# Patient Record
Sex: MALE | Race: BLACK OR AFRICAN AMERICAN | Employment: FULL TIME | URBAN - METROPOLITAN AREA
[De-identification: names, ages, dates, MRNs, and addresses within clinical notes are randomized per-mention and may not be internally consistent; named-entity substitution may affect disease eponyms.]

---

## 2018-11-12 ENCOUNTER — HOSPITAL ENCOUNTER (EMERGENCY)
Age: 38
Discharge: HOME | End: 2018-11-12
Attending: EMERGENCY MEDICINE

## 2018-11-12 VITALS
HEIGHT: 67 IN | WEIGHT: 225 LBS | SYSTOLIC BLOOD PRESSURE: 130 MMHG | DIASTOLIC BLOOD PRESSURE: 88 MMHG | RESPIRATION RATE: 18 BRPM | BODY MASS INDEX: 35.31 KG/M2 | TEMPERATURE: 97.9 F | OXYGEN SATURATION: 100 % | HEART RATE: 75 BPM

## 2018-11-12 DIAGNOSIS — S39.012A STRAIN OF LUMBAR REGION, INITIAL ENCOUNTER: Primary | ICD-10-CM

## 2018-11-12 PROCEDURE — G0382 LEV 3 HOSP TYPE B ED VISIT: HCPCS

## 2018-11-12 RX ORDER — CYCLOBENZAPRINE HCL 10 MG
10 TABLET ORAL 3 TIMES DAILY PRN
Qty: 20 TABLET | Refills: 0 | Status: SHIPPED | OUTPATIENT
Start: 2018-11-12 | End: 2018-11-19

## 2018-11-12 RX ORDER — NAPROXEN 500 MG/1
500 TABLET ORAL 2 TIMES DAILY
Qty: 60 TABLET | Refills: 0 | Status: SHIPPED | OUTPATIENT
Start: 2018-11-12

## 2018-11-12 ASSESSMENT — PAIN DESCRIPTION - ORIENTATION: ORIENTATION: LEFT;RIGHT;MID;LOWER

## 2018-11-12 ASSESSMENT — PAIN DESCRIPTION - ONSET: ONSET: ON-GOING

## 2018-11-12 ASSESSMENT — PAIN DESCRIPTION - PAIN TYPE: TYPE: ACUTE PAIN;CHRONIC PAIN

## 2018-11-12 ASSESSMENT — PAIN DESCRIPTION - PROGRESSION: CLINICAL_PROGRESSION: GRADUALLY WORSENING

## 2018-11-12 ASSESSMENT — PAIN SCALES - GENERAL: PAINLEVEL_OUTOF10: 9

## 2018-11-12 ASSESSMENT — PAIN DESCRIPTION - FREQUENCY: FREQUENCY: CONTINUOUS

## 2018-11-12 ASSESSMENT — PAIN DESCRIPTION - LOCATION: LOCATION: BACK

## 2018-11-12 NOTE — ED PROVIDER NOTES
[facility]  ED  eMERGENCY dEPARTMENT eNCOUnter      Pt Name: Flakito Chi  MRN: [id number]  [place] 1980  Date of evaluation: 11/12/18      CHIEF COMPLAINT:   Chief Complaint   Patient presents with    Back Pain     HISTORY OF PRESENT ILLNESS    Flakito Chi is a 41 y.o. male who presents with Low lumbar pain ever sense work injury one year ago. Pain is sharp in nature and worse with movement made better by recumbency and immobilization. Denies Weakness or numbness/tingling. No loss of Bowel/Bladder function. Denies CP/SOB/n/v/f/c/abd pain. Denies IVDA. REVIEW OF SYSTEMS     Constitutional: Denies recent fever, chills. Eyes: No visual changes. Neck: No neck pain. Respiratory: Denies recent shortness of breath. Cardiac:  Denies recent chest pain. GI: denies any recent abdominal pain nausea or vomiting. Denies Blood in the stool or black tarry stools. : denies dysuria. Musculoskeletal: Denies focal weakness. Neurologic: denies headache or focal weakness. Skin:  Denies any rash. Negative in 10 essential Systems except as mentioned above and in the HPI. PAST MEDICAL HISTORY   PMH:  has no past medical history on file. Surgical History:  has no past surgical history on file. Social History:    Family History: Noncontributory at this time  Psychiatric History: Noncontributory at this time    Allergies:has No Known Allergies. PHYSICAL EXAM     INITIAL VITALS: BP: 130/88  Pulse: 75  Resp: 18  Temp: 97.9 °F (36.6 °C) SpO2: 100 %    CONSTITUTIONAL: Vital signs reviewed, Alert and oriented X 3. HEAD: Atraumatic, Normocephalic. EYES: Eyes are normal to inspection, Pupils equal, round and reactive to light. NECK: Normal ROM, No jugular venous distention, No meningeal signs, Cervical spine nontender. RESPIRATORY CHEST: No respiratory distress. ABDOMEN: Abdomen is nontender, No distension. No pulsatile masses palpated.     BACK:

## 2021-01-27 ENCOUNTER — APPOINTMENT (OUTPATIENT)
Dept: GENERAL RADIOLOGY | Age: 41
End: 2021-01-27
Payer: MEDICARE

## 2021-01-27 ENCOUNTER — HOSPITAL ENCOUNTER (EMERGENCY)
Age: 41
Discharge: HOME | End: 2021-01-27
Attending: EMERGENCY MEDICINE
Payer: MEDICARE

## 2021-01-27 VITALS
HEIGHT: 67 IN | OXYGEN SATURATION: 98 % | TEMPERATURE: 98.3 F | WEIGHT: 220 LBS | SYSTOLIC BLOOD PRESSURE: 151 MMHG | DIASTOLIC BLOOD PRESSURE: 76 MMHG | HEART RATE: 78 BPM | BODY MASS INDEX: 34.53 KG/M2 | RESPIRATION RATE: 18 BRPM

## 2021-01-27 DIAGNOSIS — S46.919A STRAIN OF SHOULDER, UNSPECIFIED LATERALITY, INITIAL ENCOUNTER: ICD-10-CM
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
## 2021-01-27 DIAGNOSIS — S39.012A STRAIN OF LUMBAR REGION, INITIAL ENCOUNTER: ICD-10-CM

## 2021-01-27 PROCEDURE — 72040 X-RAY EXAM NECK SPINE 2-3 VW: CPT

## 2021-01-27 PROCEDURE — 72100 X-RAY EXAM L-S SPINE 2/3 VWS: CPT

## 2021-01-27 PROCEDURE — 99283 EMERGENCY DEPT VISIT LOW MDM: CPT

## 2021-01-27 PROCEDURE — 72072 X-RAY EXAM THORAC SPINE 3VWS: CPT

## 2021-01-27 PROCEDURE — 73030 X-RAY EXAM OF SHOULDER: CPT

## 2021-01-27 RX ORDER — CYCLOBENZAPRINE HCL 10 MG
10 TABLET ORAL 3 TIMES DAILY PRN
Qty: 21 TABLET | Refills: 0 | Status: SHIPPED | OUTPATIENT
Start: 2021-01-27 | End: 2021-02-06

## 2021-01-27 RX ORDER — IBUPROFEN 800 MG/1
800 TABLET ORAL ONCE
Status: DISCONTINUED | OUTPATIENT
Start: 2021-01-27 | End: 2021-01-27

## 2021-01-27 RX ORDER — IBUPROFEN 800 MG/1
800 TABLET ORAL EVERY 8 HOURS PRN
Qty: 30 TABLET | Refills: 0 | Status: SHIPPED | OUTPATIENT
Start: 2021-01-27

## 2021-01-27 ASSESSMENT — ENCOUNTER SYMPTOMS
EYE REDNESS: 0
VOMITING: 0
BACK PAIN: 1
SHORTNESS OF BREATH: 0
COLOR CHANGE: 0
NAUSEA: 0
DIARRHEA: 0
SORE THROAT: 0
COUGH: 0
RHINORRHEA: 0
EYE DISCHARGE: 0

## 2021-01-27 ASSESSMENT — PAIN SCALES - GENERAL: PAINLEVEL_OUTOF10: 4
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
## 2024-02-07 ENCOUNTER — HOSPITAL ENCOUNTER (EMERGENCY)
Age: 44
Discharge: HOME | End: 2024-02-07
Attending: EMERGENCY MEDICINE

## 2024-02-07 VITALS
OXYGEN SATURATION: 100 % | DIASTOLIC BLOOD PRESSURE: 71 MMHG | WEIGHT: 289 LBS | HEIGHT: 67 IN | RESPIRATION RATE: 17 BRPM | BODY MASS INDEX: 45.36 KG/M2 | TEMPERATURE: 100.6 F | HEART RATE: 103 BPM | SYSTOLIC BLOOD PRESSURE: 113 MMHG

## 2024-02-07 DIAGNOSIS — B34.9 VIRAL SYNDROME: Primary | ICD-10-CM

## 2024-02-07 DIAGNOSIS — M25.552 LEFT HIP PAIN: ICD-10-CM

## 2024-02-07 PROCEDURE — 99283 EMERGENCY DEPT VISIT LOW MDM: CPT

## 2024-02-07 PROCEDURE — 6370000000 HC RX 637 (ALT 250 FOR IP): Performed by: EMERGENCY MEDICINE

## 2024-02-07 RX ORDER — IBUPROFEN 800 MG/1
800 TABLET ORAL ONCE
Status: COMPLETED | OUTPATIENT
Start: 2024-02-07 | End: 2024-02-07

## 2024-02-07 RX ADMIN — IBUPROFEN 800 MG: 800 TABLET ORAL at 17:02

## 2024-02-07 ASSESSMENT — PAIN SCALES - GENERAL: PAINLEVEL_OUTOF10: 6

## 2024-02-07 NOTE — DISCHARGE INSTRUCTIONS
Use Tylenol and ibuprofen as needed for fevers and pain.  If you have any concerning symptoms come back to the ER.  Follow-up with your primary care doctor.

## 2024-02-08 NOTE — ED PROVIDER NOTES
EMERGENCY DEPARTMENT ENCOUNTER    Pt Name: Yuri Phillips  MRN: [id number]  Birthdate 1980  Date of evaluation: 2/7/24  CHIEF COMPLAINT       Chief Complaint   Patient presents with    Hip Pain     Px complains of L hip pain that flared up this am. Px denies injury    Illness     Px complains of symptoms of illness including migraine, cough, sore throat, and fever that started 2/6.      HISTORY OF PRESENT ILLNESS   HPI  43-year-old male with no significant past medical history presents to the ED for 2 days of headache, cough, sore throat, intermittent fevers, left hip pain.  Patient states that for the past few days he has had URI symptoms, today he woke up and had some left hip soreness, states that this is not new, thinks he slept on his hip in a weird position.  Patient denies any trauma, chest pain, shortness of breath, nausea/vomiting or any other acute complaints.    REVIEW OF SYSTEMS     Review of Systems   All other systems reviewed and are negative.    PASTMEDICAL HISTORY   No past medical history on file.  SURGICAL HISTORY     No past surgical history on file.  CURRENT MEDICATIONS       Discharge Medication List as of 2/7/2024  5:00 PM        CONTINUE these medications which have NOT CHANGED    Details   ibuprofen (ADVIL;MOTRIN) 800 MG tablet Take 1 tablet by mouth every 8 hours as needed for Pain, Disp-30 tablet, R-0Print      ibuprofen (CHILDRENS ADVIL) 100 MG/5ML suspension Take 40 mLs by mouth every 8 hours as needed for Fever, Disp-1 Bottle, R-3Print      naproxen (NAPROSYN) 500 MG tablet Take 1 tablet by mouth 2 times daily, Disp-60 tablet, R-0Print           ALLERGIES     has No Known Allergies.  FAMILY HISTORY     has no family status information on file.      SOCIAL HISTORY       Social History     Tobacco Use    Smoking status: Never   Substance Use Topics    Alcohol use: Not Currently    Drug use: Never     PHYSICAL EXAM     INITIAL VITALS: /71   Pulse (!) 103   Temp (!) 100.6 °F (38.1

## 2024-08-07 ENCOUNTER — HOSPITAL ENCOUNTER (INPATIENT)
Age: 44
LOS: 2 days | Discharge: HOME | DRG: 392 | End: 2024-08-09
Attending: EMERGENCY MEDICINE | Admitting: FAMILY MEDICINE
Payer: COMMERCIAL

## 2024-08-07 ENCOUNTER — APPOINTMENT (OUTPATIENT)
Dept: CT IMAGING | Age: 44
DRG: 392 | End: 2024-08-07
Payer: COMMERCIAL

## 2024-08-07 DIAGNOSIS — K61.1 PERIRECTAL ABSCESS: Primary | ICD-10-CM

## 2024-08-07 DIAGNOSIS — K52.9 COLITIS: ICD-10-CM

## 2024-08-07 PROBLEM — N30.00 ACUTE CYSTITIS WITHOUT HEMATURIA: Status: ACTIVE | Noted: 2024-08-07

## 2024-08-07 PROBLEM — R11.2 NAUSEA VOMITING AND DIARRHEA: Status: ACTIVE | Noted: 2024-08-07

## 2024-08-07 PROBLEM — K63.0 COLITIS WITH ABSCESS: Status: ACTIVE | Noted: 2024-08-07

## 2024-08-07 PROBLEM — R10.9 INTRACTABLE ABDOMINAL PAIN: Status: ACTIVE | Noted: 2024-08-07

## 2024-08-07 PROBLEM — R19.7 NAUSEA VOMITING AND DIARRHEA: Status: ACTIVE | Noted: 2024-08-07

## 2024-08-07 PROBLEM — K62.89 PROCTITIS: Status: ACTIVE | Noted: 2024-08-07

## 2024-08-07 LAB
ALBUMIN SERPL-MCNC: 4 G/DL (ref 3.5–5.2)
ALP SERPL-CCNC: 84 U/L (ref 40–129)
ALT SERPL-CCNC: 7 U/L (ref 5–41)
ANION GAP SERPL CALCULATED.3IONS-SCNC: 13 MMOL/L (ref 9–17)
AST SERPL-CCNC: 11 U/L
BACTERIA URNS QL MICRO: ABNORMAL
BASOPHILS # BLD: <0.03 K/UL (ref 0–0.2)
BASOPHILS NFR BLD: 0 % (ref 0–2)
BILIRUB SERPL-MCNC: 0.5 MG/DL (ref 0.3–1.2)
BILIRUB UR QL STRIP: ABNORMAL
BUN SERPL-MCNC: 10 MG/DL (ref 6–20)
BUN/CREAT SERPL: 10 (ref 9–20)
CALCIUM SERPL-MCNC: 9 MG/DL (ref 8.6–10.4)
CHLORIDE SERPL-SCNC: 99 MMOL/L (ref 98–107)
CLARITY UR: CLEAR
CO2 SERPL-SCNC: 26 MMOL/L (ref 20–31)
COLOR UR: ABNORMAL
CREAT SERPL-MCNC: 1 MG/DL (ref 0.7–1.2)
EOSINOPHIL # BLD: 0.04 K/UL (ref 0–0.44)
EOSINOPHILS RELATIVE PERCENT: 0 % (ref 1–4)
EPI CELLS #/AREA URNS HPF: ABNORMAL /HPF (ref 0–5)
ERYTHROCYTE [DISTWIDTH] IN BLOOD BY AUTOMATED COUNT: 15.4 % (ref 11.8–14.4)
GFR, ESTIMATED: >90 ML/MIN/1.73M2
GLUCOSE BLD-MCNC: 90 MG/DL (ref 75–110)
GLUCOSE SERPL-MCNC: 100 MG/DL (ref 70–99)
GLUCOSE UR STRIP-MCNC: NEGATIVE MG/DL
HCT VFR BLD AUTO: 38.9 % (ref 40.7–50.3)
HGB BLD-MCNC: 12.7 G/DL (ref 13–17)
HGB UR QL STRIP.AUTO: NEGATIVE
IMM GRANULOCYTES # BLD AUTO: 0.02 K/UL (ref 0–0.3)
IMM GRANULOCYTES NFR BLD: 0 %
KETONES UR STRIP-MCNC: ABNORMAL MG/DL
LEUKOCYTE ESTERASE UR QL STRIP: NEGATIVE
LIPASE SERPL-CCNC: 28 U/L (ref 13–60)
LYMPHOCYTES NFR BLD: 2.14 K/UL (ref 1.1–3.7)
LYMPHOCYTES RELATIVE PERCENT: 21 % (ref 24–43)
MCH RBC QN AUTO: 28.1 PG (ref 25.2–33.5)
MCHC RBC AUTO-ENTMCNC: 32.6 G/DL (ref 28.4–34.8)
MCV RBC AUTO: 86.1 FL (ref 82.6–102.9)
MONOCYTES NFR BLD: 0.78 K/UL (ref 0.1–1.2)
MONOCYTES NFR BLD: 8 % (ref 3–12)
MUCOUS THREADS URNS QL MICRO: ABNORMAL
NEUTROPHILS NFR BLD: 71 % (ref 36–65)
NEUTS SEG NFR BLD: 7 K/UL (ref 1.5–8.1)
NITRITE UR QL STRIP: POSITIVE
NRBC BLD-RTO: 0 PER 100 WBC
PH UR STRIP: 6 [PH] (ref 5–8)
PLATELET # BLD AUTO: 397 K/UL (ref 138–453)
PMV BLD AUTO: 8.9 FL (ref 8.1–13.5)
POTASSIUM SERPL-SCNC: 3.5 MMOL/L (ref 3.7–5.3)
PROT SERPL-MCNC: 7.5 G/DL (ref 6.4–8.3)
PROT UR STRIP-MCNC: ABNORMAL MG/DL
RBC # BLD AUTO: 4.52 M/UL (ref 4.21–5.77)
RBC # BLD: ABNORMAL 10*6/UL
RBC #/AREA URNS HPF: ABNORMAL /HPF (ref 0–2)
SODIUM SERPL-SCNC: 138 MMOL/L (ref 135–144)
SP GR UR STRIP: 1.06 (ref 1–1.03)
UROBILINOGEN UR STRIP-ACNC: ABNORMAL EU/DL (ref 0–1)
WBC #/AREA URNS HPF: ABNORMAL /HPF (ref 0–5)
WBC OTHER # BLD: 10 K/UL (ref 3.5–11.3)

## 2024-08-07 PROCEDURE — 96374 THER/PROPH/DIAG INJ IV PUSH: CPT

## 2024-08-07 PROCEDURE — 99285 EMERGENCY DEPT VISIT HI MDM: CPT

## 2024-08-07 PROCEDURE — 74177 CT ABD & PELVIS W/CONTRAST: CPT

## 2024-08-07 PROCEDURE — 87086 URINE CULTURE/COLONY COUNT: CPT

## 2024-08-07 PROCEDURE — 81001 URINALYSIS AUTO W/SCOPE: CPT

## 2024-08-07 PROCEDURE — 85025 COMPLETE CBC W/AUTO DIFF WBC: CPT

## 2024-08-07 PROCEDURE — 2580000003 HC RX 258: Performed by: EMERGENCY MEDICINE

## 2024-08-07 PROCEDURE — 6360000004 HC RX CONTRAST MEDICATION: Performed by: EMERGENCY MEDICINE

## 2024-08-07 PROCEDURE — 80053 COMPREHEN METABOLIC PANEL: CPT

## 2024-08-07 PROCEDURE — 93005 ELECTROCARDIOGRAM TRACING: CPT

## 2024-08-07 PROCEDURE — 1200000000 HC SEMI PRIVATE

## 2024-08-07 PROCEDURE — 6360000002 HC RX W HCPCS: Performed by: EMERGENCY MEDICINE

## 2024-08-07 PROCEDURE — 99222 1ST HOSP IP/OBS MODERATE 55: CPT

## 2024-08-07 PROCEDURE — 83690 ASSAY OF LIPASE: CPT

## 2024-08-07 PROCEDURE — 82947 ASSAY GLUCOSE BLOOD QUANT: CPT

## 2024-08-07 RX ORDER — METRONIDAZOLE 500 MG/100ML
500 INJECTION, SOLUTION INTRAVENOUS ONCE
Status: COMPLETED | OUTPATIENT
Start: 2024-08-07 | End: 2024-08-07

## 2024-08-07 RX ORDER — SODIUM CHLORIDE, SODIUM LACTATE, POTASSIUM CHLORIDE, CALCIUM CHLORIDE 600; 310; 30; 20 MG/100ML; MG/100ML; MG/100ML; MG/100ML
INJECTION, SOLUTION INTRAVENOUS CONTINUOUS
Status: DISCONTINUED | OUTPATIENT
Start: 2024-08-07 | End: 2024-08-08

## 2024-08-07 RX ORDER — LEVOFLOXACIN 5 MG/ML
250 INJECTION, SOLUTION INTRAVENOUS ONCE
Status: COMPLETED | OUTPATIENT
Start: 2024-08-07 | End: 2024-08-08

## 2024-08-07 RX ORDER — LEVOFLOXACIN 5 MG/ML
500 INJECTION, SOLUTION INTRAVENOUS ONCE
Status: COMPLETED | OUTPATIENT
Start: 2024-08-07 | End: 2024-08-08

## 2024-08-07 RX ORDER — LEVOFLOXACIN 5 MG/ML
750 INJECTION, SOLUTION INTRAVENOUS ONCE
Status: DISCONTINUED | OUTPATIENT
Start: 2024-08-07 | End: 2024-08-07

## 2024-08-07 RX ORDER — SODIUM CHLORIDE 0.9 % (FLUSH) 0.9 %
10 SYRINGE (ML) INJECTION PRN
Status: DISCONTINUED | OUTPATIENT
Start: 2024-08-07 | End: 2024-08-09 | Stop reason: HOSPADM

## 2024-08-07 RX ORDER — 0.9 % SODIUM CHLORIDE 0.9 %
80 INTRAVENOUS SOLUTION INTRAVENOUS ONCE
Status: COMPLETED | OUTPATIENT
Start: 2024-08-07 | End: 2024-08-07

## 2024-08-07 RX ADMIN — SODIUM CHLORIDE, PRESERVATIVE FREE 10 ML: 5 INJECTION INTRAVENOUS at 20:38

## 2024-08-07 RX ADMIN — LEVOFLOXACIN 500 MG: 5 INJECTION, SOLUTION INTRAVENOUS at 23:22

## 2024-08-07 RX ADMIN — SODIUM CHLORIDE 80 ML: 9 INJECTION, SOLUTION INTRAVENOUS at 20:38

## 2024-08-07 RX ADMIN — METRONIDAZOLE 500 MG: 500 INJECTION, SOLUTION INTRAVENOUS at 22:21

## 2024-08-07 RX ADMIN — SODIUM CHLORIDE, POTASSIUM CHLORIDE, SODIUM LACTATE AND CALCIUM CHLORIDE: 600; 310; 30; 20 INJECTION, SOLUTION INTRAVENOUS at 22:17

## 2024-08-07 RX ADMIN — IOPAMIDOL 75 ML: 755 INJECTION, SOLUTION INTRAVENOUS at 20:37

## 2024-08-07 ASSESSMENT — PAIN - FUNCTIONAL ASSESSMENT: PAIN_FUNCTIONAL_ASSESSMENT: 0-10

## 2024-08-07 ASSESSMENT — ENCOUNTER SYMPTOMS
SORE THROAT: 0
ABDOMINAL DISTENTION: 1
CONSTIPATION: 0
WHEEZING: 0
NAUSEA: 1
ABDOMINAL PAIN: 1
VOMITING: 1
EYE REDNESS: 0
SHORTNESS OF BREATH: 0
DIARRHEA: 1

## 2024-08-07 ASSESSMENT — PAIN SCALES - GENERAL: PAINLEVEL_OUTOF10: 2

## 2024-08-08 LAB
ANION GAP SERPL CALCULATED.3IONS-SCNC: 10 MMOL/L (ref 9–17)
BASOPHILS # BLD: <0.03 K/UL (ref 0–0.2)
BASOPHILS NFR BLD: 0 % (ref 0–2)
BUN SERPL-MCNC: 8 MG/DL (ref 6–20)
BUN/CREAT SERPL: 8 (ref 9–20)
CALCIUM SERPL-MCNC: 8.5 MG/DL (ref 8.6–10.4)
CHLORIDE SERPL-SCNC: 105 MMOL/L (ref 98–107)
CO2 SERPL-SCNC: 25 MMOL/L (ref 20–31)
CREAT SERPL-MCNC: 1 MG/DL (ref 0.7–1.2)
EKG ATRIAL RATE: 51 BPM
EKG P AXIS: 47 DEGREES
EKG P-R INTERVAL: 138 MS
EKG Q-T INTERVAL: 426 MS
EKG QRS DURATION: 86 MS
EKG QTC CALCULATION (BAZETT): 392 MS
EKG R AXIS: 9 DEGREES
EKG T AXIS: 15 DEGREES
EKG VENTRICULAR RATE: 51 BPM
EOSINOPHIL # BLD: 0.12 K/UL (ref 0–0.44)
EOSINOPHILS RELATIVE PERCENT: 2 % (ref 1–4)
ERYTHROCYTE [DISTWIDTH] IN BLOOD BY AUTOMATED COUNT: 15.1 % (ref 11.8–14.4)
GFR, ESTIMATED: >90 ML/MIN/1.73M2
GLUCOSE SERPL-MCNC: 99 MG/DL (ref 70–99)
HCT VFR BLD AUTO: 36.2 % (ref 40.7–50.3)
HGB BLD-MCNC: 12.1 G/DL (ref 13–17)
IMM GRANULOCYTES # BLD AUTO: 0.01 K/UL (ref 0–0.3)
IMM GRANULOCYTES NFR BLD: 0 %
INR PPP: 1.1
LYMPHOCYTES NFR BLD: 2.25 K/UL (ref 1.1–3.7)
LYMPHOCYTES RELATIVE PERCENT: 30 % (ref 24–43)
MCH RBC QN AUTO: 28.4 PG (ref 25.2–33.5)
MCHC RBC AUTO-ENTMCNC: 33.4 G/DL (ref 28.4–34.8)
MCV RBC AUTO: 85 FL (ref 82.6–102.9)
MICROORGANISM SPEC CULT: NO GROWTH
MONOCYTES NFR BLD: 0.75 K/UL (ref 0.1–1.2)
MONOCYTES NFR BLD: 10 % (ref 3–12)
NEUTROPHILS NFR BLD: 58 % (ref 36–65)
NEUTS SEG NFR BLD: 4.36 K/UL (ref 1.5–8.1)
NRBC BLD-RTO: 0 PER 100 WBC
PLATELET # BLD AUTO: 345 K/UL (ref 138–453)
PMV BLD AUTO: 8.4 FL (ref 8.1–13.5)
POTASSIUM SERPL-SCNC: 3.7 MMOL/L (ref 3.7–5.3)
PROTHROMBIN TIME: 14.6 SEC (ref 11.5–14.2)
RBC # BLD AUTO: 4.26 M/UL (ref 4.21–5.77)
RBC # BLD: ABNORMAL 10*6/UL
SERVICE CMNT-IMP: NORMAL
SODIUM SERPL-SCNC: 140 MMOL/L (ref 135–144)
SPECIMEN DESCRIPTION: NORMAL
WBC OTHER # BLD: 7.5 K/UL (ref 3.5–11.3)

## 2024-08-08 PROCEDURE — 87506 IADNA-DNA/RNA PROBE TQ 6-11: CPT

## 2024-08-08 PROCEDURE — 2580000003 HC RX 258

## 2024-08-08 PROCEDURE — 1200000000 HC SEMI PRIVATE

## 2024-08-08 PROCEDURE — 87449 NOS EACH ORGANISM AG IA: CPT

## 2024-08-08 PROCEDURE — 99232 SBSQ HOSP IP/OBS MODERATE 35: CPT | Performed by: FAMILY MEDICINE

## 2024-08-08 PROCEDURE — 85025 COMPLETE CBC W/AUTO DIFF WBC: CPT

## 2024-08-08 PROCEDURE — 80048 BASIC METABOLIC PNL TOTAL CA: CPT

## 2024-08-08 PROCEDURE — 6370000000 HC RX 637 (ALT 250 FOR IP): Performed by: NURSE PRACTITIONER

## 2024-08-08 PROCEDURE — 87324 CLOSTRIDIUM AG IA: CPT

## 2024-08-08 PROCEDURE — 85610 PROTHROMBIN TIME: CPT

## 2024-08-08 PROCEDURE — 36415 COLL VENOUS BLD VENIPUNCTURE: CPT

## 2024-08-08 PROCEDURE — 6360000002 HC RX W HCPCS

## 2024-08-08 PROCEDURE — 6360000002 HC RX W HCPCS: Performed by: EMERGENCY MEDICINE

## 2024-08-08 RX ORDER — LEVOFLOXACIN 5 MG/ML
750 INJECTION, SOLUTION INTRAVENOUS EVERY 24 HOURS
Status: DISCONTINUED | OUTPATIENT
Start: 2024-08-08 | End: 2024-08-09 | Stop reason: HOSPADM

## 2024-08-08 RX ORDER — METRONIDAZOLE 500 MG/100ML
500 INJECTION, SOLUTION INTRAVENOUS EVERY 8 HOURS
Status: DISCONTINUED | OUTPATIENT
Start: 2024-08-08 | End: 2024-08-09 | Stop reason: HOSPADM

## 2024-08-08 RX ORDER — POTASSIUM CHLORIDE 20 MEQ/1
40 TABLET, EXTENDED RELEASE ORAL PRN
Status: DISCONTINUED | OUTPATIENT
Start: 2024-08-08 | End: 2024-08-09 | Stop reason: HOSPADM

## 2024-08-08 RX ORDER — MORPHINE SULFATE 2 MG/ML
2 INJECTION, SOLUTION INTRAMUSCULAR; INTRAVENOUS EVERY 4 HOURS PRN
Status: DISCONTINUED | OUTPATIENT
Start: 2024-08-08 | End: 2024-08-09 | Stop reason: HOSPADM

## 2024-08-08 RX ORDER — ACETAMINOPHEN 650 MG/1
650 SUPPOSITORY RECTAL EVERY 6 HOURS PRN
Status: DISCONTINUED | OUTPATIENT
Start: 2024-08-08 | End: 2024-08-09 | Stop reason: HOSPADM

## 2024-08-08 RX ORDER — ACETAMINOPHEN 325 MG/1
650 TABLET ORAL EVERY 6 HOURS PRN
Status: DISCONTINUED | OUTPATIENT
Start: 2024-08-08 | End: 2024-08-09 | Stop reason: HOSPADM

## 2024-08-08 RX ORDER — ONDANSETRON 4 MG/1
4 TABLET, ORALLY DISINTEGRATING ORAL EVERY 8 HOURS PRN
Status: DISCONTINUED | OUTPATIENT
Start: 2024-08-08 | End: 2024-08-09 | Stop reason: HOSPADM

## 2024-08-08 RX ORDER — SODIUM CHLORIDE 0.9 % (FLUSH) 0.9 %
5-40 SYRINGE (ML) INJECTION EVERY 12 HOURS SCHEDULED
Status: DISCONTINUED | OUTPATIENT
Start: 2024-08-08 | End: 2024-08-09 | Stop reason: HOSPADM

## 2024-08-08 RX ORDER — SODIUM CHLORIDE 9 MG/ML
INJECTION, SOLUTION INTRAVENOUS PRN
Status: DISCONTINUED | OUTPATIENT
Start: 2024-08-08 | End: 2024-08-09 | Stop reason: HOSPADM

## 2024-08-08 RX ORDER — ENOXAPARIN SODIUM 100 MG/ML
40 INJECTION SUBCUTANEOUS DAILY
Status: DISCONTINUED | OUTPATIENT
Start: 2024-08-09 | End: 2024-08-09

## 2024-08-08 RX ORDER — POLYETHYLENE GLYCOL 3350 17 G/17G
17 POWDER, FOR SOLUTION ORAL DAILY PRN
Status: DISCONTINUED | OUTPATIENT
Start: 2024-08-08 | End: 2024-08-09 | Stop reason: HOSPADM

## 2024-08-08 RX ORDER — POTASSIUM CHLORIDE 7.45 MG/ML
10 INJECTION INTRAVENOUS PRN
Status: DISCONTINUED | OUTPATIENT
Start: 2024-08-08 | End: 2024-08-09 | Stop reason: HOSPADM

## 2024-08-08 RX ORDER — ENOXAPARIN SODIUM 100 MG/ML
30 INJECTION SUBCUTANEOUS 2 TIMES DAILY
Status: DISCONTINUED | OUTPATIENT
Start: 2024-08-09 | End: 2024-08-08

## 2024-08-08 RX ORDER — MAGNESIUM SULFATE 1 G/100ML
1000 INJECTION INTRAVENOUS PRN
Status: DISCONTINUED | OUTPATIENT
Start: 2024-08-08 | End: 2024-08-09 | Stop reason: HOSPADM

## 2024-08-08 RX ORDER — SODIUM CHLORIDE AND POTASSIUM CHLORIDE 300; 900 MG/100ML; MG/100ML
INJECTION, SOLUTION INTRAVENOUS CONTINUOUS
Status: DISCONTINUED | OUTPATIENT
Start: 2024-08-08 | End: 2024-08-09 | Stop reason: HOSPADM

## 2024-08-08 RX ORDER — SODIUM CHLORIDE 0.9 % (FLUSH) 0.9 %
10 SYRINGE (ML) INJECTION PRN
Status: DISCONTINUED | OUTPATIENT
Start: 2024-08-08 | End: 2024-08-09 | Stop reason: HOSPADM

## 2024-08-08 RX ORDER — ONDANSETRON 2 MG/ML
4 INJECTION INTRAMUSCULAR; INTRAVENOUS EVERY 6 HOURS PRN
Status: DISCONTINUED | OUTPATIENT
Start: 2024-08-08 | End: 2024-08-09 | Stop reason: HOSPADM

## 2024-08-08 RX ADMIN — METRONIDAZOLE 500 MG: 500 INJECTION, SOLUTION INTRAVENOUS at 21:45

## 2024-08-08 RX ADMIN — POLYETHYLENE GLYCOL-3350 AND ELECTROLYTES 4000 ML: 236; 6.74; 5.86; 2.97; 22.74 POWDER, FOR SOLUTION ORAL at 17:14

## 2024-08-08 RX ADMIN — METRONIDAZOLE 500 MG: 500 INJECTION, SOLUTION INTRAVENOUS at 05:20

## 2024-08-08 RX ADMIN — PANTOPRAZOLE SODIUM 40 MG: 40 INJECTION, POWDER, FOR SOLUTION INTRAVENOUS at 21:44

## 2024-08-08 RX ADMIN — LEVOFLOXACIN 250 MG: 5 INJECTION, SOLUTION INTRAVENOUS at 01:04

## 2024-08-08 RX ADMIN — METRONIDAZOLE 500 MG: 500 INJECTION, SOLUTION INTRAVENOUS at 14:26

## 2024-08-08 RX ADMIN — LEVOFLOXACIN 750 MG: 5 INJECTION, SOLUTION INTRAVENOUS at 23:13

## 2024-08-08 RX ADMIN — POTASSIUM CHLORIDE AND SODIUM CHLORIDE: 900; 300 INJECTION, SOLUTION INTRAVENOUS at 23:26

## 2024-08-08 RX ADMIN — POTASSIUM CHLORIDE AND SODIUM CHLORIDE: 900; 300 INJECTION, SOLUTION INTRAVENOUS at 00:24

## 2024-08-08 RX ADMIN — PANTOPRAZOLE SODIUM 40 MG: 40 INJECTION, POWDER, FOR SOLUTION INTRAVENOUS at 00:24

## 2024-08-08 RX ADMIN — POTASSIUM CHLORIDE AND SODIUM CHLORIDE: 900; 300 INJECTION, SOLUTION INTRAVENOUS at 11:50

## 2024-08-08 RX ADMIN — SODIUM CHLORIDE 25 ML: 9 INJECTION, SOLUTION INTRAVENOUS at 14:19

## 2024-08-08 ASSESSMENT — ENCOUNTER SYMPTOMS
VOMITING: 0
DIARRHEA: 0
VOICE CHANGE: 0
NAUSEA: 0
CHEST TIGHTNESS: 0
SHORTNESS OF BREATH: 0
BACK PAIN: 0
ABDOMINAL PAIN: 1
CHOKING: 0
CONSTIPATION: 0
WHEEZING: 0
SINUS PRESSURE: 0
RHINORRHEA: 0
COUGH: 0

## 2024-08-08 ASSESSMENT — PAIN SCALES - GENERAL
PAINLEVEL_OUTOF10: 0

## 2024-08-08 NOTE — PLAN OF CARE
Patient alert and oriented. VSS. NSR/SB on tele. SpO2 mid 90s on RA. Afebrile. No c/o pain. Pt receiving IVF and IV abx. NPO for possible I&D of perirectal abscess today pending GI sx eval. Need stool sample. Bed in low position, call light within reach. Will continue to monitor.       Problem: Discharge Planning  Goal: Discharge to home or other facility with appropriate resources  Outcome: Progressing     Problem: Skin/Tissue Integrity - Adult  Goal: Skin integrity remains intact  Outcome: Progressing     Problem: Gastrointestinal - Adult  Goal: Minimal or absence of nausea and vomiting  Outcome: Progressing     Problem: Infection - Adult  Goal: Absence of infection at discharge  Outcome: Progressing     Problem: Metabolic/Fluid and Electrolytes - Adult  Goal: Electrolytes maintained within normal limits  Outcome: Progressing

## 2024-08-08 NOTE — H&P
[facility]  Office: [phone]  Nikolai Shook DO, Bryan Brooks DO, Carlos Abrams DO, Bill Lin DO, Jarod Arana MD, Julissa Abarca MD, Solomon Reid MD, Purnima Ward MD,  Stefan Dang MD, Celia Kang MD, Willem Quezada MD,  Segun Noonan DO, Rehan Wheat MD, Cedric Jack MD, Fabiano Shoko DO, Anabel Meza MD,  Jose L Smith DO, Nicole Hernandez MD, Rita Walton MD, Xochitl Mccullough MD, Abigail Funk MD,  Jeremy Reyes MD, Elyse Turner MD, Jovani Kerns MD, Jj Ward MD, Jerry Pappas MD, Radha Quinones MD, Alan Barraza DO, Pavan Al DO, Anamaria Rosario MD,  Dmitriy Mckeon MD, Shirley Waterhouse, CNP,  Raissa Raman, CNP, Campos Herrera, CNP,  Maryan Little, SHAHEEN, Loretta Singh, CNP, Dea Nova, CNP, Ml Michelle CNP, Veronica Meza, CNP, Kasia Monk, CNP, Estelle Lawson, PA-C, Sunshine Rodriguez, PA-C, Sagrario Cisse, CNP, Tierra Rushing, CNP, Isabell Jett, CNP, Paulina Griffith, CNS, Funmilayo Arias, CNP, Es Barton, CNP, Tracy Schwab, CNP         [facility]   IN-PATIENT SERVICE   [facility]    HISTORY AND PHYSICAL EXAMINATION            Date:   8/7/2024  Patient name:  Yuri Phillips  Date of admission:  8/7/2024  7:28 PM  MRN:   [id number]  Account:  [id number]  YOB: 1980  PCP:    No primary care provider on file.  Room:   [place] [address]  Code Status:    No Order    Chief Complaint:     Chief Complaint   Patient presents with    Abdominal Pain     Since 0100 this AM. N/V/D       History Obtained From:     patient    History of Present Illness:     Yuri Phillips is a 44 y.o. Non- / non  male who presents with Abdominal Pain (Since 0100 this AM. N/V/D)   and is admitted to the hospital for the management of Colitis with abscess.    Patient presents to the emergency department with complaints of nausea, diarrhea, vomiting and abdominal pain. He stated that he recently made multiple diet changes in

## 2024-08-08 NOTE — ED PROVIDER NOTES
EMERGENCY DEPARTMENT ENCOUNTER    Pt Name: Yuri Phillips  MRN: [id number]  Birthdate 1980  Date of evaluation: 8/8/24  CHIEF COMPLAINT       Chief Complaint   Patient presents with    Abdominal Pain     Since 0100 this AM. N/V/D     HISTORY OF PRESENT ILLNESS   HPI  44-year-old male with no significant past medical history presents to the ED for lower abdominal pain, nausea/vomiting.  Patient states that for the past few weeks he has had intermittent cramping in his lower abdomen, tends to be associated with eating certain foods.  Patient states that he has been recently made a lot of changes to his diet to lose weight.  Patient states that over the past few weeks he occasionally notices some blood and mucus in his stool.  Patient states that last night he started having nonbloody nonbilious vomiting, his lower abdominal cramping got worse so he came to the ED.  He denies trauma, chest pain, dysuria/hematuria, shortness of breath, fever/chills or any other acute complaints.  Patient denies rectal foreign bodies.    REVIEW OF SYSTEMS     Review of Systems   All other systems reviewed and are negative.    PASTMEDICAL HISTORY   No past medical history on file.  SURGICAL HISTORY     No past surgical history on file.  CURRENT MEDICATIONS       Current Discharge Medication List        CONTINUE these medications which have NOT CHANGED    Details   ibuprofen (ADVIL;MOTRIN) 800 MG tablet Take 1 tablet by mouth every 8 hours as needed for Pain  Qty: 30 tablet, Refills: 0      ibuprofen (CHILDRENS ADVIL) 100 MG/5ML suspension Take 40 mLs by mouth every 8 hours as needed for Fever  Qty: 1 Bottle, Refills: 3      naproxen (NAPROSYN) 500 MG tablet Take 1 tablet by mouth 2 times daily  Qty: 60 tablet, Refills: 0           ALLERGIES     has No Known Allergies.  FAMILY HISTORY     has no family status information on file.      SOCIAL HISTORY       Social History     Tobacco Use    Smoking status: Never   Substance Use Topics     levo/flagyl  - discussed case w/Dr. Villar from colorectal surgery    44-year-old male presents to the ED for several weeks of intermittent lower abdominal pain, occasional bloody mucousy stools, 1 day of nausea/vomiting.  On presentation patient is well-appearing stable vital signs, on exam he has some mild lower abdominal tenderness but no other concerning findings.  Differential diagnosis includes gastroenteritis, colitis, diverticulitis, UTI.  Labs today without acute findings, UA is nitrite positive but without significant pyuria, patient without significant urinary symptoms.  CT scan of the abdomen/pelvis shows significant circumferential wall thickening and surrounding fat stranding in the sigmoid colon and proximal rectum with an adjacent 5 x 2 x 3 cm abscess.  The case was discussed with Dr. Villar from colorectal surgery, he agrees with IV antibiotics and admission, recommends making patient n.p.o., consulting IR in the morning for potential IR drainage of the abscess.  All this was explained to the patient, he is in agreement with the plan.  Patient given levo/Flagyl, admitted to the hospital in stable condition for further management of his colitis and perirectal abscess.    DIAGNOSTIC RESULTS   EKG:All EKG's are interpreted by the Emergency Department Physician who either signs or Co-signs this chart in the absence of a cardiologist.    NSR, nonspecific changes, no acute ischemic changes on ST segments, no change compared to old, normal rate and normal intervals      RADIOLOGY:All plain film, CT, MRI, and formal ultrasound images (except ED bedside ultrasound) are read by the radiologist, see reports below, unless otherwisenoted in MDM or here.  CT ABDOMEN PELVIS W IV CONTRAST Additional Contrast? None   Final Result   1. Long segmental circumferential wall thickening and surrounding fat   stranding is seen in the sigmoid colon and in the proximal rectum. There is   adjacent rim enhancing fluid collection

## 2024-08-08 NOTE — PROGRESS NOTES
Occupational Therapy  [facility]  Occupational Therapy Not Seen Note    Patient not available for Occupational Therapy due to:    [] Testing:    [] Hemodialysis    [] Cancelled by RN:    []Refusal by Patient:    [] Surgery:     [] Intubation:     [] Pain Medication:    [] Sedation:     [] Spine Precautions :    [] Medical Instability:    [x] Other:  D/c OT, pt is independent    Leatha Hurd OTR/L

## 2024-08-08 NOTE — PROGRESS NOTES
Transitions of Care Pharmacy Service   Medication Review    The patient's list of current home medications has been reviewed.     Source(s) of information: spoke to patient     Based on information provided by the above source(s), I have updated the patient's home med list as described below.       I changed or updated the following medications on the patient's home medication list:  Discontinued ibuprofen (ADVIL;MOTRIN) 800 MG tablet  ibuprofen (CHILDRENS ADVIL) 100 MG/5ML suspension  naproxen (NAPROSYN) 500 MG tablet     Added None      Adjusted   None      Other Notes None            Please feel free to call me with any questions about this encounter. Thank you.    This note will be reviewed and co-signed by the [facility] Pharmacist. The pharmacist will review inpatient orders and contact the physician about any discrepancies.    Dorothy Sierra, pharmacy technician  [facility]  Phone:  [phone]  Fax: [phone]      Electronically signed by Dorothy Sierra on 8/8/2024 at 4:34 PM       Medications Prior to Admission: none

## 2024-08-08 NOTE — PROGRESS NOTES
Physical Therapy  DATE: 2024    NAME: Yuri Phillips  MRN: [id number]   : 1980    Patient not seen this date for Physical Therapy due to:    No acute care therapy needs identified.       KYLE RODRIGUEZ, PT

## 2024-08-08 NOTE — CONSULTS
Colorectal Surgery:  Consult Note        PATIENT NAME: Yuri Phillips   YOB: 1980    ADMISSION DATE: 8/7/2024  7:28 PM     Admitting Provider: Franklin    Consulted Physician: Jian     TODAY'S DATE: 8/8/2024    Chief Complaint: Bloating, diarrhea, and nausea  Consult Regarding: Colitis/proctitis    HISTORY OF PRESENT ILLNESS:  The patient is a 44 y.o. male with no past medical who was admitted on 8/7 with chief complaint of nausea, vomiting, diarrhea, bloating, and abdominal firmness that began on Wednesday morning.  Patient reports that the abdominal firmness resolved as he was on his way to the hospital.  These issues have been ongoing for the past 2 months, although his nausea usually does not lead to vomiting.  He has not noticed any correlation with specific foods, but he typically experiences vomiting after eating.  He has also been producing mucus per rectum.  With his diarrhea yesterday, he did note a small amount of blood in his stool.  He does not know if he has history of hemorrhoids but declined exam today.    He has no past medical history and takes no medications on a daily basis.  He also denies any history of past surgeries.  Family history includes T2DM and his mother, and he states that he has been trying to avoid diabetes and weight gain.  He had some intentional weight loss over the past year.    Past Medical History:    No past medical history on file.    Past Surgical History:    No past surgical history on file.    Medications Prior to Admission:   Medications Prior to Admission: ibuprofen (ADVIL;MOTRIN) 800 MG tablet, Take 1 tablet by mouth every 8 hours as needed for Pain (Patient not taking: Reported on 8/8/2024)  ibuprofen (CHILDRENS ADVIL) 100 MG/5ML suspension, Take 40 mLs by mouth every 8 hours as needed for Fever (Patient not taking: Reported on 8/8/2024)  naproxen (NAPROSYN) 500 MG tablet, Take 1 tablet by mouth 2 times daily (Patient not taking: Reported on 
IR note    EMR and imaging reviewed.  EMR in imaging reviewed.      In my opinion, the reports enhancing fluid collection is most c/w reactive edema.  In addition, this is too small to warrant any percutaneous intervention (either aspiration or drainage placement) and no percutaneous window to safely access the fluid.     Recommend, conservative management.  If after several days of antibiotic therapy and clinical symptoms worsen or do not resolve, recommend follow-up CT exam with IV contrast to see if percutaneous intervention is warranted at that time.    Please do hesitate to contact IR if clinical status changes/worsens.    MD Jessee  IR attending    If there are any questions or concerns I can be reached through [employer] or [facility] at [phone].  
  Annabella, prep tonight, will plan for flex sig/colonoscopy as appearance of colon allows tomorrow.   Following.       Francoise Villanueva CNP  Discussed with Dr. Norwood

## 2024-08-08 NOTE — PLAN OF CARE
Patient admitted for perirectal abscess. No plans for I&D per IR  GI consulted, plan is for colonoscopy tomorrow. Bowel prep started, patient is 3 glasses in  Patient has had several clear, mucas-y bowel movements with red specks. GI panel/c.diff sent. No brown bowel movements today  Receiving IV flagyl  No complaints of abd pain  VSS, call light within reach, safety maintained        Problem: Discharge Planning  Goal: Discharge to home or other facility with appropriate resources  Outcome: Progressing     Problem: Skin/Tissue Integrity - Adult  Goal: Skin integrity remains intact  Outcome: Progressing     Problem: Gastrointestinal - Adult  Goal: Minimal or absence of nausea and vomiting  Outcome: Progressing     Problem: Infection - Adult  Goal: Absence of infection at discharge  Outcome: Progressing     Problem: Metabolic/Fluid and Electrolytes - Adult  Goal: Electrolytes maintained within normal limits  Outcome: Progressing

## 2024-08-08 NOTE — PROGRESS NOTES
[facility]  Office: [phone]  Nikolai Shook DO, Bryan Brooks DO, Carlos Abrams DO, Bill Lin, DO, Jarod Arana MD, Julissa Abarca MD, Solomon Reid MD, Purnima Ward MD,  Stefan Dang MD, Celia Kang MD, Willem Quezada MD,  Segun Noonan DO, Rehan Wheat MD, Cedric Jack MD, Fabiano Shook DO, Anabel Meza MD,  Jose L Smith DO, Nicole Hernandez MD, Rita Walton MD, Xochitl Mccullough MD, Abigail Funk MD,  Jeremy Reyes MD, Elyse Turner MD, Jovani Kerns MD, Jj Ward MD, Jerry Pappas MD, Radha Quinones MD, Alan Barraza DO, Pavan Al DO, Anamaria Rosario MD,  Dmitriy Mckeon MD, Shirley Waterhouse, CNP,  Raissa Raman CNP, Campos Herrera, CNP,  Maryan Little, [facility], Loretta Singh, CNP, Dea Nova, CNP, Veronica Meza CNP, Kasia Monk, CNP, Estelle Lawson, PA-C, Sunshine Rodriguez PA-C, Sagrario Cisse, CNP, Kee Hamilton, CNP, Isabell Jett, CNP, Ml Michelle, CNP, Paulina Griffith, CNS, Funmilayo Arias, [facility], Es Barton, CNP, Tracy Schwab, CNP       [facility]      Daily Progress Note     Admit Date: 8/7/2024  Bed/Room No.  1020/1020-01  Admitting Physician : Solomon Reid MD  Code Status :Full Code  Hospital Day:  LOS: 1 day   Chief Complaint:     Chief Complaint   Patient presents with    Abdominal Pain     Since 0100 this AM. N/V/D     Principal Problem:    Colitis with abscess  Active Problems:    Nausea vomiting and diarrhea    Intractable abdominal pain    Acute cystitis without hematuria    Proctitis  Resolved Problems:    * No resolved hospital problems. *    Subjective :        Interval History/Significant events :  08/08/24    Patient reports improvement in abdominal pain.  Pain is moderate.  Patient reported loose stools.  He denies fever, chills, vomiting.  He reports weight loss for last 6 months.  Vitals - Temp:  afebrile ,  Heart Rate - Normal Cardiac Rhythm - regular rate and rhythm Blood Pressure mostly  tightness, shortness of breath and wheezing.    Cardiovascular:  Negative for chest pain, palpitations and leg swelling.   Gastrointestinal:  Positive for abdominal pain. Negative for constipation, diarrhea, nausea and vomiting.   Genitourinary:  Negative for difficulty urinating, dysuria, frequency, penile discharge and testicular pain.   Musculoskeletal:  Negative for back pain.   Skin:  Negative for rash.   Neurological:  Negative for dizziness, weakness, light-headedness and headaches.   Hematological:  Does not bruise/bleed easily.   Psychiatric/Behavioral:  Negative for agitation, behavioral problems, confusion, self-injury, sleep disturbance and suicidal ideas.      Objective :      Current Vitals : Temp: 98.1 °F (36.7 °C),  Pulse: 59, Respirations: 18, BP: 128/88, SpO2: 96 %  Last 24 Hrs Vitals   Patient Vitals for the past 24 hrs:   BP Temp Temp src Pulse Resp SpO2 Height Weight   08/08/24 1225 128/88 98.1 °F (36.7 °C) Oral 59 18 96 % -- --   08/08/24 0830 125/77 98.2 °F (36.8 °C) Oral 52 16 94 % -- --   08/08/24 0641 -- -- -- -- -- -- -- 100.9 kg (222 lb 6.4 oz)   08/08/24 0441 (!) 111/59 98.1 °F (36.7 °C) Axillary 57 16 97 % -- --   08/08/24 0024 125/73 98.6 °F (37 °C) Oral 52 16 99 % -- --   08/07/24 1846 120/79 97.6 °F (36.4 °C) -- 56 15 97 % 1.702 m (5' 7\") 101.6 kg (224 lb)     Intake / output   08/06 1901 - 08/08 0700  In: 564.7 [I.V.:465.8]  Out: 100 [Urine:100]  Physical Exam:  Physical Exam  Vitals and nursing note reviewed.   Constitutional:       General: He is not in acute distress.     Appearance: He is not diaphoretic.   HENT:      Head: Normocephalic and atraumatic.      Nose:      Right Sinus: No maxillary sinus tenderness or frontal sinus tenderness.      Left Sinus: No maxillary sinus tenderness or frontal sinus tenderness.      Mouth/Throat:      Pharynx: No oropharyngeal exudate.   Eyes:      General: No scleral icterus.     Conjunctiva/sclera: Conjunctivae normal.      Pupils: Pupils

## 2024-08-08 NOTE — PROGRESS NOTES
Patient admitted to 1020 from ER. VS taken and tele applied. Orders released and admission database completed. Oriented pt to room and answered all questions. Will monitor.

## 2024-08-08 NOTE — CARE COORDINATION
"Subjective:       Patient ID: Elin Cohen is a 26 y.o. female.    Vitals:  height is 5' 4" (1.626 m) and weight is 82.1 kg (181 lb). Her oral temperature is 97.2 °F (36.2 °C). Her blood pressure is 109/67 and her pulse is 79. Her respiration is 18 and oxygen saturation is 99%.     Chief Complaint: Sore Throat    Sore Throat    This is a new problem. The current episode started yesterday. The problem has been gradually worsening. There has been no fever. Associated symptoms include abdominal pain, congestion, ear pain, a hoarse voice and vomiting. Pertinent negatives include no coughing, headaches or shortness of breath. She has tried nothing for the symptoms. The treatment provided no relief.     Review of Systems   Constitution: Negative for chills, fever and malaise/fatigue.   HENT: Positive for congestion, ear pain, hoarse voice and sore throat.    Eyes: Negative for discharge and redness.   Cardiovascular: Negative for chest pain, dyspnea on exertion and leg swelling.   Respiratory: Negative for cough, shortness of breath, sputum production and wheezing.    Musculoskeletal: Negative for myalgias.   Gastrointestinal: Positive for abdominal pain and vomiting. Negative for nausea.   Neurological: Negative for headaches.       Objective:      Physical Exam   Constitutional: She is oriented to person, place, and time. She appears well-developed and well-nourished. She is cooperative.  Non-toxic appearance. She appears ill. No distress.   HENT:   Head: Normocephalic and atraumatic.   Right Ear: Hearing, tympanic membrane, external ear and ear canal normal.   Left Ear: Hearing, tympanic membrane, external ear and ear canal normal.   Nose: Nose normal. No mucosal edema, rhinorrhea or nasal deformity. No epistaxis. Right sinus exhibits no maxillary sinus tenderness and no frontal sinus tenderness. Left sinus exhibits no maxillary sinus tenderness and no frontal sinus tenderness.   Mouth/Throat: Uvula is midline, " Case Management Assessment  Initial Evaluation    Date/Time of Evaluation: 8/8/2024 12:41 PM  Assessment Completed by: Emily Abdullahi RN    If patient is discharged prior to next notation, then this note serves as note for discharge by case management.    Patient Name: Yuri Phillips                   YOB: 1980  Diagnosis: Perirectal abscess [K61.1]  Colitis [K52.9]  Colitis with abscess [K52.9, K63.0]                   Date / Time: 8/7/2024  7:28 PM    Patient Admission Status: Inpatient   Readmission Risk (Low < 19, Mod (19-27), High > 27): Readmission Risk Score: 5    Current PCP: No primary care provider on file.  PCP verified by CM? No (gave list)    Chart Reviewed: Yes      History Provided by: Patient  Patient Orientation: Alert and Oriented    Patient Cognition: Alert    Hospitalization in the last 30 days (Readmission):  No    If yes, Readmission Assessment in CM Navigator will be completed.    Advance Directives:      Code Status: Full Code   Patient's Primary Decision Maker is: Legal Next of Kin      Discharge Planning:    Patient lives with: Alone Type of Home: House  Primary Care Giver: Self  Patient Support Systems include: Family Members   Current Financial resources: Other (Comment) ([employer])  Current community resources: None  Current services prior to admission: None            Current DME:              Type of Home Care services:  None    ADLS  Prior functional level: Independent in ADLs/IADLs  Current functional level: Independent in ADLs/IADLs    PT AM-PAC:   /24  OT AM-PAC:   /24    Family can provide assistance at DC: Other (comment) (limited)  Would you like Case Management to discuss the discharge plan with any other family members/significant others, and if so, who? No  Plans to Return to Present Housing: Yes  Other Identified Issues/Barriers to RETURNING to current housing: na   Potential Assistance needed at discharge: N/A            Potential DME:   oropharynx is clear and moist and mucous membranes are normal. No trismus in the jaw. Normal dentition. No uvula swelling. No posterior oropharyngeal erythema.   Eyes: Conjunctivae and lids are normal. No scleral icterus.   Sclera clear bilat   Neck: Trachea normal, full passive range of motion without pain and phonation normal. Neck supple.   Cardiovascular: Normal rate, regular rhythm, normal heart sounds, intact distal pulses and normal pulses.    Pulmonary/Chest: Effort normal and breath sounds normal. No respiratory distress.   Abdominal: Soft. Normal appearance and bowel sounds are normal. She exhibits no distension. There is no tenderness.   Musculoskeletal: Normal range of motion. She exhibits no edema or deformity.   Neurological: She is alert and oriented to person, place, and time. She exhibits normal muscle tone. Coordination normal.   Skin: Skin is warm, dry and intact. She is not diaphoretic. No pallor.   Psychiatric: She has a normal mood and affect. Her speech is normal and behavior is normal. Judgment and thought content normal. Cognition and memory are normal.   Nursing note and vitals reviewed.      Assessment:       1. Viral illness    2. Sore throat    3. Nausea        Plan:         Viral illness  -     fluticasone (FLONASE) 50 mcg/actuation nasal spray; 1 spray (50 mcg total) by Each Nare route once daily.  Dispense: 1 Bottle; Refill: 1  -     sodium chloride (OCEAN NASAL) 0.65 % nasal spray; 1 spray by Nasal route as needed for Congestion.  Dispense: 45 mL; Refill: 3  -     benzonatate (TESSALON PERLES) 100 MG capsule; Take 1 capsule (100 mg total) by mouth every 6 (six) hours as needed for Cough.  Dispense: 30 capsule; Refill: 1  -     promethazine-dextromethorphan (PROMETHAZINE-DM) 6.25-15 mg/5 mL Syrp; Take 5 mLs by mouth every 4 to 6 hours as needed.  Dispense: 180 mL; Refill: 1  -     betamethasone acetate-betamethasone sodium phosphate injection 6 mg; Inject 1 mL (6 mg total) into the  muscle one time.    Sore throat  -     POCT rapid strep A  -     betamethasone acetate-betamethasone sodium phosphate injection 6 mg; Inject 1 mL (6 mg total) into the muscle one time.    Nausea  -     ondansetron disintegrating tablet 4 mg; Take 1 tablet (4 mg total) by mouth one time.  -     ondansetron (ZOFRAN) 4 MG tablet; Take 1 tablet (4 mg total) by mouth daily as needed for Nausea.  Dispense: 30 tablet; Refill: 1      Results for orders placed or performed in visit on 06/15/18   POCT rapid strep A   Result Value Ref Range    Rapid Strep A Screen Negative Negative     Acceptable Yes          Symptomatic treatment:    Alternate Tylenol and Ibuprofen every 3 hrs  salt water gargles to soothe throat  Honey/lemon water to soothe throat  Cold-eeze helps to reduce the duration of URI symptoms  Elderberry to reduce duration of URI symptoms  Cepachol helps to numb the discomfort in throat  Nasal saline spray reduces inflammation and dryness  Warm face compresses/hot showers as often as you can to open sinuses   Vicks vapor rub at night  Flonase OTC or Nasacort OTC  Simple foods like chicken noodle soup help hydrate  Delsym helps with coughing at night  Zyrtec/Claritin during the day and Benadryl at night may help if allergy component   Zantac will help if there is reflux from the post nasal drip  Rest as much as you can  Your symptoms will likely last 5-7 days, maybe longer depending on how it affects your body.  You are contagious 5-7, so minimize contact with others to reduce the spread to others. Dehydration is preventable but is one of the main reasons why you will feel so badly. Drink pedialyte, gatorade or propel. Stay hydrated.  Antibiotics are not needed unless a complication(such as Otitis Media, Bacterial sinus infection or pneumonia). Taking antibiotics for Flu/Cold is not supported by evidence-based medicine and can expose you to unnecessary side effects of the medication, such as  anaphylaxis.   If you experience any:  Chest pain, shortness of breath, wheezing or difficulty breathing  Severe headache, face, neck or ear pain  New rash  Fever over 101.5º F (38.6 C) for more than three days  Confusion, behavior change or seizure  Severe weakness or dizziness  Go to ER        If you were prescribed a narcotic medication, do not drive or operate heavy equipment or machinery while taking these medications.  You must understand that you've received an Urgent Care treatment only and that you may be released before all your medical problems are known or treated. You, the patient, will arrange for follow up care as instructed.  Follow up with your PCP or specialty clinic as directed in the next 1-2 weeks if not improved or as needed.  You can call [phone] to schedule an appointment with the appropriate provider.  If your condition worsens we recommend that you receive another evaluation at the emergency room immediately or contact your primary medical clinics after hours call service to discuss your concerns.  Please return here or go to the Emergency Department for any concerns or worsening of condition.

## 2024-08-08 NOTE — PROGRESS NOTES
[x] Medication Reconciliation was completed and the patient's home medication list was verified. The Med List Status is \"Complete\". The following sources were used to assist with Medication Reconciliation:    [] Med Rec Pharmacist already completed  [] Patient had a list of medications which was transcribed into the EHR.  [] Patient provided bottles of their medications  [x] Home medications reviewed and confirmed with patient. Patient does not take any home medications.  [] Contacted patient's pharmacy to confirm home medications  [] Contacted patient's physician office to confirm home medications  [] Medical Records from another facility and/or Care Everywhere were reviewed  [] MAR from facility requested to be faxed over  [] Unable to complete due to patient condition  [] Unable to validate med reconciliation      [] There are one or more home medications that need clarification before Medication Reconciliation can be completed. The Med List Status has been marked as In Progress.     To assist with Home Medication Reconciliation the following actions have been taken:    [] Pharmacy medication reconciliation service requested. (Note: This can be done by sending a Perfect Serve message to The Med Rec Pharmacist or by phoning [phone].)  [] Family requested to bring medications into the hospital  [] Family requested to call hospital with medication list  [] Message left with physician office  [] Request for medical records made to   [] Other

## 2024-08-09 ENCOUNTER — ANESTHESIA EVENT (OUTPATIENT)
Dept: OPERATING ROOM | Age: 44
End: 2024-08-09
Payer: COMMERCIAL

## 2024-08-09 ENCOUNTER — ANESTHESIA (OUTPATIENT)
Dept: OPERATING ROOM | Age: 44
End: 2024-08-09
Payer: COMMERCIAL

## 2024-08-09 VITALS
HEART RATE: 50 BPM | SYSTOLIC BLOOD PRESSURE: 142 MMHG | RESPIRATION RATE: 18 BRPM | OXYGEN SATURATION: 94 % | DIASTOLIC BLOOD PRESSURE: 97 MMHG | WEIGHT: 227.1 LBS | TEMPERATURE: 97.3 F | BODY MASS INDEX: 35.64 KG/M2 | HEIGHT: 67 IN

## 2024-08-09 LAB
ANION GAP SERPL CALCULATED.3IONS-SCNC: 11 MMOL/L (ref 9–17)
BASOPHILS # BLD: <0.03 K/UL (ref 0–0.2)
BASOPHILS NFR BLD: 0 % (ref 0–2)
BUN SERPL-MCNC: 7 MG/DL (ref 6–20)
BUN/CREAT SERPL: 7 (ref 9–20)
C DIFF GDH + TOXINS A+B STL QL IA.RAPID: NEGATIVE
CALCIUM SERPL-MCNC: 8.5 MG/DL (ref 8.6–10.4)
CAMPYLOBACTER DNA SPEC NAA+PROBE: NORMAL
CHLORIDE SERPL-SCNC: 105 MMOL/L (ref 98–107)
CO2 SERPL-SCNC: 23 MMOL/L (ref 20–31)
CREAT SERPL-MCNC: 1 MG/DL (ref 0.7–1.2)
EOSINOPHIL # BLD: 0.14 K/UL (ref 0–0.44)
EOSINOPHILS RELATIVE PERCENT: 2 % (ref 1–4)
ERYTHROCYTE [DISTWIDTH] IN BLOOD BY AUTOMATED COUNT: 15.2 % (ref 11.8–14.4)
ETEC ELTA+ESTB GENES STL QL NAA+PROBE: NORMAL
GFR, ESTIMATED: >90 ML/MIN/1.73M2
GLUCOSE SERPL-MCNC: 90 MG/DL (ref 70–99)
HCT VFR BLD AUTO: 35.6 % (ref 40.7–50.3)
HGB BLD-MCNC: 12 G/DL (ref 13–17)
IMM GRANULOCYTES # BLD AUTO: 0.02 K/UL (ref 0–0.3)
IMM GRANULOCYTES NFR BLD: 0 %
LYMPHOCYTES NFR BLD: 1.92 K/UL (ref 1.1–3.7)
LYMPHOCYTES RELATIVE PERCENT: 27 % (ref 24–43)
MCH RBC QN AUTO: 28.7 PG (ref 25.2–33.5)
MCHC RBC AUTO-ENTMCNC: 33.7 G/DL (ref 28.4–34.8)
MCV RBC AUTO: 85.2 FL (ref 82.6–102.9)
MONOCYTES NFR BLD: 0.65 K/UL (ref 0.1–1.2)
MONOCYTES NFR BLD: 9 % (ref 3–12)
NEUTROPHILS NFR BLD: 62 % (ref 36–65)
NEUTS SEG NFR BLD: 4.36 K/UL (ref 1.5–8.1)
NRBC BLD-RTO: 0 PER 100 WBC
P SHIGELLOIDES DNA STL QL NAA+PROBE: NORMAL
PLATELET # BLD AUTO: 348 K/UL (ref 138–453)
PMV BLD AUTO: 8.8 FL (ref 8.1–13.5)
POTASSIUM SERPL-SCNC: 4.1 MMOL/L (ref 3.7–5.3)
RBC # BLD AUTO: 4.18 M/UL (ref 4.21–5.77)
RBC # BLD: ABNORMAL 10*6/UL
SALMONELLA DNA SPEC QL NAA+PROBE: NORMAL
SHIGA TOXIN STX GENE SPEC NAA+PROBE: NORMAL
SHIGELLA DNA SPEC QL NAA+PROBE: NORMAL
SODIUM SERPL-SCNC: 139 MMOL/L (ref 135–144)
SPECIMEN DESCRIPTION: NORMAL
SPECIMEN DESCRIPTION: NORMAL
V CHOL+PARA RFBL+TRKH+TNAA STL QL NAA+PR: NORMAL
WBC OTHER # BLD: 7.1 K/UL (ref 3.5–11.3)
Y ENTERO RECN STL QL NAA+PROBE: NORMAL

## 2024-08-09 PROCEDURE — 3700000000 HC ANESTHESIA ATTENDED CARE: Performed by: INTERNAL MEDICINE

## 2024-08-09 PROCEDURE — 6360000002 HC RX W HCPCS: Performed by: NURSE ANESTHETIST, CERTIFIED REGISTERED

## 2024-08-09 PROCEDURE — 7100000001 HC PACU RECOVERY - ADDTL 15 MIN: Performed by: INTERNAL MEDICINE

## 2024-08-09 PROCEDURE — 88341 IMHCHEM/IMCYTCHM EA ADD ANTB: CPT

## 2024-08-09 PROCEDURE — 7100000000 HC PACU RECOVERY - FIRST 15 MIN: Performed by: INTERNAL MEDICINE

## 2024-08-09 PROCEDURE — 0DBP8ZX EXCISION OF RECTUM, VIA NATURAL OR ARTIFICIAL OPENING ENDOSCOPIC, DIAGNOSTIC: ICD-10-PCS | Performed by: INTERNAL MEDICINE

## 2024-08-09 PROCEDURE — 2500000003 HC RX 250 WO HCPCS: Performed by: NURSE ANESTHETIST, CERTIFIED REGISTERED

## 2024-08-09 PROCEDURE — 88305 TISSUE EXAM BY PATHOLOGIST: CPT

## 2024-08-09 PROCEDURE — 99239 HOSP IP/OBS DSCHRG MGMT >30: CPT | Performed by: FAMILY MEDICINE

## 2024-08-09 PROCEDURE — 2580000003 HC RX 258: Performed by: NURSE ANESTHETIST, CERTIFIED REGISTERED

## 2024-08-09 PROCEDURE — 80048 BASIC METABOLIC PNL TOTAL CA: CPT

## 2024-08-09 PROCEDURE — 6360000002 HC RX W HCPCS

## 2024-08-09 PROCEDURE — 3700000001 HC ADD 15 MINUTES (ANESTHESIA): Performed by: INTERNAL MEDICINE

## 2024-08-09 PROCEDURE — 2709999900 HC NON-CHARGEABLE SUPPLY: Performed by: INTERNAL MEDICINE

## 2024-08-09 PROCEDURE — 88342 IMHCHEM/IMCYTCHM 1ST ANTB: CPT

## 2024-08-09 PROCEDURE — 36415 COLL VENOUS BLD VENIPUNCTURE: CPT

## 2024-08-09 PROCEDURE — 85025 COMPLETE CBC W/AUTO DIFF WBC: CPT

## 2024-08-09 PROCEDURE — 3609010300 HC COLONOSCOPY W/BIOPSY SINGLE/MULTIPLE: Performed by: INTERNAL MEDICINE

## 2024-08-09 RX ORDER — SODIUM CHLORIDE 0.9 % (FLUSH) 0.9 %
5-40 SYRINGE (ML) INJECTION PRN
Status: DISCONTINUED | OUTPATIENT
Start: 2024-08-09 | End: 2024-08-09 | Stop reason: HOSPADM

## 2024-08-09 RX ORDER — PROPOFOL 10 MG/ML
INJECTION, EMULSION INTRAVENOUS PRN
Status: DISCONTINUED | OUTPATIENT
Start: 2024-08-09 | End: 2024-08-09 | Stop reason: SDUPTHER

## 2024-08-09 RX ORDER — FENTANYL CITRATE 50 UG/ML
25 INJECTION, SOLUTION INTRAMUSCULAR; INTRAVENOUS EVERY 5 MIN PRN
Status: DISCONTINUED | OUTPATIENT
Start: 2024-08-09 | End: 2024-08-09 | Stop reason: HOSPADM

## 2024-08-09 RX ORDER — SODIUM CHLORIDE, SODIUM LACTATE, POTASSIUM CHLORIDE, CALCIUM CHLORIDE 600; 310; 30; 20 MG/100ML; MG/100ML; MG/100ML; MG/100ML
INJECTION, SOLUTION INTRAVENOUS CONTINUOUS PRN
Status: DISCONTINUED | OUTPATIENT
Start: 2024-08-09 | End: 2024-08-09 | Stop reason: SDUPTHER

## 2024-08-09 RX ORDER — LIDOCAINE HYDROCHLORIDE 20 MG/ML
INJECTION, SOLUTION EPIDURAL; INFILTRATION; INTRACAUDAL; PERINEURAL PRN
Status: DISCONTINUED | OUTPATIENT
Start: 2024-08-09 | End: 2024-08-09 | Stop reason: SDUPTHER

## 2024-08-09 RX ORDER — HALOPERIDOL 5 MG/ML
1 INJECTION INTRAMUSCULAR
Status: DISCONTINUED | OUTPATIENT
Start: 2024-08-09 | End: 2024-08-09 | Stop reason: HOSPADM

## 2024-08-09 RX ORDER — METOCLOPRAMIDE HYDROCHLORIDE 5 MG/ML
10 INJECTION INTRAMUSCULAR; INTRAVENOUS
Status: DISCONTINUED | OUTPATIENT
Start: 2024-08-09 | End: 2024-08-09 | Stop reason: HOSPADM

## 2024-08-09 RX ORDER — OXYCODONE HYDROCHLORIDE 5 MG/1
5 TABLET ORAL
Status: DISCONTINUED | OUTPATIENT
Start: 2024-08-09 | End: 2024-08-09 | Stop reason: HOSPADM

## 2024-08-09 RX ORDER — SODIUM CHLORIDE 0.9 % (FLUSH) 0.9 %
5-40 SYRINGE (ML) INJECTION EVERY 12 HOURS SCHEDULED
Status: DISCONTINUED | OUTPATIENT
Start: 2024-08-09 | End: 2024-08-09 | Stop reason: HOSPADM

## 2024-08-09 RX ORDER — MIDAZOLAM HYDROCHLORIDE 1 MG/ML
INJECTION INTRAMUSCULAR; INTRAVENOUS PRN
Status: DISCONTINUED | OUTPATIENT
Start: 2024-08-09 | End: 2024-08-09 | Stop reason: SDUPTHER

## 2024-08-09 RX ORDER — NALOXONE HYDROCHLORIDE 0.4 MG/ML
INJECTION, SOLUTION INTRAMUSCULAR; INTRAVENOUS; SUBCUTANEOUS PRN
Status: DISCONTINUED | OUTPATIENT
Start: 2024-08-09 | End: 2024-08-09 | Stop reason: HOSPADM

## 2024-08-09 RX ORDER — ENOXAPARIN SODIUM 100 MG/ML
30 INJECTION SUBCUTANEOUS 2 TIMES DAILY
Status: DISCONTINUED | OUTPATIENT
Start: 2024-08-09 | End: 2024-08-09 | Stop reason: HOSPADM

## 2024-08-09 RX ORDER — HYDROMORPHONE HYDROCHLORIDE 1 MG/ML
0.5 INJECTION, SOLUTION INTRAMUSCULAR; INTRAVENOUS; SUBCUTANEOUS EVERY 5 MIN PRN
Status: DISCONTINUED | OUTPATIENT
Start: 2024-08-09 | End: 2024-08-09 | Stop reason: HOSPADM

## 2024-08-09 RX ORDER — METRONIDAZOLE 500 MG/1
500 TABLET ORAL 3 TIMES DAILY
Qty: 30 TABLET | Refills: 0 | Status: SHIPPED | OUTPATIENT
Start: 2024-08-09 | End: 2024-08-19

## 2024-08-09 RX ORDER — PANTOPRAZOLE SODIUM 40 MG/1
40 TABLET, DELAYED RELEASE ORAL
Qty: 90 TABLET | Refills: 1 | Status: SHIPPED | OUTPATIENT
Start: 2024-08-09

## 2024-08-09 RX ORDER — LEVOFLOXACIN 750 MG/1
750 TABLET, FILM COATED ORAL DAILY
Qty: 10 TABLET | Refills: 0 | Status: SHIPPED | OUTPATIENT
Start: 2024-08-09 | End: 2024-08-19

## 2024-08-09 RX ORDER — SODIUM CHLORIDE 9 MG/ML
INJECTION, SOLUTION INTRAVENOUS PRN
Status: DISCONTINUED | OUTPATIENT
Start: 2024-08-09 | End: 2024-08-09 | Stop reason: HOSPADM

## 2024-08-09 RX ADMIN — MIDAZOLAM 2 MG: 1 INJECTION INTRAMUSCULAR; INTRAVENOUS at 14:16

## 2024-08-09 RX ADMIN — METRONIDAZOLE 500 MG: 500 INJECTION, SOLUTION INTRAVENOUS at 05:04

## 2024-08-09 RX ADMIN — PROPOFOL 30 MG: 10 INJECTION, EMULSION INTRAVENOUS at 14:18

## 2024-08-09 RX ADMIN — PROPOFOL 20 MG: 10 INJECTION, EMULSION INTRAVENOUS at 14:20

## 2024-08-09 RX ADMIN — PROPOFOL 50 MG: 10 INJECTION, EMULSION INTRAVENOUS at 14:16

## 2024-08-09 RX ADMIN — LIDOCAINE HYDROCHLORIDE 80 MG: 20 INJECTION, SOLUTION EPIDURAL; INFILTRATION; INTRACAUDAL; PERINEURAL at 14:16

## 2024-08-09 RX ADMIN — SODIUM CHLORIDE, POTASSIUM CHLORIDE, SODIUM LACTATE AND CALCIUM CHLORIDE: 600; 310; 30; 20 INJECTION, SOLUTION INTRAVENOUS at 14:12

## 2024-08-09 ASSESSMENT — ENCOUNTER SYMPTOMS
NAUSEA: 0
CONSTIPATION: 0
CHOKING: 0
RHINORRHEA: 0
COUGH: 0
CHEST TIGHTNESS: 0
SINUS PRESSURE: 0
BACK PAIN: 0
VOMITING: 0
SHORTNESS OF BREATH: 0
VOICE CHANGE: 0
DIARRHEA: 0
WHEEZING: 0

## 2024-08-09 ASSESSMENT — PAIN - FUNCTIONAL ASSESSMENT: PAIN_FUNCTIONAL_ASSESSMENT: FACE, LEGS, ACTIVITY, CRY, AND CONSOLABILITY (FLACC)

## 2024-08-09 ASSESSMENT — LIFESTYLE VARIABLES: SMOKING_STATUS: 0

## 2024-08-09 ASSESSMENT — PAIN SCALES - GENERAL: PAINLEVEL_OUTOF10: 0

## 2024-08-09 NOTE — DISCHARGE SUMMARY
[facility]  Office: [phone]  Nikolai Shook DO, Bryan Brooks DO, Carlos Abrams DO, Bill Lin DO, Jarod Arana MD, Julissa Abarca MD, Solomon Reid MD, Purnima Ward MD,  Stefan Dang MD, Celia Kang MD, Pavan Al DO, Willem Quezada MD,  Segun Noonan DO, Rehan Wheat MD, Cedric Jack MD, Fabiano Shook DO, Anabel Meza MD,  Jose L Smith DO, Nicole Hernandez MD, Rita Walton MD, Xochitl Mccullough MD, Abigail Funk MD,  Jeremy Reyes MD, Elyse Turner MD, Jovani Kerns MD, Jj Ward MD, Alan Barraza DO, Anamaria Rosario MD,  Dmitriy Mckeon MD, Jerry Pappas MD, Shirley Waterhouse, SANTIAGO,  Raissa Raman, CNP,, Campos Herrera, CNP,  Maryan Little, DNP, Loretta Singh, CNP, Dea Nova, CNP, Ml Michelle CNP, Veronica Meza, CNP, Kasia Monk, CNP, Isabell Jett, CNP, Paulina Griffith, CNS, Funmilayo Arias, CNP, Es Barton, CNP                  [facility]      Discharge Summary     Patient ID: Yuri Phillips  :  1980   MRN: [id number]     ACCOUNT:  [id number]   Patient Location : [facility]/1020-  Patient's PCP: No primary care provider on file.  Admit Date: 2024   Discharge Date: 2024    Length of Stay: 2  Code Status:  Prior  Admitting Physician: Solomon Reid MD  Discharge Physician: Solomon Reid MD     Active Discharge Diagnosis :     Primary Problem  Colitis with abscess      Hospital Problems  Active Hospital Problems    Diagnosis Date Noted    Colitis with abscess [K52.9, K63.0] 2024    Nausea vomiting and diarrhea [R11.2, R19.7] 2024    Intractable abdominal pain [R10.9] 2024    Acute cystitis without hematuria [N30.00] 2024    Proctitis [K62.89] 2024       Admission Condition:  fair     Discharged Condition: good    Hospital Stay:     Hospital Course:  Yuri Phillips is a 44 y.o. male who was admitted for the management of   Colitis with abscess , presented to ER with Abdominal  Abdominal Pain (Since 0100 this AM. N/V/D)  Patient presented to emergency room with abdominal pain diarrhea, vomiting.  Patient has been having symptoms for more than 2 months.  He reported he has been trying to lose weight for last 6 months and has lost about 70 pounds.  Patient also has been having intermittent discharge in  stool with occasional blood.  Patient denies any fever, arthralgia, breathing difficulty.  Patient has remote history of seizures and has not been on medication for long time.  He denies smoking, alcohol use, vaping, marijuana use.  Evaluation emergency room showed normal white count, hypokalemia 3.5, normal LFT.  CT abdomen pelvis with IV contrast showed circumferential wall thickening and sigmoid colon, proximal rectum with adjacent rim-enhancing fluid with reactive lymphadenopathy.  Patient underwent colonoscopy on 8/9/2024 and was noted to have circumferential mucosal abnormality at 20 cm from anus, likely intrinsic stenosis and scope was not able to be advanced beyond that point.  Patient had poor prep.  Findings are unlikely secondary to any Crohn's or mass.  Patient was advised to continue antibiotics and follow-up outpatient for repeat colonoscopy.  Patient was tolerating diet and room abdomen.  Improved at the time of discharge.        Significant therapeutic interventions:   Intractable Nausea and vomiting -resolved.  Treated with IV fluids and antiemetics.   Sigmoid Colitis and proctitis -s/p colonoscopy.  Complete antibiotic course.  Discharged on Levaquin, Flagyl..  UTI - Levaquin     Significant Diagnostic Studies:   Labs / Micro:/Radiology  Recent Labs     08/09/24  0504 08/08/24  0549 08/07/24 2000   WBC 7.1 7.5 10.0   HGB 12.0* 12.1* 12.7*   HCT 35.6* 36.2* 38.9*   MCV 85.2 85.0 86.1    345 397         Latest Ref Rng & Units 8/9/2024     5:04 AM 8/8/2024     5:49 AM 8/7/2024     8:00 PM   Labs Renal   BUN 6 - 20 mg/dL 7  8  10    Cr 0.7 - 1.2 mg/dL 1.0  1.0  1.0    K

## 2024-08-09 NOTE — PROGRESS NOTES
[facility]  Office: [phone]  Nikolai Shook DO, Bryan Brooks DO, Carlos Abrams DO, Bill Lin, DO, Jarod Arana MD, Julissa Abarca MD, Solomon Reid MD, Purnima Ward MD,  Stefan Dang MD, Celia Kang MD, Willem Quezada MD,  Segun Noonan DO, Rehan Wheat MD, Cedric Jack MD, Fabiano Shook DO, Anabel Meza MD,  Jose L Smith DO, Nicole Hernandez MD, Rita Walton MD, Xochitl Mccullough MD, Abigail Funk MD,  Jeremy Reyes MD, Elyse Turner MD, Jovani Kerns MD, Jj Ward MD, Jerry Pappas MD, Radha Quinones MD, Alan Barraza DO, Pavan Al DO, Anamaria Rosario MD,  Dmitriy Mckeon MD, Shirley Waterhouse, CNP,  Raissa Raman CNP, Campos Herrera, CNP,  Maryan Little, [facility], Loretta Singh, CNP, Dea Nova, CNP, Veronica Meza CNP, Kasia Monk, CNP, Estelle Lawson, PA-C, Sunshine Rodriguez PA-C, Sagrario Cisse, CNP, Kee Hamilton, CNP, Isabell Jett, CNP, Ml Michelle, CNP, Paulina Griffith, CNS, Funmilayo Arias, [facility], Es Barton, CNP, Tracy Schwab, CNP       [facility]      Daily Progress Note     Admit Date: 8/7/2024  Bed/Room No.  1020/1020-01  Admitting Physician : Solomon Reid MD  Code Status :Full Code  Hospital Day:  LOS: 2 days   Chief Complaint:     Chief Complaint   Patient presents with    Abdominal Pain     Since 0100 this AM. N/V/D     Principal Problem:    Colitis with abscess  Active Problems:    Nausea vomiting and diarrhea    Intractable abdominal pain    Acute cystitis without hematuria    Proctitis  Resolved Problems:    * No resolved hospital problems. *    Subjective :        Interval History/Significant events :  08/09/24    Patient reports improvement in left lower abdominal pain.  Patient is taking bowel prep for colonoscopy later today.  He is afebrile, denies nausea, vomiting.  Patient wants to go home later today after colonoscopy.  Vitals - Temp:  afebrile ,  Heart Rate - Normal Cardiac Rhythm - regular rate  and rhythm Blood Pressure mostly controlled  Labs / test results -  normal white count , UA positive for nitrite      Nursing notes , Consults notes reviewed. Overnight events and updates discussed with Nursing staff .   Background History:         Yuri Phillips is 44 y.o. male  Who was admitted to the hospital on 8/7/2024 for treatment of Colitis with abscess.   Patient presented to emergency room with abdominal pain diarrhea, vomiting.  Patient has been having symptoms for more than 2 months.  He reported he has been trying to lose weight for last 6 months and has lost about 70 pounds.  Patient also has been having intermittent discharge in  stool with occasional blood.  Patient denies any fever, arthralgia, breathing difficulty.  Patient has remote history of seizures and has not been on medication for long time.  He denies smoking, alcohol use, vaping, marijuana use.  Evaluation emergency room showed normal white count, hypokalemia 3.5, normal LFT.  CT abdomen pelvis with IV contrast showed circumferential wall thickening and sigmoid colon, proximal rectum with adjacent rim-enhancing fluid with reactive lymphadenopathy.  PMH:  Past Medical History:   Diagnosis Date    Seizure (HCC)     was on dilation . no seizures since 2016 - not taking any medications      Allergies: No Known Allergies   Medications :  sodium chloride flush, 5-40 mL, IntraVENous, 2 times per day  levofloxacin, 750 mg, IntraVENous, Q24H  metroNIDAZOLE, 500 mg, IntraVENous, Q8H  pantoprazole (PROTONIX) 40 mg in sodium chloride (PF) 0.9 % 10 mL injection, 40 mg, IntraVENous, Nightly  enoxaparin, 40 mg, SubCUTAneous, Daily        Review of Systems   Review of Systems   Constitutional:  Positive for unexpected weight change (weight loss). Negative for activity change, appetite change, fatigue and fever.   HENT:  Negative for congestion, nosebleeds, rhinorrhea, sinus pressure, sneezing and voice change.    Respiratory:  Negative for cough,

## 2024-08-09 NOTE — PLAN OF CARE
Patient alert and oriented. VSS. NSR/SB on tele. Spo2 mid 90s on RA. Afebrile. No c/o pain. Pt receiving IVF and IV abx. Bowel prep for colonoscopy today. IR recs conservative mgmt for perirectal abscess; no intervention. Will d/c home once medically stable. Bed in low position, call light within reach. Will continue to monitor.       Problem: Discharge Planning  Goal: Discharge to home or other facility with appropriate resources  8/9/2024 0601 by Jovanna Rubio RN  Outcome: Progressing  8/8/2024 1935 by Reyes, Brittnie, RN  Outcome: Progressing     Problem: Skin/Tissue Integrity - Adult  Goal: Skin integrity remains intact  8/9/2024 0601 by Jovanna Rubio RN  Outcome: Progressing  8/8/2024 1935 by Reyes, Brittnie, RN  Outcome: Progressing     Problem: Gastrointestinal - Adult  Goal: Minimal or absence of nausea and vomiting  8/9/2024 0601 by Jovanna Rubio RN  Outcome: Progressing  8/8/2024 1935 by Reyes, Brittnie, RN  Outcome: Progressing     Problem: Infection - Adult  Goal: Absence of infection at discharge  8/9/2024 0601 by Jovanna Rubio RN  Outcome: Progressing  8/8/2024 1935 by Reyes, Brittnie, RN  Outcome: Progressing     Problem: Metabolic/Fluid and Electrolytes - Adult  Goal: Electrolytes maintained within normal limits  8/9/2024 0601 by Jovanna Rubio RN  Outcome: Progressing  8/8/2024 1935 by Reyes, Brittnie, RN  Outcome: Progressing     Problem: Pain  Goal: Verbalizes/displays adequate comfort level or baseline comfort level  8/9/2024 0601 by Jovanna Rubio RN  Outcome: Progressing  8/8/2024 1935 by Reyes, Brittnie, RN  Outcome: Progressing

## 2024-08-09 NOTE — OP NOTE
[facility] Endoscopy  INCOMPLETE COLONOSCOPY    Patient: Yuri Phillips   Date:  2024   : 1980       Med Rec#: [id number]     Procedure: INCOMPLETE colonoscopy with biopsy  Indication: Weight loss, diarrhea    Findings   Normal rectal exam  Hemorrhoids noted on retroflexion  20 cm from the anus, circumferential mucosal abnormality was noted.  Due to intrinsic stenosis, I was unable to advance the scope beyond that point.  The appearance of the lesion/mucosal abnormality was atypical and not particularly consistent with malignancy or Crohn's disease.  Biopsies obtained.  Poor prep; the patient admits noncompliance    Recommendations  Await pathology.  Will eventually require completion colonoscopy.  Further treatment plan will depend upon biopsy results    Appropriate Photos Taken: Yes  Specimen(s) Removed: As stated above  Previous Colonoscopy: None  Withdrawal Time: Not applicable  Estimated Blood Loss: Minimal  Anesthesia:  MAC    Complications: Incomplete colonoscopy secondary to luminal stenosis and poor prep  Incomplete procedure:  Informed consent was obtained after explanation of the procedure including indications, description of the procedure,  benefits and possible risks and complications of the procedure, and alternatives.  All questions were answered.  The patient's history was reviewed and a directed physical examination was performed prior to the procedure.  The patient was monitored throughout the procedure with pulse oximetry and periodic assessment of vital signs.  With the patient initially in the left lateral decubitus position, a digital rectal examination was performed.  The video endoscope was placed in the patient's rectum and advanced without difficulty  to the sigmoid colon.  The prep was poor.   Examination of the mucosa and the anatomy was performed during both introduction and withdrawal of the endoscope.     Shin Alvarenga M.D.  2024 at 2:00 PM

## 2024-08-09 NOTE — ANESTHESIA PRE PROCEDURE
- CNP       • 0.9% NaCl with KCl 40 mEq infusion   IntraVENous Continuous Kee Hamilton APRN -  mL/hr at 08/09/24 0610 Rate Verify at 08/09/24 0610   • morphine (PF) injection 2 mg  2 mg IntraVENous Q4H PRN Kee Hamilton APRN - CNP       • levoFLOXacin (LEVAQUIN) 750 MG/150ML infusion 750 mg  750 mg IntraVENous Q24H Kee Hamilton APRN - CNP   Stopped at 08/09/24 0044   • metroNIDAZOLE (FLAGYL) 500 mg in 0.9% NaCl 100 mL IVPB premix  500 mg IntraVENous Q8H Kee Hamilton APRN - CNP   Stopped at 08/09/24 0604   • pantoprazole (PROTONIX) 40 mg in sodium chloride (PF) 0.9 % 10 mL injection  40 mg IntraVENous Nightly Kee Hamilton APRN - CNP   40 mg at 08/08/24 2144   • sodium chloride flush 0.9 % injection 10 mL  10 mL IntraVENous PRN Jamila Kennedy MD   10 mL at 08/07/24 2038       Allergies:  No Known Allergies    Problem List:    Patient Active Problem List   Diagnosis Code   • Colitis with abscess K52.9, K63.0   • Nausea vomiting and diarrhea R11.2, R19.7   • Intractable abdominal pain R10.9   • Acute cystitis without hematuria N30.00   • 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 no seizures since 2016 - not taking any medications       Past Surgical History:  No past surgical history on file.    Social History:    Social History     Tobacco Use   • Smoking status: Never   • Smokeless tobacco: Not on file   Substance Use Topics   • Alcohol use: Not Currently                                Counseling given: Not Answered      Vital Signs (Current):   Vitals:    08/09/24 0406 08/09/24 0652 08/09/24 0759 08/09/24 1123   BP: 129/86  131/87 132/87   Pulse: 54  56 51   Resp: 16  16 16   Temp: 97.9 °F (36.6 °C)  98.1 °F (36.7 °C) 98.2 °F (36.8 °C)   TempSrc: Oral  Oral Oral   SpO2: 94%  97% 98%   Weight:  103 kg (227 lb 1.6 oz)     Height:                                                  BP Readings from Last 3 Encounters:

## 2024-08-09 NOTE — ANESTHESIA POSTPROCEDURE EVALUATION
Department of Anesthesiology  Postprocedure Note    Patient: Yuri Phillips  MRN: [id number]  YOB: 1980  Date of evaluation: 8/9/2024    Procedure Summary       Date: 08/09/24 Room / Location: [address]    Anesthesia Start: 1412 Anesthesia Stop: 1437    Procedure: COLONOSCOPY BIOPSY (Rectum) Diagnosis:       Colitis      (Colitis [K52.9])    Surgeons: Fabiano Alvarenga MD Responsible Provider: Thierry Schaeffer MD    Anesthesia Type: MAC ASA Status: 2            Anesthesia Type: No value filed.    Jennifer Phase I: Jennifer Score: 9    Jennifer Phase II:      Anesthesia Post Evaluation    Patient location during evaluation: PACU  Patient participation: complete - patient participated  Level of consciousness: awake  Airway patency: patent  Nausea & Vomiting: no nausea  Cardiovascular status: blood pressure returned to baseline  Respiratory status: acceptable  Hydration status: euvolemic  Comments: Multimodal analgesia pain management as indicated by procedure  Multimodal analgesia pain management approach  Pain management: adequate    No notable events documented.

## 2024-08-09 NOTE — PROGRESS NOTES
05:04 AM    BILITOT 0.5 08/07/2024 08:00 PM    ALKPHOS 84 08/07/2024 08:00 PM    AST 11 08/07/2024 08:00 PM    ALT 7 08/07/2024 08:00 PM       Radiology Review:  CT ABDOMEN PELVIS W IV CONTRAST Additional Contrast? None    Result Date: 8/7/2024  EXAMINATION: CT OF THE ABDOMEN AND PELVIS WITH CONTRAST 8/7/2024 7:33 pm TECHNIQUE: CT of the abdomen and pelvis was performed with the administration of intravenous contrast. Multiplanar reformatted images are provided for review. Automated exposure control, iterative reconstruction, and/or weight based adjustment of the mA/kV was utilized to reduce the radiation dose to as low as reasonably achievable. COMPARISON: None. HISTORY: ORDERING SYSTEM PROVIDED HISTORY: lower abdominal pain, N/V TECHNOLOGIST PROVIDED HISTORY: lower abdominal pain, N/V Decision Support Exception - unselect if not a suspected or confirmed emergency medical condition->Emergency Medical Condition (MA) Reason for Exam: Abdominal pain, diarrhea. Isovue 370 - 75ml FINDINGS: Lower Chest: No significant abnormalities. Organs: Liver: Liver is normal. No focal lesions are seen. Spleen: Normal. Pancreas: Normal Gallbladder: Normal. Adrenal glands: Normal. No focal lesions are seen. Kidneys and ureters: Normal There is no hydronephrosis or calculi. Ureters are non-dilated. Abdominal aorta and branches: Normal in caliber. IVC and portal vein: Normal in caliber. GI/Bowel: Stomach and small bowel loops are normal in caliber.  Long segmental circumferential wall thickening and surrounding fat stranding is seen in the sigmoid colon and in the proximal rectum.  There is adjacent rim enhancing fluid collection measuring 5.8 x 2.2 x 3.2 cm.  Appendix is normal. Peritoneum/Retroperitoneum: Prominent mesenteric lymph nodes are seen in the left lower quadrant of abdomen.  Enlarged retroperitoneal lymph nodes are seen measuring up to 2.2 x 1.9 cm. PELVIS Normal reproductive organs Bones/Soft Tissues: Degenerative changes  are seen in the lumbar spine. No lytic or blastic lesions are seen. There is a fat containing umbilical hernia.     1. Long segmental circumferential wall thickening and surrounding fat stranding is seen in the sigmoid colon and in the proximal rectum. There is adjacent rim enhancing fluid collection measuring 5.8 x 2.2 x 3.2 cm. Findings are concerning for colitis/proctitis. 2. Enlarged retroperitoneal lymph nodes are seen measuring up to 2.2 x 1.9 cm. Prominent mesenteric lymph nodes are seen in the left lower quadrant of abdomen. These are likely reactive.        ASSESSMENT:  Active Hospital Problems    Diagnosis Date Noted    Colitis with abscess [K52.9, K63.0] 08/07/2024    Nausea vomiting and diarrhea [R11.2, R19.7] 08/07/2024    Intractable abdominal pain [R10.9] 08/07/2024    Acute cystitis without hematuria [N30.00] 08/07/2024    Proctitis [K62.89] 08/07/2024       44 y.o. male with cramping, bloating, and diarrhea after oral intake with CT imaging findings of colitis and proctitis with pericolonic fluid collection.      Plan:  No acute surgical intervention indicated at this time.  Stool studies and IBS workup pending  Appreciate GI recommendations.  Patient is n.p.o. in anticipation of colonoscopy today.    We will follow-up colonoscopy findings.  If no evidence of malignancy requiring surgical intervention, colorectal surgery will sign off.  Plan discussed with Dr. Villar    Electronically signed by Veronica Lock DO  on 8/9/2024 at 8:34 AM   I Dr. Villar saw and examined the patient. I have edited the above and agree with the above.     Keshia Villar  Colorectal Surgery

## 2024-08-09 NOTE — CARE COORDINATION
Discharge planning    Patient chart reviewed. Lives alone. Works. Independent. No DME> car in parking lot.     Admitted with N/V and colitis. GI plans to scope today. Colorectal surgery following. IR did not do percutaneous drainage as no safe access to the fluid.

## 2024-08-15 LAB — SURGICAL PATHOLOGY REPORT: NORMAL

## 2024-08-28 RX ORDER — SODIUM CHLORIDE, SODIUM LACTATE, POTASSIUM CHLORIDE, CALCIUM CHLORIDE 600; 310; 30; 20 MG/100ML; MG/100ML; MG/100ML; MG/100ML
INJECTION, SOLUTION INTRAVENOUS CONTINUOUS
OUTPATIENT
Start: 2024-08-28

## 2024-08-28 NOTE — DISCHARGE INSTRUCTIONS
Preoperative Instructions:    Stop drinking clear liquids at midnight the night prior to surgery.  (Follow bowel prep instructions if instructed by your surgeon.)    Arrive at the surgery center (Entrance B) by 6:00 on 9/20/2024  (or as directed by your surgeon's office).    Please stop any blood thinning medications as directed by your surgeon or prescribing physician. Failure to stop certain medications may interfere with your scheduled surgery.    These may include:  Aspirin, Warfarin (Coumadin), Clopidogrel (Plavix), Ibuprofen (Motrin, Advil), Naproxen (Aleve), Meloxicam (Mobic), Celecoxib (Celebrex), Eliquis, Pradaxa, Xarelto, Effient, Fish Oil, Herbal supplements.    You may continue the rest of your medications through the night before surgery unless instructed otherwise.    Please take only the following medication(s) the day of surgery with a small sip of water:  pantoprazole    Please use and bring inhalers the day of surgery, if applies.  Please bring CPAP the day of surgery, if applies.    PLEASE NOTE:  THE ABOVE (IF ANY) DISCONTINUED MEDS MAY ONLY BE FROM   \"CLEANING UP\" THE MED LIST AND WERE NOT ACTUALLY CANCELLED; SEE CHART FOR DETAILS AND ALWAYS CHECK WITH PRESCRIBING PROVIDER BEFORE DISCONTINUING ANY MEDICATIONS        REMINDERS:  ** If you are going home the day of your procedure, you will need a friend or family member to drive you home after your procedure.  Your  must be 18 years of age or older and able to sign off on your discharge instructions.  Taxi cabs or any form of public transportation is not acceptable.    ** It is preferable that the friend or family member stay at the hospital throughout your procedure.  ** If you are going home the same day as your procedure, someone must remain with you for the first 24 hours after your surgery if you receive anesthesia or sedation.  If you do not have someone to stay with you, your procedure may be cancelled.  **  Please do not wear any  children are not permitted in recovery room and we want you to be able to spend time with the patient.  If other arrangements are not available then we suggest that you have a second adult to stay in the waiting room.      If you have any other questions regarding your procedure or the day of surgery, please call [phone], or [phone]

## 2024-09-05 ENCOUNTER — HOSPITAL ENCOUNTER (OUTPATIENT)
Dept: PREADMISSION TESTING | Age: 44
Discharge: HOME | End: 2024-09-09
Payer: COMMERCIAL

## 2024-09-05 VITALS
TEMPERATURE: 97.9 F | HEIGHT: 66 IN | HEART RATE: 75 BPM | WEIGHT: 207.6 LBS | RESPIRATION RATE: 18 BRPM | DIASTOLIC BLOOD PRESSURE: 87 MMHG | SYSTOLIC BLOOD PRESSURE: 131 MMHG | BODY MASS INDEX: 33.37 KG/M2 | OXYGEN SATURATION: 99 %

## 2024-09-05 LAB
ANION GAP SERPL CALCULATED.3IONS-SCNC: 10 MMOL/L (ref 9–16)
BUN SERPL-MCNC: 9 MG/DL (ref 6–20)
CHLORIDE SERPL-SCNC: 102 MMOL/L (ref 98–107)
CO2 SERPL-SCNC: 27 MMOL/L (ref 20–31)
CREAT SERPL-MCNC: 1 MG/DL (ref 0.7–1.2)
ERYTHROCYTE [DISTWIDTH] IN BLOOD BY AUTOMATED COUNT: 14.9 % (ref 11.8–14.4)
GFR, ESTIMATED: >90 ML/MIN/1.73M2
GLUCOSE SERPL-MCNC: 86 MG/DL (ref 74–99)
HCT VFR BLD AUTO: 38.9 % (ref 40.7–50.3)
HGB BLD-MCNC: 12.9 G/DL (ref 13–17)
MCH RBC QN AUTO: 28.4 PG (ref 25.2–33.5)
MCHC RBC AUTO-ENTMCNC: 33.2 G/DL (ref 28.4–34.8)
MCV RBC AUTO: 85.5 FL (ref 82.6–102.9)
NRBC BLD-RTO: 0 PER 100 WBC
PLATELET # BLD AUTO: 380 K/UL (ref 138–453)
PMV BLD AUTO: 8.8 FL (ref 8.1–13.5)
POTASSIUM SERPL-SCNC: 3.7 MMOL/L (ref 3.7–5.3)
RBC # BLD AUTO: 4.55 M/UL (ref 4.21–5.77)
SODIUM SERPL-SCNC: 139 MMOL/L (ref 136–145)
WBC OTHER # BLD: 6.4 K/UL (ref 3.5–11.3)

## 2024-09-05 PROCEDURE — 93005 ELECTROCARDIOGRAM TRACING: CPT | Performed by: ANESTHESIOLOGY

## 2024-09-05 PROCEDURE — 85027 COMPLETE CBC AUTOMATED: CPT

## 2024-09-05 PROCEDURE — 84520 ASSAY OF UREA NITROGEN: CPT

## 2024-09-05 PROCEDURE — 36415 COLL VENOUS BLD VENIPUNCTURE: CPT

## 2024-09-05 PROCEDURE — 80051 ELECTROLYTE PANEL: CPT

## 2024-09-05 PROCEDURE — 82947 ASSAY GLUCOSE BLOOD QUANT: CPT

## 2024-09-05 PROCEDURE — 82565 ASSAY OF CREATININE: CPT

## 2024-09-05 NOTE — PROGRESS NOTES
Anesthesia Focused Assessment      STOP-BANG Sleep Apnea Questionnaire    SNORE loudly (heard through closed doors)?   No  TIRED, fatigued, sleepy during daytime?    No  OBSERVED stopping breathing during sleep?   No  High blood PRESSURE being treated?    No    BMI over 35?        No  AGE over 50?        No  NECK circumference over 16\"?     No  GENDER (male)?       Yes             Total 1  High risk 5-8  Intermediate risk 3-4  Low risk 0-2    Obstructive Sleep Apnea: denies  If YES, machine used: no     Type 1 DM:   no  T2DM:  no    Coronary Artery Disease:  no  Hypertension:  no    Active smoker:  no  Drinks alcohol:  no  Recreational drugs: no    Dentition: benign    Defib / AICD / Pacemaker: no      Renal Failure/dialysis:  no    Patient was evaluated in PAT & anesthesia guidelines were applied.   NPO guidelines, medication instructions and scheduled arrival time were reviewed with patient.  I advised patient to please contact the surgeon's office, ahead of time if possible, if any new signs or symptoms of illness, infection, rash, etc    Hx of anesthesia complications:  unknown for general anesthesia, no difficulty with sedation for colonoscopy.  Family hx of anesthesia complications:  no                                                                                                                     Patient is active, without cardiac or pulmonary complaints.    IVAN BARRON PA-C  9/5/24  11:55 AM

## 2024-09-06 LAB
EKG ATRIAL RATE: 59 BPM
EKG P AXIS: 60 DEGREES
EKG P-R INTERVAL: 120 MS
EKG Q-T INTERVAL: 428 MS
EKG QRS DURATION: 82 MS
EKG QTC CALCULATION (BAZETT): 423 MS
EKG R AXIS: 29 DEGREES
EKG T AXIS: 26 DEGREES
EKG VENTRICULAR RATE: 59 BPM

## 2024-09-19 ENCOUNTER — ANESTHESIA EVENT (OUTPATIENT)
Dept: OPERATING ROOM | Age: 44
End: 2024-09-19
Payer: COMMERCIAL

## 2024-09-20 ENCOUNTER — ANESTHESIA (OUTPATIENT)
Dept: OPERATING ROOM | Age: 44
End: 2024-09-20
Payer: COMMERCIAL

## 2024-09-20 ENCOUNTER — HOSPITAL ENCOUNTER (INPATIENT)
Age: 44
LOS: 5 days | Discharge: HOME | DRG: 330 | End: 2024-09-25
Attending: COLON & RECTAL SURGERY | Admitting: COLON & RECTAL SURGERY
Payer: COMMERCIAL

## 2024-09-20 DIAGNOSIS — C18.7 CANCER OF SIGMOID (HCC): ICD-10-CM

## 2024-09-20 PROBLEM — Z90.49 S/P LEFT COLECTOMY: Status: ACTIVE | Noted: 2024-09-20

## 2024-09-20 LAB — CEA SERPL-MCNC: 58.4 NG/ML (ref 0–3.8)

## 2024-09-20 PROCEDURE — 82378 CARCINOEMBRYONIC ANTIGEN: CPT

## 2024-09-20 PROCEDURE — 3600000004 HC SURGERY LEVEL 4 BASE: Performed by: COLON & RECTAL SURGERY

## 2024-09-20 PROCEDURE — 2580000003 HC RX 258: Performed by: ANESTHESIOLOGY

## 2024-09-20 PROCEDURE — 88342 IMHCHEM/IMCYTCHM 1ST ANTB: CPT

## 2024-09-20 PROCEDURE — 2500000003 HC RX 250 WO HCPCS

## 2024-09-20 PROCEDURE — 2709999900 HC NON-CHARGEABLE SUPPLY: Performed by: COLON & RECTAL SURGERY

## 2024-09-20 PROCEDURE — 0DJD8ZZ INSPECTION OF LOWER INTESTINAL TRACT, VIA NATURAL OR ARTIFICIAL OPENING ENDOSCOPIC: ICD-10-PCS | Performed by: COLON & RECTAL SURGERY

## 2024-09-20 PROCEDURE — 1200000000 HC SEMI PRIVATE

## 2024-09-20 PROCEDURE — 3700000000 HC ANESTHESIA ATTENDED CARE: Performed by: COLON & RECTAL SURGERY

## 2024-09-20 PROCEDURE — 2580000003 HC RX 258

## 2024-09-20 PROCEDURE — 99223 1ST HOSP IP/OBS HIGH 75: CPT | Performed by: INTERNAL MEDICINE

## 2024-09-20 PROCEDURE — 6370000000 HC RX 637 (ALT 250 FOR IP): Performed by: STUDENT IN AN ORGANIZED HEALTH CARE EDUCATION/TRAINING PROGRAM

## 2024-09-20 PROCEDURE — 7100000001 HC PACU RECOVERY - ADDTL 15 MIN: Performed by: COLON & RECTAL SURGERY

## 2024-09-20 PROCEDURE — 6360000002 HC RX W HCPCS

## 2024-09-20 PROCEDURE — 88305 TISSUE EXAM BY PATHOLOGIST: CPT

## 2024-09-20 PROCEDURE — 87086 URINE CULTURE/COLONY COUNT: CPT

## 2024-09-20 PROCEDURE — 6360000002 HC RX W HCPCS: Performed by: STUDENT IN AN ORGANIZED HEALTH CARE EDUCATION/TRAINING PROGRAM

## 2024-09-20 PROCEDURE — 2580000003 HC RX 258: Performed by: STUDENT IN AN ORGANIZED HEALTH CARE EDUCATION/TRAINING PROGRAM

## 2024-09-20 PROCEDURE — 2580000003 HC RX 258: Performed by: COLON & RECTAL SURGERY

## 2024-09-20 PROCEDURE — 3700000001 HC ADD 15 MINUTES (ANESTHESIA): Performed by: COLON & RECTAL SURGERY

## 2024-09-20 PROCEDURE — 2720000010 HC SURG SUPPLY STERILE: Performed by: COLON & RECTAL SURGERY

## 2024-09-20 PROCEDURE — 88341 IMHCHEM/IMCYTCHM EA ADD ANTB: CPT

## 2024-09-20 PROCEDURE — 0D1N0Z4 BYPASS SIGMOID COLON TO CUTANEOUS, OPEN APPROACH: ICD-10-PCS | Performed by: COLON & RECTAL SURGERY

## 2024-09-20 PROCEDURE — 0WBF0ZX EXCISION OF ABDOMINAL WALL, OPEN APPROACH, DIAGNOSTIC: ICD-10-PCS | Performed by: COLON & RECTAL SURGERY

## 2024-09-20 PROCEDURE — 3600000014 HC SURGERY LEVEL 4 ADDTL 15MIN: Performed by: COLON & RECTAL SURGERY

## 2024-09-20 PROCEDURE — 0DBW0ZX EXCISION OF PERITONEUM, OPEN APPROACH, DIAGNOSTIC: ICD-10-PCS | Performed by: COLON & RECTAL SURGERY

## 2024-09-20 PROCEDURE — 0WJJ0ZZ INSPECTION OF PELVIC CAVITY, OPEN APPROACH: ICD-10-PCS | Performed by: COLON & RECTAL SURGERY

## 2024-09-20 PROCEDURE — 7100000000 HC PACU RECOVERY - FIRST 15 MIN: Performed by: COLON & RECTAL SURGERY

## 2024-09-20 PROCEDURE — 36415 COLL VENOUS BLD VENIPUNCTURE: CPT

## 2024-09-20 PROCEDURE — 0DBN0ZX EXCISION OF SIGMOID COLON, OPEN APPROACH, DIAGNOSTIC: ICD-10-PCS | Performed by: COLON & RECTAL SURGERY

## 2024-09-20 RX ORDER — ENOXAPARIN SODIUM 100 MG/ML
40 INJECTION SUBCUTANEOUS 2 TIMES DAILY
Status: DISCONTINUED | OUTPATIENT
Start: 2024-09-21 | End: 2024-09-25 | Stop reason: HOSPADM

## 2024-09-20 RX ORDER — SODIUM CHLORIDE 0.9 % (FLUSH) 0.9 %
5-40 SYRINGE (ML) INJECTION EVERY 12 HOURS SCHEDULED
Status: DISCONTINUED | OUTPATIENT
Start: 2024-09-20 | End: 2024-09-25 | Stop reason: HOSPADM

## 2024-09-20 RX ORDER — SODIUM CHLORIDE, SODIUM LACTATE, POTASSIUM CHLORIDE, CALCIUM CHLORIDE 600; 310; 30; 20 MG/100ML; MG/100ML; MG/100ML; MG/100ML
INJECTION, SOLUTION INTRAVENOUS CONTINUOUS
Status: DISCONTINUED | OUTPATIENT
Start: 2024-09-20 | End: 2024-09-20 | Stop reason: HOSPADM

## 2024-09-20 RX ORDER — CIPROFLOXACIN 2 MG/ML
400 INJECTION, SOLUTION INTRAVENOUS EVERY 12 HOURS
Status: CANCELLED | OUTPATIENT
Start: 2024-09-20 | End: 2024-09-21

## 2024-09-20 RX ORDER — PHENYLEPHRINE HCL IN 0.9% NACL 1 MG/10 ML
SYRINGE (ML) INTRAVENOUS
Status: DISCONTINUED | OUTPATIENT
Start: 2024-09-20 | End: 2024-09-20 | Stop reason: SDUPTHER

## 2024-09-20 RX ORDER — FENTANYL CITRATE 50 UG/ML
25 INJECTION, SOLUTION INTRAMUSCULAR; INTRAVENOUS
Status: DISCONTINUED | OUTPATIENT
Start: 2024-09-20 | End: 2024-09-25 | Stop reason: HOSPADM

## 2024-09-20 RX ORDER — SODIUM CHLORIDE 0.9 % (FLUSH) 0.9 %
5-40 SYRINGE (ML) INJECTION PRN
Status: DISCONTINUED | OUTPATIENT
Start: 2024-09-20 | End: 2024-09-20 | Stop reason: HOSPADM

## 2024-09-20 RX ORDER — ACETAMINOPHEN 160 MG/5ML
1000 LIQUID ORAL EVERY 8 HOURS SCHEDULED
Status: DISCONTINUED | OUTPATIENT
Start: 2024-09-20 | End: 2024-09-25 | Stop reason: HOSPADM

## 2024-09-20 RX ORDER — ONDANSETRON 2 MG/ML
INJECTION INTRAMUSCULAR; INTRAVENOUS
Status: DISCONTINUED | OUTPATIENT
Start: 2024-09-20 | End: 2024-09-20 | Stop reason: SDUPTHER

## 2024-09-20 RX ORDER — DIPHENHYDRAMINE HYDROCHLORIDE 50 MG/ML
12.5 INJECTION INTRAMUSCULAR; INTRAVENOUS
Status: DISCONTINUED | OUTPATIENT
Start: 2024-09-20 | End: 2024-09-20 | Stop reason: HOSPADM

## 2024-09-20 RX ORDER — PANTOPRAZOLE SODIUM 40 MG/1
40 TABLET, DELAYED RELEASE ORAL
Status: DISCONTINUED | OUTPATIENT
Start: 2024-09-21 | End: 2024-09-25 | Stop reason: HOSPADM

## 2024-09-20 RX ORDER — MIDAZOLAM HYDROCHLORIDE 1 MG/ML
INJECTION INTRAMUSCULAR; INTRAVENOUS
Status: DISCONTINUED | OUTPATIENT
Start: 2024-09-20 | End: 2024-09-20 | Stop reason: SDUPTHER

## 2024-09-20 RX ORDER — HYDRALAZINE HYDROCHLORIDE 20 MG/ML
10 INJECTION INTRAMUSCULAR; INTRAVENOUS
Status: DISCONTINUED | OUTPATIENT
Start: 2024-09-20 | End: 2024-09-20 | Stop reason: HOSPADM

## 2024-09-20 RX ORDER — ONDANSETRON 2 MG/ML
4 INJECTION INTRAMUSCULAR; INTRAVENOUS EVERY 6 HOURS PRN
Status: DISCONTINUED | OUTPATIENT
Start: 2024-09-20 | End: 2024-09-25 | Stop reason: HOSPADM

## 2024-09-20 RX ORDER — SODIUM CHLORIDE 9 MG/ML
INJECTION, SOLUTION INTRAVENOUS PRN
Status: DISCONTINUED | OUTPATIENT
Start: 2024-09-20 | End: 2024-09-25 | Stop reason: HOSPADM

## 2024-09-20 RX ORDER — LORAZEPAM 2 MG/ML
0.5 INJECTION INTRAMUSCULAR
Status: DISCONTINUED | OUTPATIENT
Start: 2024-09-20 | End: 2024-09-20 | Stop reason: HOSPADM

## 2024-09-20 RX ORDER — OXYCODONE HYDROCHLORIDE 5 MG/1
10 TABLET ORAL PRN
Status: DISCONTINUED | OUTPATIENT
Start: 2024-09-20 | End: 2024-09-20 | Stop reason: HOSPADM

## 2024-09-20 RX ORDER — PROCHLORPERAZINE EDISYLATE 5 MG/ML
5 INJECTION INTRAMUSCULAR; INTRAVENOUS
Status: DISCONTINUED | OUTPATIENT
Start: 2024-09-20 | End: 2024-09-20 | Stop reason: HOSPADM

## 2024-09-20 RX ORDER — SODIUM CHLORIDE 9 MG/ML
INJECTION, SOLUTION INTRAVENOUS PRN
Status: DISCONTINUED | OUTPATIENT
Start: 2024-09-20 | End: 2024-09-20 | Stop reason: HOSPADM

## 2024-09-20 RX ORDER — ONDANSETRON 4 MG/1
4 TABLET, ORALLY DISINTEGRATING ORAL EVERY 8 HOURS PRN
Status: DISCONTINUED | OUTPATIENT
Start: 2024-09-20 | End: 2024-09-25 | Stop reason: HOSPADM

## 2024-09-20 RX ORDER — OXYCODONE HYDROCHLORIDE 5 MG/1
5 TABLET ORAL PRN
Status: DISCONTINUED | OUTPATIENT
Start: 2024-09-20 | End: 2024-09-20 | Stop reason: HOSPADM

## 2024-09-20 RX ORDER — SODIUM CHLORIDE 0.9 % (FLUSH) 0.9 %
5-40 SYRINGE (ML) INJECTION EVERY 12 HOURS SCHEDULED
Status: DISCONTINUED | OUTPATIENT
Start: 2024-09-20 | End: 2024-09-20 | Stop reason: HOSPADM

## 2024-09-20 RX ORDER — FENTANYL CITRATE 50 UG/ML
INJECTION, SOLUTION INTRAMUSCULAR; INTRAVENOUS
Status: DISCONTINUED | OUTPATIENT
Start: 2024-09-20 | End: 2024-09-20 | Stop reason: SDUPTHER

## 2024-09-20 RX ORDER — METRONIDAZOLE 500 MG/100ML
500 INJECTION, SOLUTION INTRAVENOUS EVERY 8 HOURS
Status: COMPLETED | OUTPATIENT
Start: 2024-09-20 | End: 2024-09-21

## 2024-09-20 RX ORDER — OXYCODONE HCL 5 MG/5 ML
10 SOLUTION, ORAL ORAL EVERY 4 HOURS PRN
Status: DISCONTINUED | OUTPATIENT
Start: 2024-09-20 | End: 2024-09-24

## 2024-09-20 RX ORDER — METRONIDAZOLE 500 MG/100ML
INJECTION, SOLUTION INTRAVENOUS
Status: DISCONTINUED | OUTPATIENT
Start: 2024-09-20 | End: 2024-09-20 | Stop reason: SDUPTHER

## 2024-09-20 RX ORDER — FENTANYL CITRATE 50 UG/ML
25 INJECTION, SOLUTION INTRAMUSCULAR; INTRAVENOUS EVERY 5 MIN PRN
Status: DISCONTINUED | OUTPATIENT
Start: 2024-09-20 | End: 2024-09-20 | Stop reason: HOSPADM

## 2024-09-20 RX ORDER — SODIUM CHLORIDE, SODIUM LACTATE, POTASSIUM CHLORIDE, CALCIUM CHLORIDE 600; 310; 30; 20 MG/100ML; MG/100ML; MG/100ML; MG/100ML
INJECTION, SOLUTION INTRAVENOUS
Status: DISCONTINUED | OUTPATIENT
Start: 2024-09-20 | End: 2024-09-20 | Stop reason: SDUPTHER

## 2024-09-20 RX ORDER — SODIUM CHLORIDE 0.9 % (FLUSH) 0.9 %
5-40 SYRINGE (ML) INJECTION PRN
Status: DISCONTINUED | OUTPATIENT
Start: 2024-09-20 | End: 2024-09-25 | Stop reason: HOSPADM

## 2024-09-20 RX ORDER — KETOROLAC TROMETHAMINE 15 MG/ML
15 INJECTION, SOLUTION INTRAMUSCULAR; INTRAVENOUS EVERY 6 HOURS SCHEDULED
Status: DISPENSED | OUTPATIENT
Start: 2024-09-20 | End: 2024-09-25

## 2024-09-20 RX ORDER — LIDOCAINE HYDROCHLORIDE 10 MG/ML
INJECTION, SOLUTION EPIDURAL; INFILTRATION; INTRACAUDAL; PERINEURAL
Status: DISCONTINUED | OUTPATIENT
Start: 2024-09-20 | End: 2024-09-20 | Stop reason: SDUPTHER

## 2024-09-20 RX ORDER — MAGNESIUM HYDROXIDE 1200 MG/15ML
LIQUID ORAL CONTINUOUS PRN
Status: DISCONTINUED | OUTPATIENT
Start: 2024-09-20 | End: 2024-09-20 | Stop reason: HOSPADM

## 2024-09-20 RX ORDER — DEXAMETHASONE SODIUM PHOSPHATE 10 MG/ML
INJECTION, SOLUTION INTRAMUSCULAR; INTRAVENOUS
Status: DISCONTINUED | OUTPATIENT
Start: 2024-09-20 | End: 2024-09-20 | Stop reason: SDUPTHER

## 2024-09-20 RX ORDER — PROPOFOL 10 MG/ML
INJECTION, EMULSION INTRAVENOUS
Status: DISCONTINUED | OUTPATIENT
Start: 2024-09-20 | End: 2024-09-20 | Stop reason: SDUPTHER

## 2024-09-20 RX ORDER — CEFAZOLIN SODIUM 1 G/3ML
INJECTION, POWDER, FOR SOLUTION INTRAMUSCULAR; INTRAVENOUS
Status: DISCONTINUED | OUTPATIENT
Start: 2024-09-20 | End: 2024-09-20 | Stop reason: SDUPTHER

## 2024-09-20 RX ORDER — NALOXONE HYDROCHLORIDE 0.4 MG/ML
INJECTION, SOLUTION INTRAMUSCULAR; INTRAVENOUS; SUBCUTANEOUS PRN
Status: DISCONTINUED | OUTPATIENT
Start: 2024-09-20 | End: 2024-09-20 | Stop reason: HOSPADM

## 2024-09-20 RX ORDER — LABETALOL HYDROCHLORIDE 5 MG/ML
10 INJECTION, SOLUTION INTRAVENOUS
Status: DISCONTINUED | OUTPATIENT
Start: 2024-09-20 | End: 2024-09-20 | Stop reason: HOSPADM

## 2024-09-20 RX ORDER — SODIUM CHLORIDE, SODIUM LACTATE, POTASSIUM CHLORIDE, CALCIUM CHLORIDE 600; 310; 30; 20 MG/100ML; MG/100ML; MG/100ML; MG/100ML
INJECTION, SOLUTION INTRAVENOUS CONTINUOUS
Status: DISCONTINUED | OUTPATIENT
Start: 2024-09-20 | End: 2024-09-25 | Stop reason: HOSPADM

## 2024-09-20 RX ORDER — OXYCODONE HCL 5 MG/5 ML
5 SOLUTION, ORAL ORAL EVERY 4 HOURS PRN
Status: DISCONTINUED | OUTPATIENT
Start: 2024-09-20 | End: 2024-09-24

## 2024-09-20 RX ORDER — ROCURONIUM BROMIDE 10 MG/ML
INJECTION, SOLUTION INTRAVENOUS
Status: DISCONTINUED | OUTPATIENT
Start: 2024-09-20 | End: 2024-09-20 | Stop reason: SDUPTHER

## 2024-09-20 RX ORDER — DROPERIDOL 2.5 MG/ML
0.62 INJECTION, SOLUTION INTRAMUSCULAR; INTRAVENOUS
Status: DISCONTINUED | OUTPATIENT
Start: 2024-09-20 | End: 2024-09-20 | Stop reason: HOSPADM

## 2024-09-20 RX ADMIN — METRONIDAZOLE 500 MG: 500 INJECTION, SOLUTION INTRAVENOUS at 16:17

## 2024-09-20 RX ADMIN — ACETAMINOPHEN 1000 MG: 650 SOLUTION ORAL at 12:55

## 2024-09-20 RX ADMIN — KETOROLAC TROMETHAMINE 15 MG: 15 INJECTION, SOLUTION INTRAMUSCULAR; INTRAVENOUS at 17:35

## 2024-09-20 RX ADMIN — Medication 30 MG: at 08:32

## 2024-09-20 RX ADMIN — DEXAMETHASONE SODIUM PHOSPHATE 8 MG: 10 INJECTION INTRAMUSCULAR; INTRAVENOUS at 07:48

## 2024-09-20 RX ADMIN — ONDANSETRON 4 MG: 2 INJECTION INTRAMUSCULAR; INTRAVENOUS at 10:22

## 2024-09-20 RX ADMIN — SUGAMMADEX 200 MG: 100 INJECTION, SOLUTION INTRAVENOUS at 10:30

## 2024-09-20 RX ADMIN — LIDOCAINE HYDROCHLORIDE 50 MG: 10 INJECTION, SOLUTION EPIDURAL; INFILTRATION; INTRACAUDAL; PERINEURAL at 07:41

## 2024-09-20 RX ADMIN — FENTANYL CITRATE 100 MCG: 50 INJECTION, SOLUTION INTRAMUSCULAR; INTRAVENOUS at 07:41

## 2024-09-20 RX ADMIN — ROCURONIUM BROMIDE 50 MG: 10 INJECTION, SOLUTION INTRAVENOUS at 07:41

## 2024-09-20 RX ADMIN — FENTANYL CITRATE 100 MCG: 50 INJECTION, SOLUTION INTRAMUSCULAR; INTRAVENOUS at 08:36

## 2024-09-20 RX ADMIN — SODIUM CHLORIDE, PRESERVATIVE FREE 10 ML: 5 INJECTION INTRAVENOUS at 20:04

## 2024-09-20 RX ADMIN — Medication 100 MCG: at 08:51

## 2024-09-20 RX ADMIN — SODIUM CHLORIDE, POTASSIUM CHLORIDE, SODIUM LACTATE AND CALCIUM CHLORIDE: 600; 310; 30; 20 INJECTION, SOLUTION INTRAVENOUS at 07:20

## 2024-09-20 RX ADMIN — METRONIDAZOLE 500 MG: 500 INJECTION, SOLUTION INTRAVENOUS at 08:15

## 2024-09-20 RX ADMIN — SODIUM CHLORIDE, POTASSIUM CHLORIDE, SODIUM LACTATE AND CALCIUM CHLORIDE: 600; 310; 30; 20 INJECTION, SOLUTION INTRAVENOUS at 07:37

## 2024-09-20 RX ADMIN — PROPOFOL 160 MG: 10 INJECTION, EMULSION INTRAVENOUS at 07:41

## 2024-09-20 RX ADMIN — SODIUM CHLORIDE, POTASSIUM CHLORIDE, SODIUM LACTATE AND CALCIUM CHLORIDE: 600; 310; 30; 20 INJECTION, SOLUTION INTRAVENOUS at 13:01

## 2024-09-20 RX ADMIN — Medication 10 MG: at 09:40

## 2024-09-20 RX ADMIN — CEFAZOLIN 2 G: 1 INJECTION, POWDER, FOR SOLUTION INTRAMUSCULAR; INTRAVENOUS at 08:17

## 2024-09-20 RX ADMIN — ROCURONIUM BROMIDE 20 MG: 10 INJECTION, SOLUTION INTRAVENOUS at 08:25

## 2024-09-20 RX ADMIN — MIDAZOLAM 2 MG: 1 INJECTION INTRAMUSCULAR; INTRAVENOUS at 07:37

## 2024-09-20 RX ADMIN — HYDROMORPHONE HYDROCHLORIDE 0.5 MG: 1 INJECTION, SOLUTION INTRAMUSCULAR; INTRAVENOUS; SUBCUTANEOUS at 10:42

## 2024-09-20 RX ADMIN — Medication 2000 MG: at 16:06

## 2024-09-20 RX ADMIN — SODIUM CHLORIDE, POTASSIUM CHLORIDE, SODIUM LACTATE AND CALCIUM CHLORIDE: 600; 310; 30; 20 INJECTION, SOLUTION INTRAVENOUS at 20:18

## 2024-09-20 RX ADMIN — Medication 10 MG: at 09:13

## 2024-09-20 RX ADMIN — HYDROMORPHONE HYDROCHLORIDE 0.5 MG: 1 INJECTION, SOLUTION INTRAMUSCULAR; INTRAVENOUS; SUBCUTANEOUS at 09:16

## 2024-09-20 RX ADMIN — KETOROLAC TROMETHAMINE 15 MG: 15 INJECTION, SOLUTION INTRAMUSCULAR; INTRAVENOUS at 12:57

## 2024-09-20 RX ADMIN — Medication 100 MCG: at 08:53

## 2024-09-20 RX ADMIN — ACETAMINOPHEN 1000 MG: 650 SOLUTION ORAL at 20:12

## 2024-09-20 ASSESSMENT — PAIN - FUNCTIONAL ASSESSMENT
PAIN_FUNCTIONAL_ASSESSMENT: ADULT NONVERBAL PAIN SCALE (NPVS)
PAIN_FUNCTIONAL_ASSESSMENT: 0-10

## 2024-09-20 ASSESSMENT — PAIN DESCRIPTION - LOCATION
LOCATION: ABDOMEN
LOCATION: ABDOMEN

## 2024-09-20 ASSESSMENT — PAIN SCALES - GENERAL
PAINLEVEL_OUTOF10: 8
PAINLEVEL_OUTOF10: 7
PAINLEVEL_OUTOF10: 8
PAINLEVEL_OUTOF10: 8

## 2024-09-20 ASSESSMENT — PAIN DESCRIPTION - DESCRIPTORS: DESCRIPTORS: ACHING

## 2024-09-20 ASSESSMENT — PAIN DESCRIPTION - ORIENTATION
ORIENTATION: RIGHT
ORIENTATION: INNER

## 2024-09-20 NOTE — CONSULTS
Today's Date: 9/20/2024  Patient Name: Yuri Phillips  Date of admission: 9/20/2024  6:53 AM  Patient's age: 44 y.o., 1980  Admission Dx: Cancer of sigmoid (HCC) [C18.7]  S/P left colectomy [Z90.49]    Reason for Consult: management recommendations  Requesting Physician: Keshia Villar MD    Chief Complaint: Elective ex lap with low anterior resection and flexible sigmoidoscopy.    History Obtained From:  patient, electronic medical record    History of Present Illness:      The patient is a 44 y.o. male with no significant past medical history presented initially to [facility] on 8/7 with chief complaints of of abdominal pain.  CT abdomen done at that time showed segmental circumferential wall thickening with the rib enhancing fluid collection measuring 5.8 cm x 2.2 x 3.2 cm concerning for colitis/proctitis.  Also had enlarged retroperitoneal lymph nodes measuring 2.2 x 1.9 cm.  GI evaluated the patient, patient underwent colonoscopy, he was found to have circumferential mucosal abnormality and the scope could not be passed above 20 cm from the anus.  Biopsies were obtained.  And he was discharged home on Levaquin and Flagyl.  His biopsy results came back positive for colonic adenocarcinoma with signet ring features.  Patient came in today for elective abdominal exploration with general surgery.  Underwent flexible sigmoidoscopy, exploratory laparotomy, creation of end colostomy and resection of multiple peritoneal masses.    Hematology oncology consulted for further management.    Past Medical History:   has a past medical history of Colitis, Colon cancer (HCC), GERD (gastroesophageal reflux disease), Migraines, Seizure (HCC), Under care of service provider, and Wears glasses.    Past Surgical History:   has a past surgical history that includes Colonoscopy (N/A, 08/09/2024); Abdominal exploration surgery (N/A, 09/20/2024); and Flexible sigmoidoscopy (N/A, 09/20/2024).     Medications:    Prior to  myalgias, arthralgias, pain, joint swelling, and bone pain.  Neurological: negative for headaches, dizziness, seizures, weakness, numbness.     PHYSICAL EXAM:      BP (!) 152/90   Pulse 52   Temp 97.6 °F (36.4 °C) (Oral)   Resp 18   Ht 1.702 m (5' 7\")   Wt 95.3 kg (210 lb)   SpO2 98%   BMI 32.89 kg/m²    Temp (24hrs), Av.4 °F (36.3 °C), Min:96.8 °F (36 °C), Max:98 °F (36.7 °C)      General:  Pleasant and cooperative. No apparent distress.  HEENT:  Normocephalic, atraumatic, mucus membranes moist.   Neck:  Trachea midline. No adenopathy.  Chest: Symmetric chest rise. Lungs clear to auscultation bilaterally, no wheezes, rales, crackles or rhonchi. No accessory muscle use.  CV: Regular rhythm. No murmur, no gallops, no rubs.  Abdomen: + Tenderness,+ colostomy abdomen is soft, non-tender, non-distended, no rebound or guarding. Bowel sounds active.  Extremities:  No lower extremity edema or cyanosis, peripheral pulses intact.  Neurological:  A&O x3, no focal deficits.  Skin:  Warm and dry.    Labs:   Complete Blood Count: No results for input(s): \"WBC\", \"HGB\", \"MCV\", \"PLT\", \"RBC\", \"HCT\", \"MCH\", \"MCHC\", \"RDW\", \"MPV\" in the last 72 hours.   PT/INR:    Lab Results   Component Value Date/Time    PROTIME 14.6 2024 05:49 AM    INR 1.1 2024 05:49 AM     PTT:  No results found for: \"APTT\"    Basal Metabolic Profile:   No results for input(s): \"NA\", \"K\", \"BUN\", \"CREATININE\", \"CL\", \"CO2\" in the last 72 hours.    Invalid input(s): \"ANION GAP\"   LFTs  No results for input(s): \"ALKPHOS\", \"ALT\", \"AST\", \"BILITOT\", \"BILIDIR\", \"LABALBU\" in the last 72 hours.    Imaging:  No results found.     Impression:   Primary Problem  S/P left colectomy  Active Hospital Problems    Diagnosis Date Noted    S/P left colectomy [Z90.49] 2024         Assessment:  Colonic adenocarcinoma.    Unresectable colon cancer from rectum to the proximal  sigmoid colon, large 5 cm mass at the root of small bowel mesentery, multiple areas

## 2024-09-20 NOTE — BRIEF OP NOTE
Brief Postoperative Note      Patient: Yuri Phillips  YOB: 1980  MRN: [id number]    Date of Procedure: 9/20/2024    Pre-Op Diagnosis Codes:      * Cancer of sigmoid (HCC) [C18.7]    Post-Op Diagnosis: Same       Procedure(s):  Flexible sigmoidoscopy, exploratory laparotomy, creation of end colostomy, resection of multiple peritoneal masses. Execise  of  umbilical mass    Surgeon(s):  Keshia Villar MD    Assistant:  Resident: Mony Garcia MD; Donny Gillespie DO    Anesthesia: General    Estimated Blood Loss (mL): less than 50     Complications: None    Specimens:   ID Type Source Tests Collected by Time Destination   1 : URINE Urine Urine, indwelling catheter CULTURE, URINE Keshia Villar MD 9/20/2024 0951    A : PERITONEUM MASS Tissue Peritoneum SURGICAL PATHOLOGY Keshia Villar MD 9/20/2024 0900    B : UMBILICAL MASS Tissue Peritoneum SURGICAL PATHOLOGY Keshia Villar MD 9/20/2024 0934        Implants:  * No implants in log *      Drains:   Colostomy LUQ (Active)   Stomal Appliance Clean, dry & intact 09/20/24 1113   Stoma  Assessment [place] 09/20/24 1113   Peristomal Assessment Clean, dry & intact 09/20/24 1113       Urinary Catheter 09/20/24 (Active)   Urine Color Yellow 09/20/24 1113   Urine Appearance Clear 09/20/24 1113   Collection Container Standard 09/20/24 1113   Securement Method Leg strap 09/20/24 1113   Catheter Best Practices  Drainage tube clipped to bed;Catheter secured to thigh;Tamper seal intact;Bag below bladder;Lack of dependent loop in tubing;Bag not on floor;Drainage bag less than half full 09/20/24 1113   Status Draining 09/20/24 1113       Findings:  Infection Present At Time Of Surgery (PATOS) (choose all levels that have infection present):  No infection present  Other Findings: Unresectable colon cancer from the rectum to the proximal sigmoid, large 5cm mass at root of small bowel mesentery, multiple areas of peritoneal studding, multiple biopsies performed. Creation of end

## 2024-09-20 NOTE — OP NOTE
Operative Note      Patient: Yuri Phillips  YOB: 1980  MRN: [id number]    Date of Procedure: 9/20/2024    Pre-Op Diagnosis Codes:      * Cancer of sigmoid (HCC) [C18.7]    Post-Op Diagnosis: Same       Procedure(s):  Flexible sigmoidoscopy, exploratory laparotomy, creation of end colostomy, resection of multiple peritoneal masses.     Surgeon(s):  Keshia Villar MD    Assistant:   Resident: Mony Garcia MD; Donny Gillespie DO    Anesthesia: General    Estimated Blood Loss (mL): less than 50     Complications: None    Specimens:   ID Type Source Tests Collected by Time Destination   1 : URINE Urine Urine, indwelling catheter CULTURE, URINE Keshia Villar MD 9/20/2024 0951    A : PERITONEUM MASS Tissue Peritoneum SURGICAL PATHOLOGY Keshia Villar MD 9/20/2024 0900    B : UMBILICAL MASS Tissue Peritoneum SURGICAL PATHOLOGY Keshia Villar MD 9/20/2024 0934        Implants:  * No implants in log *      Drains:   Colostomy LUQ (Active)   Stomal Appliance Clean, dry & intact 09/20/24 1113   Stoma  Assessment [place] 09/20/24 1113   Peristomal Assessment Clean, dry & intact 09/20/24 1113       Urinary Catheter 09/20/24 (Active)   Urine Color Yellow 09/20/24 1113   Urine Appearance Clear 09/20/24 1113   Collection Container Standard 09/20/24 1113   Securement Method Leg strap 09/20/24 1113   Catheter Best Practices  Drainage tube clipped to bed;Catheter secured to thigh;Tamper seal intact;Bag below bladder;Lack of dependent loop in tubing;Bag not on floor;Drainage bag less than half full 09/20/24 1113   Status Draining 09/20/24 1113       Findings:  Infection Present At Time Of Surgery (PATOS) (choose all levels that have infection present):  No infection present  Other Findings: Unresectable colon cancer from the rectum to the proximal sigmoid, large 5cm mass at root of small bowel mesentery, multiple areas of peritoneal studding, multiple biopsies performed. Creation of end colostomy   Colon Resection  Operation

## 2024-09-20 NOTE — H&P
History and Physical    Pt Name: Yuri Phillips  MRN: [id number]  YOB: 1980  Date of evaluation: 2024  Primary Care Physician: No primary care provider on file.    SUBJECTIVE:   History of Chief Complaint:    Yuri Phillips is a 44 y.o. male who is scheduled today for ABDOMINAL EXPLORATION, LOW ANTERIOR RESECTION and FLEXIBLE SIGMOIDOSCOPY. He reports being in the hospital in 2024, had rectal bleeding and colonoscopy that demonstrated mass. He denies any pain. He reports intermittent rectal bleeding.  Allergies  has No Known Allergies.  Medications  Prior to Admission medications    Medication Sig Start Date End Date Taking? Authorizing Provider   LEVOFLOXACIN PO Take 1 tablet by mouth daily   Yes Sheridan Rodrigues MD   pantoprazole (PROTONIX) 40 MG tablet Take 1 tablet by mouth every morning (before breakfast) 24  Yes Solomon Reid MD   metroNIDAZOLE (FLAGYL PO) Take 1 tablet by mouth in the morning, at noon, and at bedtime  Patient not taking: Reported on 2024    ProviderSheridan MD     Past Medical History    has a past medical history of Colitis, Colon cancer (HCC), GERD (gastroesophageal reflux disease), Migraines, Seizure (HCC), Under care of service provider, and Wears glasses.  Past Surgical History   has a past surgical history that includes Colonoscopy (N/A, 2024).  Social History   reports that he has never smoked. He has never been exposed to tobacco smoke. He has never used smokeless tobacco.   reports that he does not currently use alcohol.   reports no history of drug use.    Family History  Family Status   Relation Name Status    Mother      Father  Alive    Sister  (Not Specified)    Neg Hx  (Not Specified)   No partnership data on file     family history includes Diabetes in his mother and sister; High Blood Pressure in his father.  Review of Systems:  CONSTITUTIONAL:   negative for fevers, chills, fatigue and malaise    EYES:   negative for  double vision, blurred vision and photophobia    HEENT:   negative for tinnitus, epistaxis and sore throat     RESPIRATORY:   negative for cough, shortness of breath, wheezing     CARDIOVASCULAR:   negative for chest pain, palpitations, syncope, edema     GASTROINTESTINAL:   +per HPI +weight loss   GENITOURINARY:   negative for incontinence     MUSCULOSKELETAL:   negative for neck or back pain     NEUROLOGICAL:   Negative for weakness and tingling  negative for headaches and dizziness     PSYCHIATRIC:   negative for anxiety       OBJECTIVE:   VITALS:  temporal temperature is 96.8 °F (36 °C). His blood pressure is 125/88 and his pulse is 67. His respiration is 16 and oxygen saturation is 97%.   CONSTITUTIONAL:alert & oriented x 3, no acute distress. Calm and pleasant.   SKIN:  Warm and dry, no rashes on exposed areas of skin   HEAD:  Normocephalic, atraumatic   EYES: PERRL.  EOMs intact. Wearing glasses  EARS:  Equal bilaterally, no edema or thickening, skin is intact without lumps or lesions. No discharge. Hearing grossly WNL.    NOSE:  Nares patent.  No rhinorrhea   MOUTH/THROAT:  Mucous membranes moist, tongue is pink, uvula midline, teeth appear to be intact.  NECK:Full ROM  LUNGS: Respirations even and non-labored. Clear to auscultation bilaterally, no wheezes, rales, or rhonchi.   CARDIOVASCULAR: Regular rate and rhythm, no murmurs/rubs/gallops   ABDOMEN: soft, non-tender, non-distended, bowel sounds active x 4   EXTREMITIES: No edema bilateral lower extremities.  No varicosities bilateral lower extremities.   NEUROLOGIC: CN II-XII are grossly intact.  Gait not assessed.  IMPRESSIONS:   Cancer of sigmoid     PLANS:   ABDOMINAL EXPLORATION, LOW ANTERIOR RESECTION and FLEXIBLE SIGMOIDOSCOPY     CARLOS CHRISTIANSON CNP   Electronically signed 9/20/2024 at 7:20 AM

## 2024-09-21 LAB
ANION GAP SERPL CALCULATED.3IONS-SCNC: 14 MMOL/L (ref 9–16)
BASOPHILS # BLD: <0.03 K/UL (ref 0–0.2)
BASOPHILS NFR BLD: 0 % (ref 0–2)
BUN SERPL-MCNC: 9 MG/DL (ref 6–20)
CALCIUM SERPL-MCNC: 9.2 MG/DL (ref 8.6–10.4)
CHLORIDE SERPL-SCNC: 101 MMOL/L (ref 98–107)
CO2 SERPL-SCNC: 22 MMOL/L (ref 20–31)
CREAT SERPL-MCNC: 1 MG/DL (ref 0.7–1.2)
EOSINOPHIL # BLD: <0.03 K/UL (ref 0–0.44)
EOSINOPHILS RELATIVE PERCENT: 0 % (ref 1–4)
ERYTHROCYTE [DISTWIDTH] IN BLOOD BY AUTOMATED COUNT: 15 % (ref 11.8–14.4)
GFR, ESTIMATED: >90 ML/MIN/1.73M2
GLUCOSE SERPL-MCNC: 118 MG/DL (ref 74–99)
HCT VFR BLD AUTO: 40.8 % (ref 40.7–50.3)
HGB BLD-MCNC: 13.3 G/DL (ref 13–17)
IMM GRANULOCYTES # BLD AUTO: 0.05 K/UL (ref 0–0.3)
IMM GRANULOCYTES NFR BLD: 0 %
LYMPHOCYTES NFR BLD: 0.96 K/UL (ref 1.1–3.7)
LYMPHOCYTES RELATIVE PERCENT: 7 % (ref 24–43)
MAGNESIUM SERPL-MCNC: 1.8 MG/DL (ref 1.6–2.6)
MCH RBC QN AUTO: 28.2 PG (ref 25.2–33.5)
MCHC RBC AUTO-ENTMCNC: 32.6 G/DL (ref 28.4–34.8)
MCV RBC AUTO: 86.6 FL (ref 82.6–102.9)
MICROORGANISM SPEC CULT: NO GROWTH
MONOCYTES NFR BLD: 1.08 K/UL (ref 0.1–1.2)
MONOCYTES NFR BLD: 8 % (ref 3–12)
NEUTROPHILS NFR BLD: 85 % (ref 36–65)
NEUTS SEG NFR BLD: 11.54 K/UL (ref 1.5–8.1)
NRBC BLD-RTO: 0 PER 100 WBC
PLATELET # BLD AUTO: 435 K/UL (ref 138–453)
PMV BLD AUTO: 8.8 FL (ref 8.1–13.5)
POTASSIUM SERPL-SCNC: 4 MMOL/L (ref 3.7–5.3)
RBC # BLD AUTO: 4.71 M/UL (ref 4.21–5.77)
RBC # BLD: ABNORMAL 10*6/UL
SERVICE CMNT-IMP: NORMAL
SODIUM SERPL-SCNC: 137 MMOL/L (ref 136–145)
SPECIMEN DESCRIPTION: NORMAL
WBC OTHER # BLD: 13.7 K/UL (ref 3.5–11.3)

## 2024-09-21 PROCEDURE — 83735 ASSAY OF MAGNESIUM: CPT

## 2024-09-21 PROCEDURE — 2580000003 HC RX 258: Performed by: STUDENT IN AN ORGANIZED HEALTH CARE EDUCATION/TRAINING PROGRAM

## 2024-09-21 PROCEDURE — 80048 BASIC METABOLIC PNL TOTAL CA: CPT

## 2024-09-21 PROCEDURE — 6360000002 HC RX W HCPCS: Performed by: STUDENT IN AN ORGANIZED HEALTH CARE EDUCATION/TRAINING PROGRAM

## 2024-09-21 PROCEDURE — 99232 SBSQ HOSP IP/OBS MODERATE 35: CPT | Performed by: INTERNAL MEDICINE

## 2024-09-21 PROCEDURE — 1200000000 HC SEMI PRIVATE

## 2024-09-21 PROCEDURE — 6360000002 HC RX W HCPCS

## 2024-09-21 PROCEDURE — 6370000000 HC RX 637 (ALT 250 FOR IP): Performed by: STUDENT IN AN ORGANIZED HEALTH CARE EDUCATION/TRAINING PROGRAM

## 2024-09-21 PROCEDURE — 36415 COLL VENOUS BLD VENIPUNCTURE: CPT

## 2024-09-21 PROCEDURE — 85025 COMPLETE CBC W/AUTO DIFF WBC: CPT

## 2024-09-21 RX ORDER — MAGNESIUM SULFATE IN WATER 40 MG/ML
2000 INJECTION, SOLUTION INTRAVENOUS ONCE
Status: COMPLETED | OUTPATIENT
Start: 2024-09-21 | End: 2024-09-21

## 2024-09-21 RX ADMIN — SODIUM CHLORIDE, PRESERVATIVE FREE 10 ML: 5 INJECTION INTRAVENOUS at 00:15

## 2024-09-21 RX ADMIN — SODIUM CHLORIDE, POTASSIUM CHLORIDE, SODIUM LACTATE AND CALCIUM CHLORIDE: 600; 310; 30; 20 INJECTION, SOLUTION INTRAVENOUS at 04:31

## 2024-09-21 RX ADMIN — Medication 2000 MG: at 00:15

## 2024-09-21 RX ADMIN — KETOROLAC TROMETHAMINE 15 MG: 15 INJECTION, SOLUTION INTRAMUSCULAR; INTRAVENOUS at 18:13

## 2024-09-21 RX ADMIN — SODIUM CHLORIDE, PRESERVATIVE FREE 10 ML: 5 INJECTION INTRAVENOUS at 05:40

## 2024-09-21 RX ADMIN — KETOROLAC TROMETHAMINE 15 MG: 15 INJECTION, SOLUTION INTRAMUSCULAR; INTRAVENOUS at 11:00

## 2024-09-21 RX ADMIN — Medication 2000 MG: at 08:09

## 2024-09-21 RX ADMIN — MAGNESIUM SULFATE HEPTAHYDRATE 2000 MG: 40 INJECTION, SOLUTION INTRAVENOUS at 11:00

## 2024-09-21 RX ADMIN — ENOXAPARIN SODIUM 40 MG: 100 INJECTION SUBCUTANEOUS at 08:09

## 2024-09-21 RX ADMIN — FENTANYL CITRATE 25 MCG: 50 INJECTION, SOLUTION INTRAMUSCULAR; INTRAVENOUS at 15:54

## 2024-09-21 RX ADMIN — SODIUM CHLORIDE, POTASSIUM CHLORIDE, SODIUM LACTATE AND CALCIUM CHLORIDE: 600; 310; 30; 20 INJECTION, SOLUTION INTRAVENOUS at 21:19

## 2024-09-21 RX ADMIN — METRONIDAZOLE 500 MG: 500 INJECTION, SOLUTION INTRAVENOUS at 08:19

## 2024-09-21 RX ADMIN — SODIUM CHLORIDE: 9 INJECTION, SOLUTION INTRAVENOUS at 00:27

## 2024-09-21 RX ADMIN — ENOXAPARIN SODIUM 40 MG: 100 INJECTION SUBCUTANEOUS at 21:19

## 2024-09-21 RX ADMIN — ONDANSETRON 4 MG: 4 TABLET, ORALLY DISINTEGRATING ORAL at 10:57

## 2024-09-21 RX ADMIN — SODIUM CHLORIDE, PRESERVATIVE FREE 10 ML: 5 INJECTION INTRAVENOUS at 08:10

## 2024-09-21 RX ADMIN — KETOROLAC TROMETHAMINE 15 MG: 15 INJECTION, SOLUTION INTRAMUSCULAR; INTRAVENOUS at 00:07

## 2024-09-21 RX ADMIN — ACETAMINOPHEN 1000 MG: 650 SOLUTION ORAL at 05:37

## 2024-09-21 RX ADMIN — PANTOPRAZOLE SODIUM 40 MG: 40 TABLET, DELAYED RELEASE ORAL at 08:09

## 2024-09-21 RX ADMIN — METRONIDAZOLE 500 MG: 500 INJECTION, SOLUTION INTRAVENOUS at 00:28

## 2024-09-21 RX ADMIN — KETOROLAC TROMETHAMINE 15 MG: 15 INJECTION, SOLUTION INTRAMUSCULAR; INTRAVENOUS at 05:40

## 2024-09-21 ASSESSMENT — PAIN SCALES - GENERAL
PAINLEVEL_OUTOF10: 9
PAINLEVEL_OUTOF10: 4
PAINLEVEL_OUTOF10: 4
PAINLEVEL_OUTOF10: 1

## 2024-09-21 ASSESSMENT — PAIN DESCRIPTION - ORIENTATION
ORIENTATION: MID
ORIENTATION: RIGHT

## 2024-09-21 ASSESSMENT — PAIN DESCRIPTION - LOCATION
LOCATION: ABDOMEN

## 2024-09-21 ASSESSMENT — PAIN DESCRIPTION - DESCRIPTORS
DESCRIPTORS: ACHING
DESCRIPTORS: ACHING

## 2024-09-21 NOTE — PROGRESS NOTES
Comprehensive Nutrition Assessment    Type and Reason for Visit:  Initial, Positive Nutrition Screen (Weight Loss; Poor Intakes/Appetite)    Nutrition Recommendations/Plan:   Continue NPO.  Monitor for start of oral diet.  Will monitor labs, weights, and plan of care.     Malnutrition Assessment:  Malnutrition Status:  Moderate malnutrition (09/21/24 1522)    Context:  Chronic Illness     Findings of the 6 clinical characteristics of malnutrition:  Energy Intake:  75% or less estimated energy requirements for 1 month or longer  Weight Loss:  Mild weight loss - 27% x past 7 months per chart review - ? accuracy of weights.     Body Fat Loss:  Mild body fat loss Orbital   Muscle Mass Loss:  No significant muscle mass loss  Fluid Accumulation:  No significant fluid accumulation   Strength:  Not Performed    Nutrition Assessment:    Admitted for colonic mass and s/p exploratory laparotomy and planned low anterior resection; the mass was unable to be resected due to size, decision was made to proceed with end colostomy and biopsies of peritoneal lesions.  PMH: colitis/crohn's, colon cancer, GERD.  Pt remains NPO currently awaiting return of bowel function.  Pt reports beginning of August going to hospital with c/o abdominal pain, diarrhea, pain, and vomiting which had been going on for 2 months.  Pt reports previously trying to lose weight and that he has lost 70 lb in the past 7 months.  Per chart review, weight loss of 27% x 7 months noted.  Labs/Meds reviewed.    Nutrition Related Findings:    Hypoactive bowel sounds.  +Colostomy. Wound Type: Surgical Incision (to abdomen)       Current Nutrition Intake & Therapies:    Average Meal Intake: NPO  Average Supplements Intake: NPO  Diet NPO Exceptions are: Ice Chips, Sips of Water with Meds  Additional Calorie Sources:  None    Anthropometric Measures:  Height: 170.2 cm (5' 7.01\")  Ideal Body Weight (IBW): 148 lbs (67 kg)    Admission Body Weight: 95.3 kg (210 lb 1.6

## 2024-09-21 NOTE — PLAN OF CARE
Problem: Discharge Planning  Goal: Discharge to home or other facility with appropriate resources  9/21/2024 1218 by Sami Dunn RN  Outcome: Progressing  9/21/2024 0513 by Joi Bradley RN  Outcome: Progressing     Problem: Pain  Goal: Verbalizes/displays adequate comfort level or baseline comfort level  9/21/2024 1218 by Sami Dunn RN  Outcome: Progressing  9/21/2024 0513 by Joi Bradley RN  Outcome: Progressing     Problem: Safety - Adult  Goal: Free from fall injury  9/21/2024 1218 by Sami Dunn RN  Outcome: Progressing  9/21/2024 0513 by Joi Bradley RN  Outcome: Progressing

## 2024-09-21 NOTE — PROGRESS NOTES
Value Date/Time     09/21/2024 07:52 AM    K 4.0 09/21/2024 07:52 AM     09/21/2024 07:52 AM    CO2 22 09/21/2024 07:52 AM    BUN 9 09/21/2024 07:52 AM    CREATININE 1.0 09/21/2024 07:52 AM    CALCIUM 9.2 09/21/2024 07:52 AM    LABGLOM >90 09/21/2024 07:52 AM    GLUCOSE 118 09/21/2024 07:52 AM       Radiology Review:    No results found.      ASSESSMENT:  Active Hospital Problems    Diagnosis Date Noted    S/P left colectomy [Z90.49] 09/20/2024       44 y.o. male with colonic mass, presents for exploratory laparotomy and planned low anterior resection. Intraoperative, the mass was unable to be resected due to size. Decision was made to proceed with end colostomy and biopsies of peritoneal lesions.    Plan:  Diet: NPO until having bowel function  Chance removal today, void check  MMPT  Lovenox for DVT ppx  Heme/onc consulted, plan for outpatient follow up  Ok to change midline laparotomy dressing  AROBF  PT/OT  Ambulate TID    Electronically signed by Madai Orr DO  on 9/21/2024 at 10:32 AM

## 2024-09-21 NOTE — PROGRESS NOTES
Today's Date: 9/21/2024  Patient Name: Yuri Phillips  Date of admission: 9/20/2024  6:53 AM  Patient's age: 44 y.o., 1980  Admission Dx: Cancer of sigmoid (HCC) [C18.7]  S/P left colectomy [Z90.49]    Reason for Consult: management recommendations  Requesting Physician: Keshia Villar MD    Chief Complaint: Elective ex lap with low anterior resection and flexible sigmoidoscopy.    History Obtained From:  patient, electronic medical record        Interval history:    Patient seen and examined  Laboratory reviewed  Patient postop day #1  Awaiting pathology report  No new complaint interval event  Abdominal pain is controlled  Remains n.p.o.  Hemoglobin 13.3  CEA 58    History of Present Illness:      The patient is a 44 y.o. male with no significant past medical history presented initially to [facility] on 8/7 with chief complaints of of abdominal pain.  CT abdomen done at that time showed segmental circumferential wall thickening with the rib enhancing fluid collection measuring 5.8 cm x 2.2 x 3.2 cm concerning for colitis/proctitis.  Also had enlarged retroperitoneal lymph nodes measuring 2.2 x 1.9 cm.  GI evaluated the patient, patient underwent colonoscopy, he was found to have circumferential mucosal abnormality and the scope could not be passed above 20 cm from the anus.  Biopsies were obtained.  And he was discharged home on Levaquin and Flagyl.  His biopsy results came back positive for colonic adenocarcinoma with signet ring features.  Patient came in today for elective abdominal exploration with general surgery.  Underwent flexible sigmoidoscopy, exploratory laparotomy, creation of end colostomy and resection of multiple peritoneal masses.    Hematology oncology consulted for further management.    Past Medical History:   has a past medical history of Colitis, Colon cancer (HCC), GERD (gastroesophageal reflux disease), Migraines, Seizure (HCC), Under care of service provider, and Wears  disease.    Plan:  I reviewed the labs/imaging available to me,outside records and discussed with the patient.I explained to the patient the nature of this problem. I explained the significance of these abnormalities and possible etiology and management options  CEA is elevated  Overall patient highly concerning for malignancy.  Follow-up on final path report  Will obtain CT chest tomorrow  Further recommendations from medical oncology once pathology report is back  Symptomatic with supportive care    Electronically signed by JEISON YANG MD                  This note is created with the assistance of a speech recognition program.  While intending to generate a document that actually reflects the content of the visit, the document can still have some errors including those of syntax and sound a like substitutions which may escape proof reading.  It such instances, actual meaning can be extrapolated by contextual diversion.

## 2024-09-22 ENCOUNTER — APPOINTMENT (OUTPATIENT)
Dept: CT IMAGING | Age: 44
DRG: 330 | End: 2024-09-22
Attending: COLON & RECTAL SURGERY
Payer: COMMERCIAL

## 2024-09-22 ENCOUNTER — APPOINTMENT (OUTPATIENT)
Dept: GENERAL RADIOLOGY | Age: 44
DRG: 330 | End: 2024-09-22
Attending: COLON & RECTAL SURGERY
Payer: COMMERCIAL

## 2024-09-22 LAB
ANION GAP SERPL CALCULATED.3IONS-SCNC: 9 MMOL/L (ref 9–16)
BASOPHILS # BLD: <0.03 K/UL (ref 0–0.2)
BASOPHILS NFR BLD: 0 % (ref 0–2)
BUN SERPL-MCNC: 10 MG/DL (ref 6–20)
CALCIUM SERPL-MCNC: 9.2 MG/DL (ref 8.6–10.4)
CHLORIDE SERPL-SCNC: 103 MMOL/L (ref 98–107)
CO2 SERPL-SCNC: 25 MMOL/L (ref 20–31)
CREAT SERPL-MCNC: 0.9 MG/DL (ref 0.7–1.2)
EOSINOPHIL # BLD: <0.03 K/UL (ref 0–0.44)
EOSINOPHILS RELATIVE PERCENT: 0 % (ref 1–4)
ERYTHROCYTE [DISTWIDTH] IN BLOOD BY AUTOMATED COUNT: 15.3 % (ref 11.8–14.4)
GFR, ESTIMATED: >90 ML/MIN/1.73M2
GLUCOSE SERPL-MCNC: 113 MG/DL (ref 74–99)
HCT VFR BLD AUTO: 36.2 % (ref 40.7–50.3)
HGB BLD-MCNC: 11.5 G/DL (ref 13–17)
IMM GRANULOCYTES # BLD AUTO: 0.04 K/UL (ref 0–0.3)
IMM GRANULOCYTES NFR BLD: 0 %
LYMPHOCYTES NFR BLD: 1.16 K/UL (ref 1.1–3.7)
LYMPHOCYTES RELATIVE PERCENT: 10 % (ref 24–43)
MAGNESIUM SERPL-MCNC: 2.2 MG/DL (ref 1.6–2.6)
MCH RBC QN AUTO: 27.5 PG (ref 25.2–33.5)
MCHC RBC AUTO-ENTMCNC: 31.8 G/DL (ref 28.4–34.8)
MCV RBC AUTO: 86.6 FL (ref 82.6–102.9)
MONOCYTES NFR BLD: 1.05 K/UL (ref 0.1–1.2)
MONOCYTES NFR BLD: 9 % (ref 3–12)
NEUTROPHILS NFR BLD: 81 % (ref 36–65)
NEUTS SEG NFR BLD: 9.54 K/UL (ref 1.5–8.1)
NRBC BLD-RTO: 0 PER 100 WBC
PLATELET # BLD AUTO: 388 K/UL (ref 138–453)
PMV BLD AUTO: 8.8 FL (ref 8.1–13.5)
POTASSIUM SERPL-SCNC: 4.3 MMOL/L (ref 3.7–5.3)
RBC # BLD AUTO: 4.18 M/UL (ref 4.21–5.77)
RBC # BLD: ABNORMAL 10*6/UL
SODIUM SERPL-SCNC: 137 MMOL/L (ref 136–145)
WBC OTHER # BLD: 11.8 K/UL (ref 3.5–11.3)

## 2024-09-22 PROCEDURE — 6360000004 HC RX CONTRAST MEDICATION: Performed by: INTERNAL MEDICINE

## 2024-09-22 PROCEDURE — 71260 CT THORAX DX C+: CPT

## 2024-09-22 PROCEDURE — 74018 RADEX ABDOMEN 1 VIEW: CPT

## 2024-09-22 PROCEDURE — 6360000002 HC RX W HCPCS: Performed by: STUDENT IN AN ORGANIZED HEALTH CARE EDUCATION/TRAINING PROGRAM

## 2024-09-22 PROCEDURE — 85025 COMPLETE CBC W/AUTO DIFF WBC: CPT

## 2024-09-22 PROCEDURE — 99232 SBSQ HOSP IP/OBS MODERATE 35: CPT | Performed by: INTERNAL MEDICINE

## 2024-09-22 PROCEDURE — 80048 BASIC METABOLIC PNL TOTAL CA: CPT

## 2024-09-22 PROCEDURE — 2580000003 HC RX 258: Performed by: STUDENT IN AN ORGANIZED HEALTH CARE EDUCATION/TRAINING PROGRAM

## 2024-09-22 PROCEDURE — 2580000003 HC RX 258

## 2024-09-22 PROCEDURE — 97166 OT EVAL MOD COMPLEX 45 MIN: CPT

## 2024-09-22 PROCEDURE — 36415 COLL VENOUS BLD VENIPUNCTURE: CPT

## 2024-09-22 PROCEDURE — 6370000000 HC RX 637 (ALT 250 FOR IP): Performed by: STUDENT IN AN ORGANIZED HEALTH CARE EDUCATION/TRAINING PROGRAM

## 2024-09-22 PROCEDURE — 97535 SELF CARE MNGMENT TRAINING: CPT

## 2024-09-22 PROCEDURE — 1200000000 HC SEMI PRIVATE

## 2024-09-22 PROCEDURE — 6360000002 HC RX W HCPCS

## 2024-09-22 PROCEDURE — 83735 ASSAY OF MAGNESIUM: CPT

## 2024-09-22 PROCEDURE — 97530 THERAPEUTIC ACTIVITIES: CPT

## 2024-09-22 RX ORDER — IOPAMIDOL 755 MG/ML
75 INJECTION, SOLUTION INTRAVASCULAR
Status: COMPLETED | OUTPATIENT
Start: 2024-09-22 | End: 2024-09-22

## 2024-09-22 RX ADMIN — KETOROLAC TROMETHAMINE 15 MG: 15 INJECTION, SOLUTION INTRAMUSCULAR; INTRAVENOUS at 18:34

## 2024-09-22 RX ADMIN — ONDANSETRON 4 MG: 4 TABLET, ORALLY DISINTEGRATING ORAL at 01:01

## 2024-09-22 RX ADMIN — KETOROLAC TROMETHAMINE 15 MG: 15 INJECTION, SOLUTION INTRAMUSCULAR; INTRAVENOUS at 00:16

## 2024-09-22 RX ADMIN — SODIUM CHLORIDE, POTASSIUM CHLORIDE, SODIUM LACTATE AND CALCIUM CHLORIDE: 600; 310; 30; 20 INJECTION, SOLUTION INTRAVENOUS at 05:46

## 2024-09-22 RX ADMIN — ENOXAPARIN SODIUM 40 MG: 100 INJECTION SUBCUTANEOUS at 08:25

## 2024-09-22 RX ADMIN — IOPAMIDOL 75 ML: 755 INJECTION, SOLUTION INTRAVENOUS at 17:55

## 2024-09-22 RX ADMIN — ONDANSETRON 4 MG: 2 INJECTION INTRAMUSCULAR; INTRAVENOUS at 12:07

## 2024-09-22 RX ADMIN — KETOROLAC TROMETHAMINE 15 MG: 15 INJECTION, SOLUTION INTRAMUSCULAR; INTRAVENOUS at 05:53

## 2024-09-22 RX ADMIN — ENOXAPARIN SODIUM 40 MG: 100 INJECTION SUBCUTANEOUS at 20:37

## 2024-09-22 RX ADMIN — ONDANSETRON 4 MG: 2 INJECTION INTRAMUSCULAR; INTRAVENOUS at 18:34

## 2024-09-22 RX ADMIN — SODIUM CHLORIDE, PRESERVATIVE FREE 40 MG: 5 INJECTION INTRAVENOUS at 08:25

## 2024-09-22 RX ADMIN — SODIUM CHLORIDE, POTASSIUM CHLORIDE, SODIUM LACTATE AND CALCIUM CHLORIDE: 600; 310; 30; 20 INJECTION, SOLUTION INTRAVENOUS at 18:33

## 2024-09-22 RX ADMIN — SODIUM CHLORIDE, PRESERVATIVE FREE 10 ML: 5 INJECTION INTRAVENOUS at 08:26

## 2024-09-22 RX ADMIN — KETOROLAC TROMETHAMINE 15 MG: 15 INJECTION, SOLUTION INTRAMUSCULAR; INTRAVENOUS at 12:04

## 2024-09-22 RX ADMIN — SODIUM CHLORIDE, PRESERVATIVE FREE 10 ML: 5 INJECTION INTRAVENOUS at 20:38

## 2024-09-22 ASSESSMENT — PAIN SCALES - GENERAL: PAINLEVEL_OUTOF10: 0

## 2024-09-22 NOTE — PROGRESS NOTES
Colorectal Surgery:  Daily Progress Note          POD # 2 s/p exploratory laparotomy, creation of end colostomy, resection of multiple peritoneal mases          PATIENT NAME: Yuri Phillips     TODAY'S DATE: 9/22/2024, 7:08 AM  CC:  nausea, bilious emesis    SUBJECTIVE:     Pt seen and examined at bedside. Pt with bilious emesis this morning that he attributes to heartburn. Remains NPO on mIVF. Voiding independently. Has been unable to ambulate much due to discomfort. No ostomy output.    OBJECTIVE:   VITALS:  /89   Pulse 71   Temp 99.5 °F (37.5 °C) (Oral)   Resp 16   Ht 1.702 m (5' 7.01\")   Wt 95.3 kg (210 lb)   SpO2 96%   BMI 32.88 kg/m²      INTAKE/OUTPUT:      Intake/Output Summary (Last 24 hours) at 9/22/2024 0708  Last data filed at 9/22/2024 0347  Gross per 24 hour   Intake 2663.73 ml   Output 100 ml   Net 2563.73 ml       PHYSICAL EXAM:  General Appearance: awake, alert, oriented, in no acute distress  HEENT:  Normocephalic, atraumatic, mucus membranes moist   Lungs: symmetric chest wall expansion, no accessory muscle use  Abdomen:soft, nondistended, tender to palpation, surgical dressing with strike through, left sided colostomy is pink with thin blood output in appliance   Extremities: No cyanosis, pitting edema, rashes noted.    Skin: Skin color, texture, turgor normal. No rashes or lesions.      Data:  CBC with Differential:    Lab Results   Component Value Date/Time    WBC 11.8 09/22/2024 04:47 AM    RBC 4.18 09/22/2024 04:47 AM    HGB 11.5 09/22/2024 04:47 AM    HCT 36.2 09/22/2024 04:47 AM     09/22/2024 04:47 AM    MCV 86.6 09/22/2024 04:47 AM    MCH 27.5 09/22/2024 04:47 AM    MCHC 31.8 09/22/2024 04:47 AM    RDW 15.3 09/22/2024 04:47 AM    LYMPHOPCT 10 09/22/2024 04:47 AM    MONOPCT 9 09/22/2024 04:47 AM    EOSPCT 0 09/22/2024 04:47 AM    BASOPCT 0 09/22/2024 04:47 AM    MONOSABS 1.05 09/22/2024 04:47 AM    LYMPHSABS 1.16 09/22/2024 04:47 AM    EOSABS <0.03 09/22/2024 04:47 AM     BASOSABS <0.03 09/22/2024 04:47 AM     BMP:    Lab Results   Component Value Date/Time     09/22/2024 04:47 AM    K 4.3 09/22/2024 04:47 AM     09/22/2024 04:47 AM    CO2 25 09/22/2024 04:47 AM    BUN 10 09/22/2024 04:47 AM    CREATININE 0.9 09/22/2024 04:47 AM    CALCIUM 9.2 09/22/2024 04:47 AM    LABGLOM >90 09/22/2024 04:47 AM    GLUCOSE 113 09/22/2024 04:47 AM       Radiology Review:    No results found.      ASSESSMENT:  Active Hospital Problems    Diagnosis Date Noted    S/P left colectomy [Z90.49] 09/20/2024       44 y.o. male with colonic mass, presents for exploratory laparotomy and planned low anterior resection. Intraoperative, the mass was unable to be resected due to size. Decision was made to proceed with end colostomy and biopsies of peritoneal lesions.    Plan:  Diet: NPO until having bowel function  AM KUB pending for bilious emesis  MMPT  Lovenox for DVT ppx  Heme/onc consulted, plan for outpatient follow up  Ok to change midline laparotomy dressing  AROBF  PT/OT  Ambulate TID    Electronically signed by Madai Orr DO  on 9/22/2024 at 7:08 AM   I Dr. Villar saw and examined the patient. I have edited the above and agree with the above.     Keshia Villar  Colorectal Surgery

## 2024-09-22 NOTE — CARE COORDINATION
Case Management Assessment  Initial Evaluation    Date/Time of Evaluation: 9/21/2024 8:18 PM  Assessment Completed by: ZACHARY ACOSTA RN    If patient is discharged prior to next notation, then this note serves as note for discharge by case management.    Patient Name: Yuri Phillips                   YOB: 1980  Diagnosis: Cancer of sigmoid (HCC) [C18.7]  S/P left colectomy [Z90.49]                   Date / Time: 9/20/2024  6:53 AM    Patient Admission Status: Inpatient   Readmission Risk (Low < 19, Mod (19-27), High > 27): Readmission Risk Score: 8.6    Current PCP: No primary care provider on file.  PCP verified by CM?  (none, gave list)    Chart Reviewed: Yes      History Provided by: Patient  Patient Orientation: Alert and Oriented    Patient Cognition: Alert    Hospitalization in the last 30 days (Readmission):  No    If yes, Readmission Assessment in CM Navigator will be completed.    Advance Directives:      Code Status: Full Code   Patient's Primary Decision Maker is: Legal Next of Kin      Discharge Planning:    Patient lives with: Alone Type of Home: House  Primary Care Giver: Self  Patient Support Systems include: Family Members   Current Financial resources: Medicaid  Current community resources: None  Current services prior to admission: None            Current DME:  none            Type of Home Care services:  None    ADLS  Prior functional level: Independent in ADLs/IADLs  Current functional level: Independent in ADLs/IADLs    PT AM-PAC:   /24  OT AM-PAC:   /24    Family can provide assistance at DC: Yes  Would you like Case Management to discuss the discharge plan with any other family members/significant others, and if so, who? No  Plans to Return to Present Housing: Yes  Other Identified Issues/Barriers to RETURNING to current housing: none  Potential Assistance needed at discharge:  (ostomy education to independence for discharge)              Patient expects to discharge to:

## 2024-09-22 NOTE — PLAN OF CARE
Problem: Discharge Planning  Goal: Discharge to home or other facility with appropriate resources  Outcome: Progressing     Problem: Pain  Goal: Verbalizes/displays adequate comfort level or baseline comfort level  Outcome: Progressing     Problem: Safety - Adult  Goal: Free from fall injury  Outcome: Progressing  Flowsheets (Taken 9/21/2024 2049)  Free From Fall Injury: Instruct family/caregiver on patient safety     Problem: Nutrition Deficit:  Goal: Optimize nutritional status  Outcome: Progressing     Problem: ABCDS Injury Assessment  Goal: Absence of physical injury  Outcome: Progressing

## 2024-09-22 NOTE — PROGRESS NOTES
Today's Date: 9/22/2024  Patient Name: Yuri Phillips  Date of admission: 9/20/2024  6:53 AM  Patient's age: 44 y.o., 1980  Admission Dx: Cancer of sigmoid (HCC) [C18.7]  S/P left colectomy [Z90.49]    Reason for Consult: management recommendations  Requesting Physician: Keshia Villar MD    Chief Complaint: Elective ex lap with low anterior resection and flexible sigmoidoscopy.    History Obtained From:  patient, electronic medical record        Interval history:    Patient seen and examined at bedside.  No acute events overnight.  Postop day 2.  Denies any chest pain, cough, shortness of breath, nausea or vomiting.  X-ray KUB done today shows concerns for ileus.  Has been NPO.  Colon biopsy positive for colon adenocarcinoma with signet ring features.  Repeat biopsies from ex lap have been pending      History of Present Illness:      The patient is a 44 y.o. male with no significant past medical history presented initially to [facility] on 8/7 with chief complaints of of abdominal pain.  CT abdomen done at that time showed segmental circumferential wall thickening with the rib enhancing fluid collection measuring 5.8 cm x 2.2 x 3.2 cm concerning for colitis/proctitis.  Also had enlarged retroperitoneal lymph nodes measuring 2.2 x 1.9 cm.  GI evaluated the patient, patient underwent colonoscopy, he was found to have circumferential mucosal abnormality and the scope could not be passed above 20 cm from the anus.  Biopsies were obtained.  And he was discharged home on Levaquin and Flagyl.  His biopsy results came back positive for colonic adenocarcinoma with signet ring features.  Patient came in today for elective abdominal exploration with general surgery.  Underwent flexible sigmoidoscopy, exploratory laparotomy, creation of end colostomy and resection of multiple peritoneal masses.    Hematology oncology consulted for further management.    Past Medical History:   has a past medical history of

## 2024-09-22 NOTE — PLAN OF CARE
Problem: Discharge Planning  Goal: Discharge to home or other facility with appropriate resources  9/22/2024 1542 by Maria M Javier RN  Outcome: Progressing  9/22/2024 0320 by Azalea Lindsay RN  Outcome: Progressing     Problem: Pain  Goal: Verbalizes/displays adequate comfort level or baseline comfort level  9/22/2024 1542 by Maria M Javier RN  Outcome: Progressing  9/22/2024 0320 by Azalea Lindsay RN  Outcome: Progressing     Problem: Safety - Adult  Goal: Free from fall injury  9/22/2024 1542 by Maria M Javier RN  Outcome: Progressing  9/22/2024 0320 by Azalea Lindsay RN  Outcome: Progressing  Flowsheets (Taken 9/21/2024 2049)  Free From Fall Injury: Instruct family/caregiver on patient safety     Problem: Nutrition Deficit:  Goal: Optimize nutritional status  9/22/2024 1542 by Maria M Javier RN  Outcome: Progressing  9/22/2024 0320 by Azalea Lindsay RN  Outcome: Progressing     Problem: ABCDS Injury Assessment  Goal: Absence of physical injury  9/22/2024 1542 by Maria M Javier RN  Outcome: Progressing  9/22/2024 0320 by Azalea Lindsay RN  Outcome: Progressing

## 2024-09-22 NOTE — PROGRESS NOTES
Occupational Therapy  Facility/Department: [address] MED SURG   Occupational Therapy Initial Evaluation    Patient Name: Yuri Phillips        MRN: [id number]    : 1980    Date of Service: 2024    Copied from Hem/Onc The patient is a 44 y.o. male with no significant past medical history presented initially to [facility] on  with chief complaints of of abdominal pain.  CT abdomen done at that time showed segmental circumferential wall thickening with the rib enhancing fluid collection measuring 5.8 cm x 2.2 x 3.2 cm concerning for colitis/proctitis.  Also had enlarged retroperitoneal lymph nodes measuring 2.2 x 1.9 cm.  GI evaluated the patient, patient underwent colonoscopy, he was found to have circumferential mucosal abnormality and the scope could not be passed above 20 cm from the anus.  Biopsies were obtained.  And he was discharged home on Levaquin and Flagyl.  His biopsy results came back positive for colonic adenocarcinoma with signet ring features.  Patient came in today for elective abdominal exploration with general surgery.  Underwent flexible sigmoidoscopy, exploratory laparotomy, creation of end colostomy and resection of multiple peritoneal masses.     Hematology oncology consulted for further management.    Discharge Recommendations   No therapy recommended at discharge.       OT Equipment Recommendations  Equipment Needed:  (CTA)    Assessment  Pt seated in chair upon entrance to room. Pt sat at edge of chair 20 minutes with good unsupported sitting balance with use of unilateral and bilateral UB support. Sit to stand with contact guard assistance. Pt completed dynamic mob in room with contact guard assistance. Please refer to below assist levels for adl completion. Pt ed on OT POC, safety awareness tech, proper hand placement for transfers, and energy conservation tech with proper breathing tech with good return. Pt ed on proper use of incentive spirometer. Pt completed 10 reps

## 2024-09-23 ENCOUNTER — TELEPHONE (OUTPATIENT)
Dept: ONCOLOGY | Age: 44
End: 2024-09-23

## 2024-09-23 DIAGNOSIS — C18.8 OVERLAPPING MALIGNANT NEOPLASM OF COLON (HCC): Primary | ICD-10-CM

## 2024-09-23 LAB
ANION GAP SERPL CALCULATED.3IONS-SCNC: 14 MMOL/L (ref 9–16)
BASOPHILS # BLD: <0.03 K/UL (ref 0–0.2)
BASOPHILS NFR BLD: 0 % (ref 0–2)
BUN SERPL-MCNC: 16 MG/DL (ref 6–20)
CALCIUM SERPL-MCNC: 8.5 MG/DL (ref 8.6–10.4)
CHLORIDE SERPL-SCNC: 102 MMOL/L (ref 98–107)
CO2 SERPL-SCNC: 22 MMOL/L (ref 20–31)
CREAT SERPL-MCNC: 0.9 MG/DL (ref 0.7–1.2)
EOSINOPHIL # BLD: 0.03 K/UL (ref 0–0.44)
EOSINOPHILS RELATIVE PERCENT: 0 % (ref 1–4)
ERYTHROCYTE [DISTWIDTH] IN BLOOD BY AUTOMATED COUNT: 15.3 % (ref 11.8–14.4)
GFR, ESTIMATED: >90 ML/MIN/1.73M2
GLUCOSE SERPL-MCNC: 100 MG/DL (ref 74–99)
HCT VFR BLD AUTO: 34.9 % (ref 40.7–50.3)
HGB BLD-MCNC: 11.2 G/DL (ref 13–17)
IMM GRANULOCYTES # BLD AUTO: 0.03 K/UL (ref 0–0.3)
IMM GRANULOCYTES NFR BLD: 0 %
LYMPHOCYTES NFR BLD: 1.38 K/UL (ref 1.1–3.7)
LYMPHOCYTES RELATIVE PERCENT: 13 % (ref 24–43)
MAGNESIUM SERPL-MCNC: 2 MG/DL (ref 1.6–2.6)
MCH RBC QN AUTO: 28 PG (ref 25.2–33.5)
MCHC RBC AUTO-ENTMCNC: 32.1 G/DL (ref 28.4–34.8)
MCV RBC AUTO: 87.3 FL (ref 82.6–102.9)
MONOCYTES NFR BLD: 0.86 K/UL (ref 0.1–1.2)
MONOCYTES NFR BLD: 8 % (ref 3–12)
NEUTROPHILS NFR BLD: 79 % (ref 36–65)
NEUTS SEG NFR BLD: 8.23 K/UL (ref 1.5–8.1)
NRBC BLD-RTO: 0 PER 100 WBC
PLATELET # BLD AUTO: 407 K/UL (ref 138–453)
PMV BLD AUTO: 9.1 FL (ref 8.1–13.5)
POTASSIUM SERPL-SCNC: 3.8 MMOL/L (ref 3.7–5.3)
RBC # BLD AUTO: 4 M/UL (ref 4.21–5.77)
RBC # BLD: ABNORMAL 10*6/UL
SODIUM SERPL-SCNC: 138 MMOL/L (ref 136–145)
WBC OTHER # BLD: 10.6 K/UL (ref 3.5–11.3)

## 2024-09-23 PROCEDURE — 99232 SBSQ HOSP IP/OBS MODERATE 35: CPT | Performed by: INTERNAL MEDICINE

## 2024-09-23 PROCEDURE — 36415 COLL VENOUS BLD VENIPUNCTURE: CPT

## 2024-09-23 PROCEDURE — 97530 THERAPEUTIC ACTIVITIES: CPT

## 2024-09-23 PROCEDURE — 80048 BASIC METABOLIC PNL TOTAL CA: CPT

## 2024-09-23 PROCEDURE — 2580000003 HC RX 258

## 2024-09-23 PROCEDURE — 85025 COMPLETE CBC W/AUTO DIFF WBC: CPT

## 2024-09-23 PROCEDURE — 6360000002 HC RX W HCPCS: Performed by: STUDENT IN AN ORGANIZED HEALTH CARE EDUCATION/TRAINING PROGRAM

## 2024-09-23 PROCEDURE — 83735 ASSAY OF MAGNESIUM: CPT

## 2024-09-23 PROCEDURE — 97161 PT EVAL LOW COMPLEX 20 MIN: CPT

## 2024-09-23 PROCEDURE — 6360000002 HC RX W HCPCS

## 2024-09-23 PROCEDURE — 2580000003 HC RX 258: Performed by: STUDENT IN AN ORGANIZED HEALTH CARE EDUCATION/TRAINING PROGRAM

## 2024-09-23 PROCEDURE — 1200000000 HC SEMI PRIVATE

## 2024-09-23 RX ADMIN — SODIUM CHLORIDE, POTASSIUM CHLORIDE, SODIUM LACTATE AND CALCIUM CHLORIDE: 600; 310; 30; 20 INJECTION, SOLUTION INTRAVENOUS at 04:42

## 2024-09-23 RX ADMIN — SODIUM CHLORIDE, PRESERVATIVE FREE 10 ML: 5 INJECTION INTRAVENOUS at 08:13

## 2024-09-23 RX ADMIN — ENOXAPARIN SODIUM 40 MG: 100 INJECTION SUBCUTANEOUS at 21:10

## 2024-09-23 RX ADMIN — ONDANSETRON 4 MG: 2 INJECTION INTRAMUSCULAR; INTRAVENOUS at 00:25

## 2024-09-23 RX ADMIN — KETOROLAC TROMETHAMINE 15 MG: 15 INJECTION, SOLUTION INTRAMUSCULAR; INTRAVENOUS at 00:25

## 2024-09-23 RX ADMIN — KETOROLAC TROMETHAMINE 15 MG: 15 INJECTION, SOLUTION INTRAMUSCULAR; INTRAVENOUS at 07:30

## 2024-09-23 RX ADMIN — ONDANSETRON 4 MG: 2 INJECTION INTRAMUSCULAR; INTRAVENOUS at 07:30

## 2024-09-23 RX ADMIN — SODIUM CHLORIDE, PRESERVATIVE FREE 40 MG: 5 INJECTION INTRAVENOUS at 08:12

## 2024-09-23 RX ADMIN — SODIUM CHLORIDE, POTASSIUM CHLORIDE, SODIUM LACTATE AND CALCIUM CHLORIDE: 600; 310; 30; 20 INJECTION, SOLUTION INTRAVENOUS at 14:31

## 2024-09-23 RX ADMIN — ENOXAPARIN SODIUM 40 MG: 100 INJECTION SUBCUTANEOUS at 08:13

## 2024-09-23 ASSESSMENT — PAIN DESCRIPTION - ORIENTATION: ORIENTATION: MID

## 2024-09-23 ASSESSMENT — PAIN SCALES - GENERAL
PAINLEVEL_OUTOF10: 0
PAINLEVEL_OUTOF10: 3
PAINLEVEL_OUTOF10: 3
PAINLEVEL_OUTOF10: 0
PAINLEVEL_OUTOF10: 0
PAINLEVEL_OUTOF10: 1
PAINLEVEL_OUTOF10: 0

## 2024-09-23 ASSESSMENT — PAIN DESCRIPTION - LOCATION: LOCATION: ABDOMEN

## 2024-09-23 ASSESSMENT — PAIN DESCRIPTION - DESCRIPTORS: DESCRIPTORS: DISCOMFORT

## 2024-09-23 NOTE — PROGRESS NOTES
Today's Date: 9/23/2024  Patient Name: Yuri Phillips  Date of admission: 9/20/2024  6:53 AM  Patient's age: 44 y.o., 1980  Admission Dx: Cancer of sigmoid (HCC) [C18.7]  S/P left colectomy [Z90.49]    Reason for Consult: management recommendations  Requesting Physician: Keshia Villar MD    Chief Complaint: Elective ex lap with low anterior resection and flexible sigmoidoscopy.    History Obtained From:  patient, electronic medical record    Interval history:    Patient seen and examined, sitting up in a chair.  Labs and vitals reviewed.  No acute overnight events  Labs stable  CT of the chest completed yesterday evening showing enlarged superior mediastinal lymph nodes to the left of the esophagus and borderline enlarged lymph nodes in the right cardiophrenic sulcus; concern for ileus/small bowel obstruction  Await final pathology from ex lap  Denies pain and nausea, only complaint is that he is hungry  Working with PT    History of Present Illness:      The patient is a 44 y.o. male with no significant past medical history presented initially to [facility] on 8/7 with chief complaints of of abdominal pain.  CT abdomen done at that time showed segmental circumferential wall thickening with the rib enhancing fluid collection measuring 5.8 cm x 2.2 x 3.2 cm concerning for colitis/proctitis.  Also had enlarged retroperitoneal lymph nodes measuring 2.2 x 1.9 cm.  GI evaluated the patient, patient underwent colonoscopy, he was found to have circumferential mucosal abnormality and the scope could not be passed above 20 cm from the anus.  Biopsies were obtained.  And he was discharged home on Levaquin and Flagyl.  His biopsy results came back positive for colonic adenocarcinoma with signet ring features.  Patient came in today for elective abdominal exploration with general surgery.  Underwent flexible sigmoidoscopy, exploratory laparotomy, creation of end colostomy and resection of multiple peritoneal  Problem  S/P left colectomy  Active Hospital Problems    Diagnosis Date Noted    S/P left colectomy [Z90.49] 09/20/2024         Assessment:  Colonic adenocarcinoma.    Unresectable colon cancer from rectum to the proximal  sigmoid colon, large 5 cm mass at the root of small bowel mesentery, multiple areas of peritoneal studding.  Status post exploratory laparotomy, resection of multiple peritoneal masses, creation of end colostomy 9/20/24.  Previous biopsy from colonoscopy 8/9/24 positive for colon adenocarcinoma with signet cells.  History of Crohn's disease.  Concern for ileus/small bowel obstruction    Plan:  I reviewed the labs/imaging available to me,outside records and discussed with the patient.I explained to the patient the nature of this problem. I explained the significance of these abnormalities and possible etiology and management options  CEA is elevated at 58.4  Pathology report from colonoscopy confirms adenocarcinoma with signet ring features, mismatch repair proficient disease  Biopsy of the peritoneal mass pending.  CT of the chest completed 9/22 shows enlarged superior mediastinal lymph nodes to the left of the esophagus and borderline enlarged lymph nodes in the right cardiophrenic sulcus; concern for ileus/small bowel obstruction  Symptomatic and supportive care.  He will need to follow-up with Dr. Sanchez upon discharge, appointment scheduled.  Outpatient PET CT scan will need to be completed, appointment scheduled.  Hematology oncology nurse navigator is following  Final recommendations to follow after discussing with attending physician, Dr. Ranjit Larry, CARLOS - CNP  [facility] Hematology/Oncology  9/23/2024, 9:14 AM       I have discussed the care of  this patient and I have personally examined the patient myselft and taken ros and hpi , including pertinent history and exam findings, labs, imaging , medication ad other relavent clincal data.  I have reviewed the key elements of all  parts of the encounter with the Nurse Practitioner .  I agree with the assessment, plan and orders as documented by the  NP with the following addendum     More than 50% of the time was spent taking care of this patient in addition to the nurse practitioner time.  That also included history taking follow-up physical examination and review of system.      Pain controlled.  Surgical path report pending.  Symptom and supportive care        Stephan Church M.D.          This note is created with the assistance of a speech recognition program.  While intending to generate a document that actually reflects the content of the visit, the document can still have some errors including those of syntax and sound a like substitutions which may escape proof reading.  It such instances, actual meaning can be extrapolated by contextual diversion.

## 2024-09-23 NOTE — PLAN OF CARE
Problem: Discharge Planning  Goal: Discharge to home or other facility with appropriate resources  9/23/2024 0424 by Katia Vale RN  Outcome: Progressing  9/22/2024 1542 by Maria M Javier RN  Outcome: Progressing     Problem: Pain  Goal: Verbalizes/displays adequate comfort level or baseline comfort level  9/23/2024 0424 by Katia Vale RN  Outcome: Progressing  9/22/2024 1542 by Maria M Javier RN  Outcome: Progressing     Problem: Safety - Adult  Goal: Free from fall injury  9/23/2024 0424 by Katia Vale RN  Outcome: Progressing  9/22/2024 1542 by Maria M Javier RN  Outcome: Progressing     Problem: Nutrition Deficit:  Goal: Optimize nutritional status  9/23/2024 0424 by Katia Vale RN  Outcome: Progressing  9/22/2024 1542 by Maria M Javier RN  Outcome: Progressing     Problem: ABCDS Injury Assessment  Goal: Absence of physical injury  9/23/2024 0424 by Katia Vale RN  Outcome: Progressing  9/22/2024 1542 by Maria M Javier RN  Outcome: Progressing

## 2024-09-23 NOTE — PLAN OF CARE
Problem: Pain  Goal: Verbalizes/displays adequate comfort level or baseline comfort level  9/23/2024 1444 by Erika Dhillon RN  Outcome: Progressing  9/23/2024 0424 by Katia Vale RN  Outcome: Progressing     Problem: Safety - Adult  Goal: Free from fall injury  9/23/2024 1444 by Erika Dhillon RN  Outcome: Progressing  9/23/2024 0424 by Katia Vale RN  Outcome: Progressing     Problem: Nutrition Deficit:  Goal: Optimize nutritional status  9/23/2024 1444 by Erika Dhillon RN  Outcome: Progressing  9/23/2024 0424 by Katia Vale RN  Outcome: Progressing     Problem: ABCDS Injury Assessment  Goal: Absence of physical injury  9/23/2024 1444 by Erika Dhillon RN  Outcome: Progressing  9/23/2024 0424 by Katia Vale RN  Outcome: Progressing     Problem: Physical Therapy - Adult  Goal: By Discharge: Performs mobility at highest level of function for planned discharge setting.  See evaluation for individualized goals.  9/23/2024 1117 by Mary Lou Mathew, PT  Outcome: Progressing     Problem: Discharge Planning  Goal: Discharge to home or other facility with appropriate resources  9/23/2024 1444 by Erika Dhillon RN  Outcome: Progressing  9/23/2024 0424 by Katia Vale RN  Outcome: Progressing

## 2024-09-23 NOTE — PROGRESS NOTES
Physical Therapy  Facility/Department: [address] MED SURG  Physical Therapy Initial Assessment    Name: Yuri Phillips  : 1980  MRN: [id number]  Date of Service: 2024    Discharge Recommendations:  No therapy recommended at discharge   PT Equipment Recommendations  Equipment Needed: No    Per chart-44 y.o. male with colonic mass, presents for exploratory laparotomy and planned low anterior resection. Intraoperative, the mass was unable to be resected due to size. Decision was made to proceed with end colostomy and biopsies of peritoneal lesions.       Patient Diagnosis(es): The encounter diagnosis was Cancer of sigmoid (HCC).  Past Medical History:  has a past medical history of Colitis, Colon cancer (HCC), GERD (gastroesophageal reflux disease), Migraines, Seizure (HCC), Under care of service provider, and Wears glasses.  Past Surgical History:  has a past surgical history that includes Colonoscopy (N/A, 2024); Abdominal exploration surgery (N/A, 2024); and Flexible sigmoidoscopy (N/A, 2024).    Assessment  Assessment: Pt able to ambulate 200 ft without device and SUP, no loss of balance. Pt negotiate 3 stairs with SUP, 1 rail. Pt indep for transfers without device. Pt at baseline for mobility and gait, no further therapy needs at this time.  Therapy Prognosis: Good  Decision Making: Low Complexity  Requires PT Follow-Up: No  Activity Tolerance  Activity Tolerance: Patient tolerated evaluation without incident    Plan  Physical Therapy Plan  General Plan: Discharge with evaluation only  Safety Devices  Type of Devices: Call light within reach, Left in chair  Restraints  Restraints Initially in Place: No    Restrictions  Restrictions/Precautions  Restrictions/Precautions: Up as Tolerated  Required Braces or Orthoses?: No  Position Activity Restriction  Other position/activity restrictions: ambulate pt., s/p Flexible sigmoidoscopy, exploratory laparotomy, creation of end colostomy, resection  of multiple peritoneal masses. Excise  of  umbilical mass 9/20/2024     Subjective  General  Chart Reviewed: Yes  Patient assessed for rehabilitation services?: Yes  Family / Caregiver Present: No  Follows Commands: Within Functional Limits  General Comment  Comments: RN and pt agreeable to treatment session  Subjective  Subjective: Pt reports no pain, numbness or tingling.         Social/Functional History  Social/Functional History  Lives With: Alone  Type of Home: House  Home Layout: One level  Home Access: Level entry  Bathroom Shower/Tub: Tub/Shower unit, Curtain  Bathroom Toilet: Standard  Bathroom Equipment: Hand-held shower, Grab bars around toilet  Home Equipment: None  Has the patient had two or more falls in the past year or any fall with injury in the past year?: No  Receives Help From: Family (brother resides 5 min away and works different shifts,)  ADL Assistance: Independent  Homemaking Assistance: Independent  Homemaking Responsibilities: Yes  Meal Prep Responsibility: Primary  Laundry Responsibility: Primary  Cleaning Responsibility: Primary  Bill Paying/Finance Responsibility: Primary  Shopping Responsibility: Primary  Ambulation Assistance: Independent  Transfer Assistance: Independent  Active : Yes  Mode of Transportation: Van  Occupation: Full time employment  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

## 2024-09-23 NOTE — PROGRESS NOTES
Colorectal Surgery:  Daily Progress Note          POD # 3 s/p exploratory laparotomy, creation of end colostomy, resection of multiple peritoneal mases          PATIENT NAME: Yuri Phillips     TODAY'S DATE: 9/23/2024, 6:02 AM  CC:  emesis    SUBJECTIVE:     Pt seen and examined at bedside. Had bilious emesis overnight. No nausea this morning. Is burping. There is a small amount of stool in his ostomy appliance. Pain is controlled. He is ambulating independently.    OBJECTIVE:   VITALS:  BP (!) 141/84   Pulse 72   Temp 98.8 °F (37.1 °C) (Oral)   Resp 20   Ht 1.702 m (5' 7.01\")   Wt 95.3 kg (210 lb)   SpO2 96%   BMI 32.88 kg/m²      INTAKE/OUTPUT:    No intake or output data in the 24 hours ending 09/23/24 0602      PHYSICAL EXAM:  General Appearance: awake, alert, oriented, in no acute distress  HEENT:  Normocephalic, atraumatic, mucus membranes moist   Lungs: symmetric chest wall expansion, no accessory muscle use  Abdomen:soft, distended, tender to palpation, surgical dressing with strike through, left sided colostomy is pink with small amount of stool in appliance  Extremities: No cyanosis, pitting edema, rashes noted.    Skin: Skin color, texture, turgor normal. No rashes or lesions.      Data:  CBC with Differential:    Lab Results   Component Value Date/Time    WBC 11.8 09/22/2024 04:47 AM    RBC 4.18 09/22/2024 04:47 AM    HGB 11.5 09/22/2024 04:47 AM    HCT 36.2 09/22/2024 04:47 AM     09/22/2024 04:47 AM    MCV 86.6 09/22/2024 04:47 AM    MCH 27.5 09/22/2024 04:47 AM    MCHC 31.8 09/22/2024 04:47 AM    RDW 15.3 09/22/2024 04:47 AM    LYMPHOPCT 10 09/22/2024 04:47 AM    MONOPCT 9 09/22/2024 04:47 AM    EOSPCT 0 09/22/2024 04:47 AM    BASOPCT 0 09/22/2024 04:47 AM    MONOSABS 1.05 09/22/2024 04:47 AM    LYMPHSABS 1.16 09/22/2024 04:47 AM    EOSABS <0.03 09/22/2024 04:47 AM    BASOSABS <0.03 09/22/2024 04:47 AM     BMP:    Lab Results   Component Value Date/Time     09/22/2024 04:47 AM    K  4.3 09/22/2024 04:47 AM     09/22/2024 04:47 AM    CO2 25 09/22/2024 04:47 AM    BUN 10 09/22/2024 04:47 AM    CREATININE 0.9 09/22/2024 04:47 AM    CALCIUM 9.2 09/22/2024 04:47 AM    LABGLOM >90 09/22/2024 04:47 AM    GLUCOSE 113 09/22/2024 04:47 AM       Radiology Review:    No results found.      ASSESSMENT:  Active Hospital Problems    Diagnosis Date Noted    S/P left colectomy [Z90.49] 09/20/2024       44 y.o. male with colonic mass, presents for exploratory laparotomy and planned low anterior resection. Intraoperative, the mass was unable to be resected due to size. Decision was made to proceed with end colostomy and biopsies of peritoneal lesions.    Plan:  KUB 9/22 shows ileus  Diet: NPO, sips of clear liquids and ice chips. No carbonation.  MMPT  Lovenox for DVT ppx  Heme/onc consulted, plan for outpatient follow up  CT chest shows enlarged mediastinal LN, no other signs of metastatic disease in the chest  Wound care: nursing to change midline dressing  PT/OT  Ambulate TID    Electronically signed by Madai Orr DO  on 9/23/2024 at 6:02 AM   I Dr. Villar saw and examined the patient. I have edited the above and agree with the above.     Keshia Villar  Colorectal Surgery

## 2024-09-24 LAB
ANION GAP SERPL CALCULATED.3IONS-SCNC: 14 MMOL/L (ref 9–16)
BASOPHILS # BLD: <0.03 K/UL (ref 0–0.2)
BASOPHILS NFR BLD: 0 % (ref 0–2)
BUN SERPL-MCNC: 16 MG/DL (ref 6–20)
CALCIUM SERPL-MCNC: 8.7 MG/DL (ref 8.6–10.4)
CHLORIDE SERPL-SCNC: 102 MMOL/L (ref 98–107)
CO2 SERPL-SCNC: 23 MMOL/L (ref 20–31)
CREAT SERPL-MCNC: 0.9 MG/DL (ref 0.7–1.2)
EOSINOPHIL # BLD: 0.1 K/UL (ref 0–0.44)
EOSINOPHILS RELATIVE PERCENT: 1 % (ref 1–4)
ERYTHROCYTE [DISTWIDTH] IN BLOOD BY AUTOMATED COUNT: 15.1 % (ref 11.8–14.4)
GFR, ESTIMATED: >90 ML/MIN/1.73M2
GLUCOSE SERPL-MCNC: 91 MG/DL (ref 74–99)
HCT VFR BLD AUTO: 35.8 % (ref 40.7–50.3)
HGB BLD-MCNC: 11.5 G/DL (ref 13–17)
IMM GRANULOCYTES # BLD AUTO: <0.03 K/UL (ref 0–0.3)
IMM GRANULOCYTES NFR BLD: 0 %
LYMPHOCYTES NFR BLD: 1.54 K/UL (ref 1.1–3.7)
LYMPHOCYTES RELATIVE PERCENT: 19 % (ref 24–43)
MAGNESIUM SERPL-MCNC: 1.8 MG/DL (ref 1.6–2.6)
MCH RBC QN AUTO: 28.4 PG (ref 25.2–33.5)
MCHC RBC AUTO-ENTMCNC: 32.1 G/DL (ref 28.4–34.8)
MCV RBC AUTO: 88.4 FL (ref 82.6–102.9)
MONOCYTES NFR BLD: 0.65 K/UL (ref 0.1–1.2)
MONOCYTES NFR BLD: 8 % (ref 3–12)
NEUTROPHILS NFR BLD: 72 % (ref 36–65)
NEUTS SEG NFR BLD: 5.97 K/UL (ref 1.5–8.1)
NRBC BLD-RTO: 0 PER 100 WBC
PLATELET # BLD AUTO: ABNORMAL K/UL (ref 138–453)
PLATELET, FLUORESCENCE: ABNORMAL K/UL (ref 138–453)
POTASSIUM SERPL-SCNC: 4 MMOL/L (ref 3.7–5.3)
RBC # BLD AUTO: 4.05 M/UL (ref 4.21–5.77)
RBC # BLD: ABNORMAL 10*6/UL
SODIUM SERPL-SCNC: 139 MMOL/L (ref 136–145)
WBC OTHER # BLD: 8.3 K/UL (ref 3.5–11.3)

## 2024-09-24 PROCEDURE — 6370000000 HC RX 637 (ALT 250 FOR IP)

## 2024-09-24 PROCEDURE — 85055 RETICULATED PLATELET ASSAY: CPT

## 2024-09-24 PROCEDURE — 99232 SBSQ HOSP IP/OBS MODERATE 35: CPT | Performed by: INTERNAL MEDICINE

## 2024-09-24 PROCEDURE — 2580000003 HC RX 258

## 2024-09-24 PROCEDURE — 99213 OFFICE O/P EST LOW 20 MIN: CPT

## 2024-09-24 PROCEDURE — 80048 BASIC METABOLIC PNL TOTAL CA: CPT

## 2024-09-24 PROCEDURE — 85025 COMPLETE CBC W/AUTO DIFF WBC: CPT

## 2024-09-24 PROCEDURE — 36415 COLL VENOUS BLD VENIPUNCTURE: CPT

## 2024-09-24 PROCEDURE — 6360000002 HC RX W HCPCS: Performed by: STUDENT IN AN ORGANIZED HEALTH CARE EDUCATION/TRAINING PROGRAM

## 2024-09-24 PROCEDURE — 2580000003 HC RX 258: Performed by: STUDENT IN AN ORGANIZED HEALTH CARE EDUCATION/TRAINING PROGRAM

## 2024-09-24 PROCEDURE — 6360000002 HC RX W HCPCS

## 2024-09-24 PROCEDURE — 1200000000 HC SEMI PRIVATE

## 2024-09-24 PROCEDURE — 83735 ASSAY OF MAGNESIUM: CPT

## 2024-09-24 RX ORDER — CALCIUM CARBONATE 500 MG/1
500 TABLET, CHEWABLE ORAL 3 TIMES DAILY PRN
Status: DISCONTINUED | OUTPATIENT
Start: 2024-09-24 | End: 2024-09-25 | Stop reason: HOSPADM

## 2024-09-24 RX ADMIN — SODIUM CHLORIDE, POTASSIUM CHLORIDE, SODIUM LACTATE AND CALCIUM CHLORIDE: 600; 310; 30; 20 INJECTION, SOLUTION INTRAVENOUS at 17:16

## 2024-09-24 RX ADMIN — SODIUM CHLORIDE, POTASSIUM CHLORIDE, SODIUM LACTATE AND CALCIUM CHLORIDE: 600; 310; 30; 20 INJECTION, SOLUTION INTRAVENOUS at 00:29

## 2024-09-24 RX ADMIN — SODIUM CHLORIDE, PRESERVATIVE FREE 40 MG: 5 INJECTION INTRAVENOUS at 08:43

## 2024-09-24 RX ADMIN — KETOROLAC TROMETHAMINE 15 MG: 15 INJECTION, SOLUTION INTRAMUSCULAR; INTRAVENOUS at 23:54

## 2024-09-24 RX ADMIN — KETOROLAC TROMETHAMINE 15 MG: 15 INJECTION, SOLUTION INTRAMUSCULAR; INTRAVENOUS at 17:18

## 2024-09-24 RX ADMIN — SODIUM CHLORIDE, POTASSIUM CHLORIDE, SODIUM LACTATE AND CALCIUM CHLORIDE: 600; 310; 30; 20 INJECTION, SOLUTION INTRAVENOUS at 08:43

## 2024-09-24 RX ADMIN — SODIUM CHLORIDE, PRESERVATIVE FREE 10 ML: 5 INJECTION INTRAVENOUS at 08:43

## 2024-09-24 RX ADMIN — ENOXAPARIN SODIUM 40 MG: 100 INJECTION SUBCUTANEOUS at 08:43

## 2024-09-24 RX ADMIN — KETOROLAC TROMETHAMINE 15 MG: 15 INJECTION, SOLUTION INTRAMUSCULAR; INTRAVENOUS at 10:29

## 2024-09-24 RX ADMIN — SODIUM CHLORIDE, PRESERVATIVE FREE 10 ML: 5 INJECTION INTRAVENOUS at 19:57

## 2024-09-24 RX ADMIN — ANTACID TABLETS 500 MG: 500 TABLET, CHEWABLE ORAL at 21:09

## 2024-09-24 RX ADMIN — ENOXAPARIN SODIUM 40 MG: 100 INJECTION SUBCUTANEOUS at 20:00

## 2024-09-24 ASSESSMENT — PAIN SCALES - GENERAL
PAINLEVEL_OUTOF10: 0
PAINLEVEL_OUTOF10: 2
PAINLEVEL_OUTOF10: 3
PAINLEVEL_OUTOF10: 0
PAINLEVEL_OUTOF10: 3

## 2024-09-24 ASSESSMENT — PAIN DESCRIPTION - DESCRIPTORS
DESCRIPTORS: ACHING
DESCRIPTORS: SORE

## 2024-09-24 ASSESSMENT — PAIN DESCRIPTION - LOCATION
LOCATION: ABDOMEN
LOCATION: ABDOMEN

## 2024-09-24 ASSESSMENT — PAIN DESCRIPTION - ORIENTATION: ORIENTATION: MID

## 2024-09-24 ASSESSMENT — PAIN DESCRIPTION - PAIN TYPE: TYPE: SURGICAL PAIN

## 2024-09-24 ASSESSMENT — PAIN DESCRIPTION - ONSET: ONSET: ON-GOING

## 2024-09-24 ASSESSMENT — PAIN DESCRIPTION - FREQUENCY: FREQUENCY: CONTINUOUS

## 2024-09-24 ASSESSMENT — PAIN - FUNCTIONAL ASSESSMENT: PAIN_FUNCTIONAL_ASSESSMENT: ACTIVITIES ARE NOT PREVENTED

## 2024-09-24 NOTE — PROGRESS NOTES
Today's Date: 9/24/2024  Patient Name: Yuri Phillips  Date of admission: 9/20/2024  6:53 AM  Patient's age: 44 y.o., 1980  Admission Dx: Cancer of sigmoid (HCC) [C18.7]  S/P left colectomy [Z90.49]    Reason for Consult: management recommendations  Requesting Physician: Keshia Villar MD    Chief Complaint: Elective ex lap with low anterior resection and flexible sigmoidoscopy.    History Obtained From:  patient, electronic medical record    Interval history:    Patient seen and examined at bedside.  No acute events overnight.  Labs unremarkable.  Denies any complaints today.  CT chest shows enlarged superior mediastinal lymph node located to the left of the esophagus and borderline enlarged lymph nodes in the right cardiophrenic sulcus.  Plan for PET scan as outpatient.  Patient tolerating full liquid diet    History of Present Illness:      The patient is a 44 y.o. male with no significant past medical history presented initially to [facility] on 8/7 with chief complaints of of abdominal pain.  CT abdomen done at that time showed segmental circumferential wall thickening with the rib enhancing fluid collection measuring 5.8 cm x 2.2 x 3.2 cm concerning for colitis/proctitis.  Also had enlarged retroperitoneal lymph nodes measuring 2.2 x 1.9 cm.  GI evaluated the patient, patient underwent colonoscopy, he was found to have circumferential mucosal abnormality and the scope could not be passed above 20 cm from the anus.  Biopsies were obtained.  And he was discharged home on Levaquin and Flagyl.  His biopsy results came back positive for colonic adenocarcinoma with signet ring features.  Patient came in today for elective abdominal exploration with general surgery.  Underwent flexible sigmoidoscopy, exploratory laparotomy, creation of end colostomy and resection of multiple peritoneal masses.    Hematology oncology consulted for further management.    Past Medical History:   has a past medical

## 2024-09-24 NOTE — PLAN OF CARE
Problem: Discharge Planning  Goal: Discharge to home or other facility with appropriate resources  9/23/2024 2319 by Maryan Cardenas, RN  Outcome: Progressing     Problem: Pain  Goal: Verbalizes/displays adequate comfort level or baseline comfort level  9/23/2024 2319 by Maryan Cardenas, RN  Outcome: Progressing     Problem: Safety - Adult  Goal: Free from fall injury  9/23/2024 2319 by Maryan Cardenas, RN  Outcome: Progressing     Problem: Nutrition Deficit:  Goal: Optimize nutritional status  9/23/2024 2319 by Maryan Cardenas, RN  Outcome: Progressing     Problem: ABCDS Injury Assessment  Goal: Absence of physical injury  9/23/2024 2319 by Maryan Cardenas, RN  Outcome: Progressing

## 2024-09-24 NOTE — PROGRESS NOTES
Nutrition Assessment     Type and Reason for Visit: Reassess    Nutrition Recommendations/Plan:   Continue to advance diet as tolerated by pt  Monitor PO intake, diet advancement, wt, labs, meds, BM, ostomy output     Malnutrition Assessment:  Malnutrition Status: Moderate malnutrition    Nutrition Assessment:  Tolerated CLD well and advanced to full liquids today. MARYSOL full liquid PO intake as he had not received full iquids. Having ostomy output, no edema. LBM documented 9/23.    Estimated Daily Nutrient Needs:  Energy (kcal):  7191-9036 kcals/day Weight Used for Energy Requirements: Admission     Protein (g):  100-130 gm pro/day Weight Used for Protein Requirements: Ideal        Fluid (ml/day):  4077-3126 mL/day or per MD Method Used for Fluid Requirements: 1 ml/kcal    Nutrition Related Findings:   Hypoactive bowel sounds.  +Colostomy. Wound Type: Surgical Incision (to abdomen)    Current Nutrition Therapies:    ADULT DIET; Full Liquid    Anthropometric Measures:  Height: 170.2 cm (5' 7.01\")  Current Body Wt: 95.3 kg (210 lb 1.6 oz)   BMI: 32.9    Nutrition Diagnosis:   Inadequate oral intake related to catabolic illness, altered GI function (recent GI surgery) as evidenced by poor intake prior to admission, weight loss    Nutrition Interventions:   Food and/or Nutrient Delivery: Continue Current Diet  Nutrition Education/Counseling: No recommendation at this time  Coordination of Nutrition Care: Continue to monitor while inpatient    Goals:  Previous Goal Met: Progressing toward Goal(s)  Goals: Meet at least 75% of estimated needs, prior to discharge    Nutrition Monitoring and Evaluation:   Behavioral-Environmental Outcomes: None Identified  Food/Nutrient Intake Outcomes: Diet Advancement/Tolerance  Physical Signs/Symptoms Outcomes: Biochemical Data, GI Status, Fluid Status or Edema, Hemodynamic Status, Weight, Skin, Nutrition Focused Physical Findings    Discharge Planning:    Too soon to determine     Carol

## 2024-09-24 NOTE — CARE COORDINATION
Transitional Planning  Spoke with pt about home care as I feel he would benefit from Home care. He currently does not have a PCP he has a list.  Informed him to call and get a PCP appt asap as he will need PCP for home care.                       Post Acute Facility/Agency List     Provided patient with the following list, the list includes the overall star ratings obtained from CMS per the Medicare Web site (www.Medicare.gov):     [] Long Term Acute Care Facilities  [] Acute Inpatient Rehabilitation Facilities  [] Skilled Nursing Facilities  [] Hospice Facilities  [x] Home Care    Provided verbal instructions on how to utilize the QR Code to obtain additional detailed star ratings from www.Medicare.gov     offered to print and provide the detailed list:    []Accepted   [x]Declined    The following printed detailed lists were provided:     He agreed to Home Care and made the following choices  [facility]  [facility]  [facility]  Referrals sent to all  Per Prachi Lujan wound ostomy RN she will prepare a supply order for [employer] that Dr Villar will need to sign

## 2024-09-24 NOTE — PROGRESS NOTES
Consulted for End Colostomy Care and Teaching          History:   9/20:  Flexible sigmoidoscopy, exploratory laparotomy, creation of end colostomy, resection of multiple peritoneal masses with Dr. Villar for cancer of Sigmoid colon.    Written material provided and reviewed.  Discussed ostomy care including frequency of emptying and pouch changes. Advised to avoid using baby wipes on peristomal skin, use warm water washcloth  or gentle soap in the shower.   Demonstrated the tail closure of the drainable pouch and discussed option of using a closed ended appliance in the future if BM are regular at 1 -2 per day.    Demonstrated pouch change, and need to re-measure the stoma periodically for change in size/shape over next several weeks.    Currently the abdominal plateau is round and firm; suggested using a flat skin barrier .    OSTOMY ASSESSMENT:     09/24/24 1233   Colostomy LUQ   Placement Date/Time: 09/20/24 0951   Present on Admission/Arrival: No  Location: LUQ   Stomal Appliance Changed;1 piece   Stoma  Assessment Red;Moist;Protrudes;Edema  (35 mm sl oval)   Peristomal Assessment Intact   Mucocutaneous Junction Intact   Treatment Pouch change;Site care;Liquid skin barrier   Stool Appearance Soft   Stool Color Brown   Output (mL) 100 ml         Equipment used    [employer] flat CTF one-piece appliance #[id number]  SurePREP barrier film   Adhesive remover  Brava lubricating deodorant        Plan of care:   Reports he is discharging to home possibly tomorrow.  He lives alone but says his sister will be here Thursday to help.  Pouch positioned vertically in order for hime to practive emptying into the toilet.    Will return tomorrow before discharge to assist him in a pouch application.  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  Will prepare a supply order for [employer];  will need to sign.

## 2024-09-24 NOTE — PROGRESS NOTES
Colorectal Surgery:  Daily Progress Note          POD # 4 s/p exploratory laparotomy, creation of end colostomy, resection of multiple peritoneal mases          PATIENT NAME: Yuri Phillips     TODAY'S DATE: 9/24/2024, 6:28 AM  CC:  burping    SUBJECTIVE:     Pt seen and examined at bedside. No acute events overnight. Afebrile, vital signs within normal limits on room air. Having bowel movements via ostomy. No nausea or vomiting.    OBJECTIVE:   VITALS:  BP (!) 140/88 Comment: with in order parameters  Pulse 61   Temp 99.5 °F (37.5 °C) (Oral)   Resp 18   Ht 1.702 m (5' 7.01\")   Wt 95.3 kg (210 lb)   SpO2 98%   BMI 32.88 kg/m²      INTAKE/OUTPUT:    No intake or output data in the 24 hours ending 09/24/24 0628      PHYSICAL EXAM:  General Appearance: awake, alert, oriented, in no acute distress  HEENT:  Normocephalic, atraumatic, mucus membranes moist   Lungs: symmetric chest wall expansion, no accessory muscle use  Abdomen:soft, distended, tender to palpation, surgical dressing with strike through, left sided colostomy is pink with formed stool in appliance  Extremities: No cyanosis, pitting edema, rashes noted.    Skin: Skin color, texture, turgor normal. No rashes or lesions.      Data:  CBC with Differential:    Lab Results   Component Value Date/Time    WBC 10.6 09/23/2024 08:32 AM    RBC 4.00 09/23/2024 08:32 AM    HGB 11.2 09/23/2024 08:32 AM    HCT 34.9 09/23/2024 08:32 AM     09/23/2024 08:32 AM    MCV 87.3 09/23/2024 08:32 AM    MCH 28.0 09/23/2024 08:32 AM    MCHC 32.1 09/23/2024 08:32 AM    RDW 15.3 09/23/2024 08:32 AM    LYMPHOPCT 13 09/23/2024 08:32 AM    MONOPCT 8 09/23/2024 08:32 AM    EOSPCT 0 09/23/2024 08:32 AM    BASOPCT 0 09/23/2024 08:32 AM    MONOSABS 0.86 09/23/2024 08:32 AM    LYMPHSABS 1.38 09/23/2024 08:32 AM    EOSABS 0.03 09/23/2024 08:32 AM    BASOSABS <0.03 09/23/2024 08:32 AM     BMP:    Lab Results   Component Value Date/Time     09/23/2024 08:32 AM    K 3.8

## 2024-09-24 NOTE — PLAN OF CARE
Problem: Pain  Goal: Verbalizes/displays adequate comfort level or baseline comfort level  Outcome: Progressing   Pain scale preformed per protocol and pt treated for pain as documented. Education given.     Problem: Safety - Adult  Goal: Free from fall injury  Outcome: Progressing   Fall assessment preformed. Bed in low locked position with call light and tray table within reach. Education given. Will continue to monitor.    Problem: Nutrition Deficit:  Goal: Optimize nutritional status  Outcome: Progressing   Advance as lele  Problem: ABCDS Injury Assessment  Goal: Absence of physical injury  Outcome: Progressing   Coughing deep breathing encouraged. supplemental O2 used as ordered. Respiratory therapist at bedside for education.

## 2024-09-25 VITALS
RESPIRATION RATE: 18 BRPM | DIASTOLIC BLOOD PRESSURE: 87 MMHG | HEIGHT: 67 IN | OXYGEN SATURATION: 97 % | SYSTOLIC BLOOD PRESSURE: 134 MMHG | TEMPERATURE: 98.4 F | BODY MASS INDEX: 32.96 KG/M2 | WEIGHT: 210 LBS | HEART RATE: 55 BPM

## 2024-09-25 LAB
ANION GAP SERPL CALCULATED.3IONS-SCNC: 13 MMOL/L (ref 9–16)
BASOPHILS # BLD: <0.03 K/UL (ref 0–0.2)
BASOPHILS NFR BLD: 0 % (ref 0–2)
BUN SERPL-MCNC: 13 MG/DL (ref 6–20)
CALCIUM SERPL-MCNC: 8.7 MG/DL (ref 8.6–10.4)
CHLORIDE SERPL-SCNC: 102 MMOL/L (ref 98–107)
CO2 SERPL-SCNC: 24 MMOL/L (ref 20–31)
CREAT SERPL-MCNC: 0.8 MG/DL (ref 0.7–1.2)
EOSINOPHIL # BLD: 0.08 K/UL (ref 0–0.44)
EOSINOPHILS RELATIVE PERCENT: 1 % (ref 1–4)
ERYTHROCYTE [DISTWIDTH] IN BLOOD BY AUTOMATED COUNT: 14.8 % (ref 11.8–14.4)
GFR, ESTIMATED: >90 ML/MIN/1.73M2
GLUCOSE SERPL-MCNC: 112 MG/DL (ref 74–99)
HCT VFR BLD AUTO: 35.1 % (ref 40.7–50.3)
HGB BLD-MCNC: 11.3 G/DL (ref 13–17)
IMM GRANULOCYTES # BLD AUTO: <0.03 K/UL (ref 0–0.3)
IMM GRANULOCYTES NFR BLD: 0 %
LYMPHOCYTES NFR BLD: 1.36 K/UL (ref 1.1–3.7)
LYMPHOCYTES RELATIVE PERCENT: 19 % (ref 24–43)
MAGNESIUM SERPL-MCNC: 1.7 MG/DL (ref 1.6–2.6)
MCH RBC QN AUTO: 28.4 PG (ref 25.2–33.5)
MCHC RBC AUTO-ENTMCNC: 32.2 G/DL (ref 28.4–34.8)
MCV RBC AUTO: 88.2 FL (ref 82.6–102.9)
MONOCYTES NFR BLD: 0.74 K/UL (ref 0.1–1.2)
MONOCYTES NFR BLD: 11 % (ref 3–12)
NEUTROPHILS NFR BLD: 69 % (ref 36–65)
NEUTS SEG NFR BLD: 4.84 K/UL (ref 1.5–8.1)
NRBC BLD-RTO: 0 PER 100 WBC
PLATELET # BLD AUTO: 431 K/UL (ref 138–453)
PMV BLD AUTO: 9.2 FL (ref 8.1–13.5)
POTASSIUM SERPL-SCNC: 3.7 MMOL/L (ref 3.7–5.3)
RBC # BLD AUTO: 3.98 M/UL (ref 4.21–5.77)
RBC # BLD: ABNORMAL 10*6/UL
SODIUM SERPL-SCNC: 139 MMOL/L (ref 136–145)
SURGICAL PATHOLOGY REPORT: NORMAL
WBC OTHER # BLD: 7.1 K/UL (ref 3.5–11.3)

## 2024-09-25 PROCEDURE — 99232 SBSQ HOSP IP/OBS MODERATE 35: CPT | Performed by: INTERNAL MEDICINE

## 2024-09-25 PROCEDURE — 36415 COLL VENOUS BLD VENIPUNCTURE: CPT

## 2024-09-25 PROCEDURE — 83735 ASSAY OF MAGNESIUM: CPT

## 2024-09-25 PROCEDURE — 99212 OFFICE O/P EST SF 10 MIN: CPT

## 2024-09-25 PROCEDURE — 85025 COMPLETE CBC W/AUTO DIFF WBC: CPT

## 2024-09-25 PROCEDURE — 80048 BASIC METABOLIC PNL TOTAL CA: CPT

## 2024-09-25 PROCEDURE — 2580000003 HC RX 258: Performed by: STUDENT IN AN ORGANIZED HEALTH CARE EDUCATION/TRAINING PROGRAM

## 2024-09-25 RX ADMIN — SODIUM CHLORIDE, PRESERVATIVE FREE 10 ML: 5 INJECTION INTRAVENOUS at 08:07

## 2024-09-25 RX ADMIN — SODIUM CHLORIDE, POTASSIUM CHLORIDE, SODIUM LACTATE AND CALCIUM CHLORIDE: 600; 310; 30; 20 INJECTION, SOLUTION INTRAVENOUS at 02:14

## 2024-09-25 ASSESSMENT — PAIN SCALES - GENERAL
PAINLEVEL_OUTOF10: 0
PAINLEVEL_OUTOF10: 0

## 2024-09-25 NOTE — PLAN OF CARE
Problem: Discharge Planning  Goal: Discharge to home or other facility with appropriate resources  9/24/2024 2134 by Arya Guzmán RN  Outcome: Progressing  9/24/2024 1640 by Elli Mazariegos RN  Outcome: Progressing  Flowsheets (Taken 9/24/2024 0800)  Discharge to home or other facility with appropriate resources: Identify barriers to discharge with patient and caregiver     Problem: Pain  Goal: Verbalizes/displays adequate comfort level or baseline comfort level  9/24/2024 2134 by Arya Guzmán RN  Outcome: Progressing  9/24/2024 1640 by Elli Mazariegos RN  Outcome: Progressing     Problem: Safety - Adult  Goal: Free from fall injury  9/24/2024 2134 by Arya Guzmán RN  Outcome: Progressing  9/24/2024 1640 by Elli Mazariegos RN  Outcome: Progressing     Problem: Nutrition Deficit:  Goal: Optimize nutritional status  9/24/2024 2134 by Arya Guzmán RN  Outcome: Progressing  9/24/2024 1640 by Elli Mazariegos RN  Outcome: Progressing     Problem: ABCDS Injury Assessment  Goal: Absence of physical injury  9/24/2024 2134 by Arya Guzmán RN  Outcome: Progressing  9/24/2024 1640 by Elli Mazariegos RN  Outcome: Progressing

## 2024-09-25 NOTE — DISCHARGE INSTRUCTIONS
Recommended colostomy supplies:  Coloplast CELY,  flat MAXI,  CTF drainable  pouch  #[id number]  SafeNSimple Skin barrier wipe #[id number]  SafeNSimple  Adhesive Remover wipe #[id number]  Adapt Odor Eliminator Lubricant  #[id number]    [employer] was chosen as Supplier  Order form was sent to 's office for signature for above items; may take a few days to process.  If you have not heard from [employer] by Friday October 4, call them  [phone]

## 2024-09-25 NOTE — CARE COORDINATION
Transitional planning: Phone call from Prachi with [facility]. They are declining due to no staff available until next week.    0830 Phone call from Prachi with [facility]. They are OON.    1241 VM from Gustavo with [facility]. They are declining. No reason provided.    1540 PS message to Madai Orr, DO asking if Dr. Villar will follow for [facility] until established with PCP.  1542 Read and answered in the affirmative.    1550 Phone call to Leatha with [facility]. They do not take insurance.    1605 Spoke to patient to notify of the above. Patient is agreeable to writer sending referrals to agencies more likely to take insurance. Referrals to [facility] and [facility].

## 2024-09-25 NOTE — PROGRESS NOTES
Consulted for Colostomy Care and Teaching          History: 9/20:  Flexible sigmoidoscopy, exploratory laparotomy, creation of end colostomy, resection of multiple peritoneal masses with Dr. Villar for cancer of Sigmoid colon.         OSTOMY ASSESSMENT:     09/25/24 1030   Colostomy LUQ   Placement Date/Time: 09/20/24 0951   Present on Admission/Arrival: No  Location: LUQ   Stomal Appliance 1 piece;Flat;Intact   Stool Appearance Loose   Stool Color Brown   Output (mL) 200 ml     Plan of care:  To practice self care procedures.    Assisted to empty the pouch into the toilet.  Sitting on the toilet to empty between knees was not do-able due to body habitus.  Demonstrated use of TP in the bowl to prevent splashing while emptying standing next to bowl.  Aversion to odor and handling of the pouch yet to be overcome.  Patient asked for gloves.  Demonstrated using odor antagonist spray before and after opening the pouch.  Demonstrated how to empty, cleanse tail closure and use of lubricating deodorizer the pouch.    Agreeable to do a pouch change later this afternoon.    Ostomy DME order to [facility] was faxed to 's office.

## 2024-09-25 NOTE — PROGRESS NOTES
Today's Date: 9/25/2024  Patient Name: Yuri Phillips  Date of admission: 9/20/2024  6:53 AM  Patient's age: 44 y.o., 1980  Admission Dx: Cancer of sigmoid (HCC) [C18.7]  S/P left colectomy [Z90.49]    Reason for Consult: management recommendations  Requesting Physician: Keshia Villar MD    Chief Complaint: Elective ex lap with low anterior resection and flexible sigmoidoscopy.    History Obtained From:  patient, electronic medical record    Interval history:    Patient seen and examined at bedside.  No acute events overnight.  Labs unremarkable.  Denies any complaints today.  CT chest shows enlarged superior mediastinal lymph node located to the left of the esophagus and borderline enlarged lymph nodes in the right cardiophrenic sulcus.  Plan for outpatient PET scanning.  Diet advanced to regular.  Discharge planning in progress    History of Present Illness:      The patient is a 44 y.o. male with no significant past medical history presented initially to [facility] on 8/7 with chief complaints of of abdominal pain.  CT abdomen done at that time showed segmental circumferential wall thickening with the rib enhancing fluid collection measuring 5.8 cm x 2.2 x 3.2 cm concerning for colitis/proctitis.  Also had enlarged retroperitoneal lymph nodes measuring 2.2 x 1.9 cm.  GI evaluated the patient, patient underwent colonoscopy, he was found to have circumferential mucosal abnormality and the scope could not be passed above 20 cm from the anus.  Biopsies were obtained.  And he was discharged home on Levaquin and Flagyl.  His biopsy results came back positive for colonic adenocarcinoma with signet ring features.  Patient came in today for elective abdominal exploration with general surgery.  Underwent flexible sigmoidoscopy, exploratory laparotomy, creation of end colostomy and resection of multiple peritoneal masses.    Hematology oncology consulted for further management.    Past Medical History:    Diagnosis Date Noted    S/P left colectomy [Z90.49] 09/20/2024         Assessment:  Colonic adenocarcinoma.    Unresectable colon cancer from rectum to the proximal  sigmoid colon, large 5 cm mass at the root of small bowel mesentery, multiple areas of peritoneal studding.  Status post exploratory laparotomy, resection of multiple peritoneal masses, creation of end colostomy 9/20/24.  Previous biopsy from colonoscopy 8/9/24 positive for colon adenocarcinoma with signet cells.  History of Crohn's disease.  Concern for ileus/small bowel obstruction    Plan:  I reviewed the labs/imaging available to me,outside records and discussed with the patient.I explained to the patient the nature of this problem. I explained the significance of these abnormalities and possible etiology and management options  CEA is elevated at 58.4  Pathology report from colonoscopy confirms adenocarcinoma with signet ring features, mismatch repair proficient disease  Biopsy of the peritoneal mass pending.  CT of the chest completed 9/22 shows enlarged superior mediastinal lymph nodes to the left of the esophagus and borderline enlarged lymph nodes in the right cardiophrenic sulcus; concern for ileus/small bowel obstruction  Symptomatic and supportive care.  He will need to follow-up with Dr. Sanchez upon discharge, appointment scheduled.  Outpatient PET CT scan will need to be completed, appointment scheduled.  Hematology oncology nurse navigator is following..  Final recommendations to follow after discussing with attending physician, Dr. Ranjit Wheat MD  Internal Medicine Resident, PGY-3  [facility], [place], [place]  9/25/2024, 9:21 AM      Attending Physician Statement  The patient was seen and examined during rounds, I have discussed the care of Yuri Phillips, including pertinent history and exam findings with the resident. I have reviewed the key elements of all parts of the encounter with the resident.  I agree with  the assessment, and status of the problem list as documented.   Additional assessment/ plan    Discussed path report  This is stage IV colon cancer unfortunately.  Discussed with the patient that his disease is unlikely to be curable but it is treatable.  Plan for outpatient NGS testing and starting treatment once patient improved from surgery            -- Diagnosis Comment --   The morphology in both the peritoneal and umbilical masses is similar,   and is favored to represent metastatic disease from recently diagnosed   colonic primary.     Catalina Land M.D.   **Electronically Signed Out**         [facility]/9/24/2024      Clinical Information   Pre-Op Diagnosis:  CANCER OF SIGMOID   Operative Findings:  PERITONEUM MASS; UMBILICAL MASS   Operation Performed:  ABDOMINAL EXPLORATION, LOW ANTERIOR RESECTION;   FLEXIBLE SIGMOIDOSCOPY- GI SCHEDULED   se               Source of Specimen   A: PERITONEUM MASS   B: UMBILICAL MASS       Gross Description   A.  \"MARCELO DENIS, PERITONEUM MASS\" Received in formalin is a 4.5 x   4.0 x 1.5 cm aggregate of yellow-red, lobulated, fatty to fibrous   tissue.  Sectioning reveals bright yellow to fibrotic cut surfaces.   Totally embedded 4c.   B.  \"MARCELO DENIS, UMBILICAL MASS\" Received in formalin are two   fragments of yellow, lobulated to fibrotic tissue fragments, 1.7 cm   and 2.0 cm.  Sectioning reveals tan, rubbery, fibrotic to lobulated   and fatty cut surfaces.  Totally embedded 2c.  darlin Ferrer/se:9/20/2024   Microscopic Description   A, B. Microscopic examination performed. Immunostains were performed   with appropriate controls on block B2. Neoplastic cells are positive   for CK20 (weak, focal), CDX2, and SATB2.     Block for additional testing: B2         Processing Lab:  [address] [id number]   Interpretation Performed at [address] [id number]     SURGICAL

## 2024-09-25 NOTE — PLAN OF CARE
Problem: Discharge Planning  Goal: Discharge to home or other facility with appropriate resources  Outcome: Progressing     Problem: Pain  Goal: Verbalizes/displays adequate comfort level or baseline comfort level  Outcome: Progressing     Problem: Safety - Adult  Goal: Free from fall injury  Outcome: Progressing     Problem: Nutrition Deficit:  Goal: Optimize nutritional status  Outcome: Progressing     Problem: ABCDS Injury Assessment  Goal: Absence of physical injury  Outcome: Progressing     Problem: Discharge Planning  Goal: Discharge to home or other facility with appropriate resources  Outcome: Progressing     Problem: Pain  Goal: Verbalizes/displays adequate comfort level or baseline comfort level  Outcome: Progressing     Problem: Safety - Adult  Goal: Free from fall injury  Outcome: Progressing     Problem: Nutrition Deficit:  Goal: Optimize nutritional status  Outcome: Progressing     Problem: ABCDS Injury Assessment  Goal: Absence of physical injury  Outcome: Progressing

## 2024-09-25 NOTE — PROGRESS NOTES
Colorectal Surgery:  Daily Progress Note          POD # 5 s/p exploratory laparotomy, creation of end colostomy, resection of multiple peritoneal mases          PATIENT NAME: Yuri Phillips     TODAY'S DATE: 9/25/2024, 8:08 AM  CC:  throat pain    SUBJECTIVE:     Pt seen and examined at bedside. No acute events overnight. Afebrile, vital signs within normal limits on room air. Continues to have output from ostomy. Tolerating full liquid diet. States it feels like pills are getting stuck in his throat.     OBJECTIVE:   VITALS:  /79   Pulse 60   Temp 99 °F (37.2 °C) (Oral)   Resp 20   Ht 1.702 m (5' 7.01\")   Wt 95.3 kg (210 lb)   SpO2 98%   BMI 32.88 kg/m²      INTAKE/OUTPUT:      Intake/Output Summary (Last 24 hours) at 9/25/2024 0808  Last data filed at 9/24/2024 1656  Gross per 24 hour   Intake 5256.28 ml   Output 100 ml   Net 5156.28 ml         PHYSICAL EXAM:  General Appearance: awake, alert, oriented, in no acute distress  HEENT:  Normocephalic, atraumatic, mucus membranes moist   Lungs: symmetric chest wall expansion, no accessory muscle use  Abdomen:soft, distended, tender to palpation, surgical dressing with strike through, left sided colostomy is pink with formed stool in appliance  Extremities: No cyanosis, pitting edema, rashes noted.    Skin: Skin color, texture, turgor normal. No rashes or lesions.      Data:  CBC with Differential:    Lab Results   Component Value Date/Time    WBC 8.3 09/24/2024 07:47 AM    RBC 4.05 09/24/2024 07:47 AM    HGB 11.5 09/24/2024 07:47 AM    HCT 35.8 09/24/2024 07:47 AM    PLT See Reflexed IPF Result 09/24/2024 07:47 AM    MCV 88.4 09/24/2024 07:47 AM    MCH 28.4 09/24/2024 07:47 AM    MCHC 32.1 09/24/2024 07:47 AM    RDW 15.1 09/24/2024 07:47 AM    LYMPHOPCT 19 09/24/2024 07:47 AM    MONOPCT 8 09/24/2024 07:47 AM    EOSPCT 1 09/24/2024 07:47 AM    BASOPCT 0 09/24/2024 07:47 AM    MONOSABS 0.65 09/24/2024 07:47 AM    LYMPHSABS 1.54 09/24/2024 07:47 AM    EOSABS

## 2024-09-25 NOTE — PROGRESS NOTES
09/25/24 1604   Colostomy LUQ   Placement Date/Time: 09/20/24 0951   Present on Admission/Arrival: No  Location: LUQ   Stomal Appliance Changed;1 piece;Flat   Stoma  Assessment Red;Moist;Protrudes  (35 mm sl oval)   Peristomal Assessment Intact   Mucocutaneous Junction Intact   Treatment Pouch change;Site care;Liquid skin barrier   Stool Appearance Loose   Stool Color Brown     Assisted patient to change his ostomy pouch at bedside with stand by coaching.  Reviewed components of ostomy care.  He can empty the pouch.  He feels confident in knowing what to do.    Spoke with his God-daughter on the phone who is on her way to the hospital, driving.    Advised I will provide a reference sheet with QR codes with link to videos  on pouch changes.  Advised that the Supply order was faxed to Dr. Villar's office for signature and will then go to [employer]; may take a few days to process.  Writer will follow up with Dr. Villar's office Monday (office is closed until Monday due to a move)    Provided with extra pouches and supplies.    Recommended supplies:    Coloplast CELY,  flat MAXI,  CTF drainable  pouch  #[id number]  SafeNSimple Skin barrier wipe #[id number]  SafeNSimple  Adhesive Remover wipe #[id number]    Adapt Odor Eliminator Lubricant  #[id number]

## 2024-09-26 ENCOUNTER — TELEPHONE (OUTPATIENT)
Dept: ONCOLOGY | Age: 44
End: 2024-09-26

## 2024-09-26 NOTE — PROGRESS NOTES
Patient discharged home. Discharge instructions were explained and provided to the patient. Patient verbalized understanding. Ostomy supplies were provided by wound care. All belongings were sent with the patient. No signs of acute distress noted at discharge, no complaints voiced.

## 2024-10-01 ENCOUNTER — TELEPHONE (OUTPATIENT)
Dept: ONCOLOGY | Age: 44
End: 2024-10-01

## 2024-10-01 NOTE — PROGRESS NOTES
Physician Progress Note      PATIENT:               MARCELO BARRIOS  CSN #:                  [id number]  :                       1980  ADMIT DATE:       2024 6:53 AM  DISCH DATE:        2024 9:40 PM  RESPONDING  PROVIDER #:        ADA GOYAL          QUERY TEXT:    Pt admitted with malignant neoplasm of sigmoid colon and has malnutrition   documented in nutrition malnutrition assessment on . Please further   specify type of malnutrition with documentation in the medical record.    The medical record reflects the following:  Risk Factors: Colon cancer colitis GERD  Clinical Indicators: BMI 32.8, hypoactive bowel sounds, colostomy, abdominal   pain vomiting diarrhea for the last 2 months weight loss of 27% n.p.o. status  Malnutrition Assessment:  Malnutrition Status:  Moderate malnutrition (24 1522)  Context:  Chronic Illness  Findings of the 6 clinical characteristics of malnutrition:  Energy Intake:  75% or less estimated energy requirements for 1 month or   longer  Weight Loss:  Mild weight loss - 27% x past 7 months per chart review.  Body Fat Loss:  Mild body fat loss Orbital  Muscle Mass Loss:  No significant muscle mass loss  Fluid Accumulation:  No significant fluid accumulation  Treatment: Dietitian assessment, daily weights,    Thank you for your time,  Natasha Copeland RN, BSN CDS  [email or web address]    ASPEN Criteria:    https://aspenjournals.onlinelibrary.martínez.com/doi/full/10.1177/995258291257345  5  Options provided:  -- Moderate Malnutrition  -- Moderate Protein calorie malnutrition  -- Other - I will add my own diagnosis  -- Disagree - Not applicable / Not valid  -- Disagree - Clinically unable to determine / Unknown  -- Refer to Clinical Documentation Reviewer    PROVIDER RESPONSE TEXT:    This patient has moderate protein calorie malnutrition.    Query created by: Natasha Copeland on 2024 10:46 AM      Electronically signed by:  ADA GOYAL 10/1/2024 7:26

## 2024-10-01 NOTE — TELEPHONE ENCOUNTER
Name: Yuri Phillips  : 1980  MRN: [id number]    Oncology Navigation- Initial Note:    Intake-  Contact Type: Telephone    Diagnosis: GI- malignant    Home Disposition: Lives alone    Patient needs and barriers to care: Nio Barriers Identified     Referral Source: Health Professional    Interventions-   General Interventions: none     Education/Screenings:  no     Currently on HRT: no     Referrals: none     Biopsy site status: colon     Smoking hx: denies smoking       Continuum of Care: Diagnosis/Active Treatment    Notes: Writer called pt and introduced self as navigator. Name and contact number provided and writer explained my role in his care moving forward. Pt lives at home alone and does drive himself to appts. When talking to pt, he seemed somewhat confused or overwhelmed with remembering his appts. Writer did send pt a text with all up coming appts/dates/times and instructions and writer will give him a reminder call day before.     Pts  home health nurse Angelique was there during call. Writer spoke to her and provided my contact information. Pt has colostomy bag. Pt states he thought he ate bad food, started vomiting and then drove himself to ER.    Pt has PET scan on 10/8 and f/u with Dr. Sanchez on 10/10 for results and plan. Pt appreciative of call and support, will continue to follow.    Electronically signed by Jovanna Carter RN on 10/1/2024 at 1:26 PM

## 2024-10-08 ENCOUNTER — OFFICE VISIT (OUTPATIENT)
Dept: PRIMARY CARE CLINIC | Age: 44
End: 2024-10-08
Payer: COMMERCIAL

## 2024-10-08 ENCOUNTER — HOSPITAL ENCOUNTER (OUTPATIENT)
Dept: NUCLEAR MEDICINE | Age: 44
Discharge: HOME | End: 2024-10-10
Attending: INTERNAL MEDICINE
Payer: COMMERCIAL

## 2024-10-08 VITALS
HEART RATE: 74 BPM | WEIGHT: 213.6 LBS | OXYGEN SATURATION: 100 % | SYSTOLIC BLOOD PRESSURE: 113 MMHG | DIASTOLIC BLOOD PRESSURE: 77 MMHG | HEIGHT: 67 IN | BODY MASS INDEX: 33.53 KG/M2

## 2024-10-08 DIAGNOSIS — Z90.49 S/P LEFT COLECTOMY: ICD-10-CM

## 2024-10-08 DIAGNOSIS — C18.9 ADENOCARCINOMA OF COLON (HCC): ICD-10-CM

## 2024-10-08 DIAGNOSIS — Z76.89 ENCOUNTER TO ESTABLISH CARE: Primary | ICD-10-CM

## 2024-10-08 DIAGNOSIS — Z13.9 DUE FOR SCREENING: ICD-10-CM

## 2024-10-08 DIAGNOSIS — C18.8 OVERLAPPING MALIGNANT NEOPLASM OF COLON (HCC): ICD-10-CM

## 2024-10-08 LAB — GLUCOSE BLD-MCNC: 92 MG/DL (ref 75–110)

## 2024-10-08 PROCEDURE — 3430000000 HC RX DIAGNOSTIC RADIOPHARMACEUTICAL: Performed by: INTERNAL MEDICINE

## 2024-10-08 PROCEDURE — 2580000003 HC RX 258: Performed by: INTERNAL MEDICINE

## 2024-10-08 PROCEDURE — 99203 OFFICE O/P NEW LOW 30 MIN: CPT | Performed by: NURSE PRACTITIONER

## 2024-10-08 PROCEDURE — 82947 ASSAY GLUCOSE BLOOD QUANT: CPT

## 2024-10-08 PROCEDURE — A9609 HC RX DIAGNOSTIC RADIOPHARMACEUTICAL: HCPCS | Performed by: INTERNAL MEDICINE

## 2024-10-08 PROCEDURE — 78815 PET IMAGE W/CT SKULL-THIGH: CPT

## 2024-10-08 RX ORDER — FLUDEOXYGLUCOSE F 18 200 MCI/ML
10 INJECTION, SOLUTION INTRAVENOUS
Status: COMPLETED | OUTPATIENT
Start: 2024-10-08 | End: 2024-10-08

## 2024-10-08 RX ORDER — SODIUM CHLORIDE 0.9 % (FLUSH) 0.9 %
10 SYRINGE (ML) INJECTION ONCE
Status: COMPLETED | OUTPATIENT
Start: 2024-10-08 | End: 2024-10-08

## 2024-10-08 RX ADMIN — FLUDEOXYGLUCOSE F 18 10 MILLICURIE: 200 INJECTION, SOLUTION INTRAVENOUS at 14:29

## 2024-10-08 RX ADMIN — SODIUM CHLORIDE, PRESERVATIVE FREE 10 ML: 5 INJECTION INTRAVENOUS at 14:29

## 2024-10-08 SDOH — ECONOMIC STABILITY: FOOD INSECURITY: WITHIN THE PAST 12 MONTHS, THE FOOD YOU BOUGHT JUST DIDN'T LAST AND YOU DIDN'T HAVE MONEY TO GET MORE.: NEVER TRUE

## 2024-10-08 SDOH — ECONOMIC STABILITY: FOOD INSECURITY: WITHIN THE PAST 12 MONTHS, YOU WORRIED THAT YOUR FOOD WOULD RUN OUT BEFORE YOU GOT MONEY TO BUY MORE.: NEVER TRUE

## 2024-10-08 SDOH — ECONOMIC STABILITY: INCOME INSECURITY: HOW HARD IS IT FOR YOU TO PAY FOR THE VERY BASICS LIKE FOOD, HOUSING, MEDICAL CARE, AND HEATING?: NOT HARD AT ALL

## 2024-10-08 ASSESSMENT — ENCOUNTER SYMPTOMS
DIARRHEA: 0
SHORTNESS OF BREATH: 0
SINUS PAIN: 0
BACK PAIN: 0
CHEST TIGHTNESS: 0
PHOTOPHOBIA: 0
COLOR CHANGE: 0
NAUSEA: 0
SINUS PRESSURE: 0
SORE THROAT: 0
ABDOMINAL PAIN: 0
VOMITING: 0
COUGH: 0

## 2024-10-08 ASSESSMENT — PATIENT HEALTH QUESTIONNAIRE - PHQ9
1. LITTLE INTEREST OR PLEASURE IN DOING THINGS: NOT AT ALL
2. FEELING DOWN, DEPRESSED OR HOPELESS: NOT AT ALL
SUM OF ALL RESPONSES TO PHQ QUESTIONS 1-9: 0
SUM OF ALL RESPONSES TO PHQ9 QUESTIONS 1 & 2: 0

## 2024-10-08 NOTE — PROGRESS NOTES
pain, nausea or vomiting.  States he is eating and drinking without issue.  ROS negative. BP well controlled.  Hospital labs reviewed.     No pertinent needs form [facility] stand point currently.  Needs to follow up with Colorectal surgery and hem/onc.     RTC 3 months    Past Medical History:   Diagnosis Date    Colitis     Colon cancer (HCC)     GERD (gastroesophageal reflux disease)     Migraines     Seizure (HCC)     No seizures since 2016 (as of 2024) - not taking any medications    Under care of service provider 09/05/2024    no pcp at this time    Wears glasses       Past Surgical History:   Procedure Laterality Date    ABDOMINAL EXPLORATION SURGERY N/A 09/20/2024    ABDOMINAL EXPLORATION, LOW ANTERIOR RESECTION, LYSIS OF ADHESIONS, MULTIPLE ABDOMINAL WALL BIOPSIES, BIOPSY OF RECTAL MASS, COLOSTOMY CREATION, BIOPSY OF UMBILICAL MASS    COLECTOMY N/A 9/20/2024    ABDOMINAL EXPLORATION, LOW ANTERIOR RESECTION, LYSIS OF ADHESIONS, MULTIPLE ABDOMINAL WALL BIOPSIES, BIOPSY OF RECTAL MASS, COLOSTOMY CREATION, BIOPSY OF UMBILICAL MASS performed by Keshia Villar MD at [facility] OR    COLONOSCOPY N/A 08/09/2024    COLONOSCOPY BIOPSY performed by Fabiano Alvarenga MD at [facility] OR    FLEXIBLE SIGMOIDOSCOPY N/A 09/20/2024    FLEXIBLE SIGMOIDOSCOPY    PROCTOSIGMOIDOSCOPY N/A 9/20/2024    FLEXIBLE SIGMOIDOSCOPY -GI SCHEDULED performed by Keshia Villar MD at [facility] OR     Family History   Problem Relation Age of Onset    Diabetes Mother     High Blood Pressure Father     Diabetes Sister     Cancer Neg Hx     Migraines Neg Hx     Seizures Neg Hx      Social History     Tobacco Use    Smoking status: Never     Passive exposure: Never    Smokeless tobacco: Never   Substance Use Topics    Alcohol use: Not Currently      Current Outpatient Medications   Medication Sig Dispense Refill    LEVOFLOXACIN PO Take 1 tablet by mouth daily      metroNIDAZOLE (FLAGYL PO) Take 1 tablet by mouth in the morning, at noon, and at bedtime (Patient not

## 2024-10-10 ENCOUNTER — TELEPHONE (OUTPATIENT)
Dept: ONCOLOGY | Age: 44
End: 2024-10-10

## 2024-10-10 ENCOUNTER — OFFICE VISIT (OUTPATIENT)
Dept: ONCOLOGY | Age: 44
End: 2024-10-10
Payer: COMMERCIAL

## 2024-10-10 VITALS
WEIGHT: 209.2 LBS | RESPIRATION RATE: 16 BRPM | TEMPERATURE: 97 F | DIASTOLIC BLOOD PRESSURE: 66 MMHG | SYSTOLIC BLOOD PRESSURE: 109 MMHG | BODY MASS INDEX: 32.76 KG/M2 | HEART RATE: 56 BPM

## 2024-10-10 DIAGNOSIS — C18.7 CANCER OF SIGMOID (HCC): Primary | ICD-10-CM

## 2024-10-10 DIAGNOSIS — R63.4 WEIGHT LOSS: ICD-10-CM

## 2024-10-10 DIAGNOSIS — C77.1 METASTASIS TO MEDIASTINAL LYMPH NODE (HCC): ICD-10-CM

## 2024-10-10 DIAGNOSIS — R59.0 LYMPHADENOPATHY, RETROPERITONEAL: ICD-10-CM

## 2024-10-10 PROCEDURE — 99211 OFF/OP EST MAY X REQ PHY/QHP: CPT | Performed by: INTERNAL MEDICINE

## 2024-10-10 PROCEDURE — 99215 OFFICE O/P EST HI 40 MIN: CPT | Performed by: INTERNAL MEDICINE

## 2024-10-10 NOTE — TELEPHONE ENCOUNTER
MARCELO HATHAWAY FOR MD VISIT  Port cath  [facility]  Chemotherapy  Rtc next clinic  PORT PLACEMENT WILL BE CALLED ON 10/11/24 DUE TO OFFICE BEING CLOSED  [facility] TESTING WILL BE DONE ON 10/16/24 @ 10AM AT [facility]   NEW ORDER IS PENDING PRECERT  MD VISIT C1D1  AVS PRINTED W/ INSTRUCTIONS AND GIVEN TO PT ON EXIT

## 2024-10-10 NOTE — PROGRESS NOTES
Patient ID: Marcelo Barrios, 1980, [id number], 44 y.o.  Referred by :  No ref. provider found   Reason for consultation: Stage IV sigmoid cancer            HISTORY OF PRESENT ILLNESS:    Oncologic History:    Marcelo Barrios is a very pleasant 44 y.o. male.presented initially to [facility] on 8/7/2024 with chief complaints of of abdominal pain.  CT abdomen done at that time showed segmental circumferential wall thickening with the rib enhancing fluid collection measuring 5.8 cm x 2.2 x 3.2 cm concerning for colitis/proctitis.  Also had enlarged retroperitoneal lymph nodes measuring 2.2 x 1.9 cm.  GI evaluated the patient, patient underwent colonoscopy, he was found to have circumferential mucosal abnormality and the scope could not be passed above 20 cm from the anus.  Biopsies were obtained.  And he was discharged home on Levaquin and Flagyl.  His biopsy results came back positive for colonic adenocarcinoma with signet ring features.  Underwent flexible sigmoidoscopy, exploratory laparotomy, creation of end colostomy and resection of multiple peritoneal masses for unresectable colon cancer from the rectum to the proximal sigmoid colon on September 20, 2024  Baseline CEA 58.4;      URGICAL PATHOLOGY CONSULTATION      Patient Name: MARCELO BARRIOS   [facility] Rec: [id number]   Path Number: [id number]   Collected: 8/9/2024   Received: 8/9/2024   Reported: 8/15/2024 09:16     -- Diagnosis --   COLON, SITE NOT SPECIFIED, BIOPSY:   -COLONIC ADENOCARCINOMA WITH SIGNET RING FEATURES.       IHC interpretation for MMR proteins:   No loss of nuclear expression of MMR proteins: low probability of   microsatellite instability-high (MSI-H).     9/20/24 [facility] [place][note 2]    Surgical Pathology Report Path Number: [id number]    -- Diagnosis --  A. PERITONEUM MASS, EXCISION:  Metastatic adenocarcinoma with mucinous  differentiation and signet ring cell features.     B. UMBILICAL MASS, EXCISION:  Metastatic adenocarcinoma with

## 2024-10-11 ENCOUNTER — TELEPHONE (OUTPATIENT)
Dept: ONCOLOGY | Age: 44
End: 2024-10-11

## 2024-10-11 LAB — TEMPUS PORTAL: NORMAL

## 2024-10-11 NOTE — TELEPHONE ENCOUNTER
Left vm to schedule appointment. Will plan to schedule Amb[facility] kit with [facility] kit on 10/16/24 (unless sooner). Will place referral to [facility] once sample is collected.

## 2024-10-14 ENCOUNTER — TELEPHONE (OUTPATIENT)
Dept: ONCOLOGY | Age: 44
End: 2024-10-14

## 2024-10-14 NOTE — TELEPHONE ENCOUNTER
Was able to get in contact with patient. Informed patient that genetic kit will be drawn on 10/16/24 with [facility] kit. Also, that Genetic referral will then be sent to [facility]. [facility] will coordinate genetic counseling appointment.

## 2024-10-16 ENCOUNTER — HOSPITAL ENCOUNTER (OUTPATIENT)
Age: 44
Discharge: HOME | End: 2024-10-16
Payer: COMMERCIAL

## 2024-10-16 ENCOUNTER — TELEPHONE (OUTPATIENT)
Dept: ONCOLOGY | Age: 44
End: 2024-10-16

## 2024-10-16 ENCOUNTER — TELEPHONE (OUTPATIENT)
Dept: INFUSION THERAPY | Facility: MEDICAL CENTER | Age: 44
End: 2024-10-16

## 2024-10-16 DIAGNOSIS — C18.7 CANCER OF SIGMOID (HCC): Primary | ICD-10-CM

## 2024-10-16 DIAGNOSIS — C18.7 CANCER OF SIGMOID (HCC): ICD-10-CM

## 2024-10-16 LAB
ALBUMIN SERPL-MCNC: 4.2 G/DL (ref 3.5–5.2)
ALP SERPL-CCNC: 86 U/L (ref 40–129)
ALT SERPL-CCNC: 8 U/L (ref 5–41)
ANION GAP SERPL CALCULATED.3IONS-SCNC: 12 MMOL/L (ref 9–17)
AST SERPL-CCNC: 9 U/L
BASOPHILS # BLD: <0.03 K/UL (ref 0–0.2)
BASOPHILS NFR BLD: 0 % (ref 0–2)
BILIRUB SERPL-MCNC: 0.3 MG/DL (ref 0.3–1.2)
BILIRUB UR QL STRIP: NEGATIVE
BUN SERPL-MCNC: 9 MG/DL (ref 6–20)
BUN/CREAT SERPL: 10 (ref 9–20)
CALCIUM SERPL-MCNC: 9.6 MG/DL (ref 8.6–10.4)
CHLORIDE SERPL-SCNC: 106 MMOL/L (ref 98–107)
CLARITY UR: CLEAR
CO2 SERPL-SCNC: 25 MMOL/L (ref 20–31)
COLOR UR: YELLOW
CREAT SERPL-MCNC: 0.9 MG/DL (ref 0.7–1.2)
CRYSTALS URNS MICRO: ABNORMAL /HPF
EOSINOPHIL # BLD: 0.3 K/UL (ref 0–0.44)
EOSINOPHILS RELATIVE PERCENT: 5 % (ref 1–4)
EPI CELLS #/AREA URNS HPF: ABNORMAL /HPF (ref 0–5)
ERYTHROCYTE [DISTWIDTH] IN BLOOD BY AUTOMATED COUNT: 15 % (ref 11.8–14.4)
GFR, ESTIMATED: >90 ML/MIN/1.73M2
GLUCOSE SERPL-MCNC: 99 MG/DL (ref 70–99)
GLUCOSE UR STRIP-MCNC: NEGATIVE MG/DL
HCT VFR BLD AUTO: 37.8 % (ref 40.7–50.3)
HGB BLD-MCNC: 12.2 G/DL (ref 13–17)
HGB UR QL STRIP.AUTO: NEGATIVE
IMM GRANULOCYTES # BLD AUTO: 0.01 K/UL (ref 0–0.3)
IMM GRANULOCYTES NFR BLD: 0 %
KETONES UR STRIP-MCNC: NEGATIVE MG/DL
LEUKOCYTE ESTERASE UR QL STRIP: NEGATIVE
LYMPHOCYTES NFR BLD: 1.43 K/UL (ref 1.1–3.7)
LYMPHOCYTES RELATIVE PERCENT: 25 % (ref 24–43)
MCH RBC QN AUTO: 28.3 PG (ref 25.2–33.5)
MCHC RBC AUTO-ENTMCNC: 32.3 G/DL (ref 28.4–34.8)
MCV RBC AUTO: 87.7 FL (ref 82.6–102.9)
MONOCYTES NFR BLD: 0.44 K/UL (ref 0.1–1.2)
MONOCYTES NFR BLD: 8 % (ref 3–12)
NEUTROPHILS NFR BLD: 62 % (ref 36–65)
NEUTS SEG NFR BLD: 3.46 K/UL (ref 1.5–8.1)
NITRITE UR QL STRIP: NEGATIVE
NRBC BLD-RTO: 0 PER 100 WBC
PH UR STRIP: 5.5 [PH] (ref 5–8)
PLATELET # BLD AUTO: 419 K/UL (ref 138–453)
PMV BLD AUTO: 7.9 FL (ref 8.1–13.5)
POTASSIUM SERPL-SCNC: 4.1 MMOL/L (ref 3.7–5.3)
PROT SERPL-MCNC: 7.8 G/DL (ref 6.4–8.3)
PROT UR STRIP-MCNC: ABNORMAL MG/DL
RBC # BLD AUTO: 4.31 M/UL (ref 4.21–5.77)
RBC # BLD: ABNORMAL 10*6/UL
RBC #/AREA URNS HPF: ABNORMAL /HPF (ref 0–2)
SODIUM SERPL-SCNC: 143 MMOL/L (ref 135–144)
SP GR UR STRIP: 1.04 (ref 1–1.03)
UROBILINOGEN UR STRIP-ACNC: NORMAL EU/DL (ref 0–1)
WBC #/AREA URNS HPF: ABNORMAL /HPF (ref 0–5)
WBC OTHER # BLD: 5.7 K/UL (ref 3.5–11.3)

## 2024-10-16 PROCEDURE — 85025 COMPLETE CBC W/AUTO DIFF WBC: CPT

## 2024-10-16 PROCEDURE — 80053 COMPREHEN METABOLIC PANEL: CPT

## 2024-10-16 PROCEDURE — 36415 COLL VENOUS BLD VENIPUNCTURE: CPT

## 2024-10-16 PROCEDURE — 81001 URINALYSIS AUTO W/SCOPE: CPT

## 2024-10-16 NOTE — TELEPHONE ENCOUNTER
I TRIED TO CALL MARCELO TO SCHEDULE HIS TX AND MD VISIT AND EDUCATION AND HAD TO LEAVE A MESSAGE TO CALL THE OFFICE TO SCHEDULE.

## 2024-10-16 NOTE — TELEPHONE ENCOUNTER
Patient completed genetic testing today. Sample was sent out via [employer]. Tracking # is [id number]. Writer informed patient to expect a call from [facility] to schedule genetic counseling appointment. Once genetic counseling is complete, the sample will be processed.

## 2024-10-16 NOTE — TELEPHONE ENCOUNTER
RN check new chemotherapy orders per Dr. 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      14 day cycle MVASI, Oxaliplatin, Leucovoin, Adrucil Chemo syringe, Adrucil Chemo Infusion pump.      Ht - 170.2 cm  Wt - 94.9 kg  BSA = 2.12      MVASI 5 mg/kg = 474.5 mg rounded to 475 mg IV day 1.  Oxaliplatin 85 mg/m2 = 180.2 mg rounded to 180 mg IV day 1.  Leucovorin 400 mg/m2 = 800 mg (capped BSA)  IV day 1.  Adrucil Chemo Syringe 400 mg/m2 = 848 mg rounded to 850 mg IV day 1.  Adrucil Chemo Infusion Pump 2400 mg/m2 = 4992 mg (capped BSA) rounded to 5000 mg IV day 1.        Labs to epic. Order noted. Chart to front office for processing. Note to pool for 2nd RN check.

## 2024-10-18 ENCOUNTER — HOSPITAL ENCOUNTER (OUTPATIENT)
Facility: MEDICAL CENTER | Age: 44
End: 2024-10-18
Payer: COMMERCIAL

## 2024-10-21 ENCOUNTER — TELEPHONE (OUTPATIENT)
Dept: ONCOLOGY | Age: 44
End: 2024-10-21

## 2024-10-21 NOTE — TELEPHONE ENCOUNTER
Name: Yuri Phillips  : 1980  MRN: [id number]    Oncology Navigation Follow-Up Note    Contact Type:  Telephone    Notes: Writer received a call from Heidi in IR stating that pt cancelled his PORT placement for tomorrow stating he's getting a second opinion. Writer called pt to follow up with him on above.     Pt states he wanted to get a second opinion on if he really needed chemo or not. He states he knows people who have  from getting chemo and he's getting scared. After a long discussion and explanation on why Dr. Sanchez recommends chemo, pt states he has a better understanding now and is stating he will proceed with PORT tomorrow and chemo next Tuesday. Writer alerted Mai in IR to place pt back on schedule tomorrow.      Electronically signed by Jovanna Carter RN on 10/21/2024 at 12:51 PM

## 2024-10-22 ENCOUNTER — TELEPHONE (OUTPATIENT)
Dept: ONCOLOGY | Age: 44
End: 2024-10-22

## 2024-10-22 ENCOUNTER — HOSPITAL ENCOUNTER (OUTPATIENT)
Dept: INTERVENTIONAL RADIOLOGY/VASCULAR | Age: 44
Discharge: HOME | End: 2024-10-24
Payer: COMMERCIAL

## 2024-10-22 VITALS
BODY MASS INDEX: 34.01 KG/M2 | OXYGEN SATURATION: 99 % | HEART RATE: 60 BPM | HEIGHT: 66 IN | TEMPERATURE: 97.7 F | DIASTOLIC BLOOD PRESSURE: 81 MMHG | SYSTOLIC BLOOD PRESSURE: 131 MMHG | RESPIRATION RATE: 21 BRPM | WEIGHT: 211.6 LBS

## 2024-10-22 DIAGNOSIS — C18.7 CARCINOMA OF SIGMOID COLON (HCC): ICD-10-CM

## 2024-10-22 LAB
INR PPP: 1
PARTIAL THROMBOPLASTIN TIME: 24.1 SEC (ref 23.9–33.8)
PLATELET # BLD AUTO: 335 K/UL (ref 138–453)
PROTHROMBIN TIME: 13.3 SEC (ref 11.5–14.2)

## 2024-10-22 PROCEDURE — 36561 INSERT TUNNELED CV CATH: CPT

## 2024-10-22 PROCEDURE — 99152 MOD SED SAME PHYS/QHP 5/>YRS: CPT

## 2024-10-22 PROCEDURE — 6360000002 HC RX W HCPCS: Performed by: RADIOLOGY

## 2024-10-22 PROCEDURE — 77001 FLUOROGUIDE FOR VEIN DEVICE: CPT

## 2024-10-22 PROCEDURE — 2580000003 HC RX 258: Performed by: RADIOLOGY

## 2024-10-22 PROCEDURE — 76937 US GUIDE VASCULAR ACCESS: CPT

## 2024-10-22 PROCEDURE — 85610 PROTHROMBIN TIME: CPT

## 2024-10-22 PROCEDURE — 99153 MOD SED SAME PHYS/QHP EA: CPT

## 2024-10-22 PROCEDURE — 7100000010 HC PHASE II RECOVERY - FIRST 15 MIN: Performed by: RADIOLOGY

## 2024-10-22 PROCEDURE — C1788 PORT, INDWELLING, IMP: HCPCS

## 2024-10-22 PROCEDURE — 7100000011 HC PHASE II RECOVERY - ADDTL 15 MIN: Performed by: RADIOLOGY

## 2024-10-22 PROCEDURE — 85730 THROMBOPLASTIN TIME PARTIAL: CPT

## 2024-10-22 PROCEDURE — 85049 AUTOMATED PLATELET COUNT: CPT

## 2024-10-22 RX ORDER — MIDAZOLAM HYDROCHLORIDE 1 MG/ML
INJECTION, SOLUTION INTRAMUSCULAR; INTRAVENOUS PRN
Status: COMPLETED | OUTPATIENT
Start: 2024-10-22 | End: 2024-10-22

## 2024-10-22 RX ORDER — HEPARIN 100 UNIT/ML
SYRINGE INTRAVENOUS PRN
Status: COMPLETED | OUTPATIENT
Start: 2024-10-22 | End: 2024-10-22

## 2024-10-22 RX ORDER — FENTANYL CITRATE 50 UG/ML
INJECTION, SOLUTION INTRAMUSCULAR; INTRAVENOUS PRN
Status: COMPLETED | OUTPATIENT
Start: 2024-10-22 | End: 2024-10-22

## 2024-10-22 RX ORDER — SODIUM CHLORIDE 9 MG/ML
INJECTION, SOLUTION INTRAVENOUS CONTINUOUS
Status: DISCONTINUED | OUTPATIENT
Start: 2024-10-22 | End: 2024-10-25 | Stop reason: HOSPADM

## 2024-10-22 RX ORDER — SODIUM CHLORIDE 9 MG/ML
INJECTION, SOLUTION INTRAVENOUS PRN
Status: DISCONTINUED | OUTPATIENT
Start: 2024-10-22 | End: 2024-10-25 | Stop reason: HOSPADM

## 2024-10-22 RX ORDER — SODIUM CHLORIDE 0.9 % (FLUSH) 0.9 %
5-40 SYRINGE (ML) INJECTION EVERY 12 HOURS SCHEDULED
Status: DISCONTINUED | OUTPATIENT
Start: 2024-10-22 | End: 2024-10-25 | Stop reason: HOSPADM

## 2024-10-22 RX ORDER — SODIUM CHLORIDE 0.9 % (FLUSH) 0.9 %
5-40 SYRINGE (ML) INJECTION PRN
Status: DISCONTINUED | OUTPATIENT
Start: 2024-10-22 | End: 2024-10-25 | Stop reason: HOSPADM

## 2024-10-22 RX ADMIN — MIDAZOLAM 0.5 MG: 1 INJECTION INTRAMUSCULAR; INTRAVENOUS at 08:51

## 2024-10-22 RX ADMIN — HEPARIN 500 UNITS: 100 SYRINGE at 09:13

## 2024-10-22 RX ADMIN — SODIUM CHLORIDE: 9 INJECTION, SOLUTION INTRAVENOUS at 08:10

## 2024-10-22 RX ADMIN — FENTANYL CITRATE 50 MCG: 50 INJECTION INTRAMUSCULAR; INTRAVENOUS at 08:51

## 2024-10-22 RX ADMIN — CEFAZOLIN 1000 MG: 1 INJECTION, POWDER, FOR SOLUTION INTRAMUSCULAR; INTRAVENOUS at 08:42

## 2024-10-22 ASSESSMENT — PAIN - FUNCTIONAL ASSESSMENT
PAIN_FUNCTIONAL_ASSESSMENT: 0-10
PAIN_FUNCTIONAL_ASSESSMENT: 0-10
PAIN_FUNCTIONAL_ASSESSMENT: FACE, LEGS, ACTIVITY, CRY, AND CONSOLABILITY (FLACC)

## 2024-10-22 NOTE — BRIEF OP NOTE
Brief Postoperative Note    Yuir Phillips  YOB: 1980  [id number]    Pre-operative Diagnosis: Colon cancer    Post-operative Diagnosis: Same    Procedure: Port placement    Anesthesia: Moderate Sedation    Surgeons/Assistants: Jessee    Estimated Blood Loss: less than 50     Complications: None    Specimens: Was Not Obtained      Electronically signed by Eliud Hooks MD on 10/22/2024 at 9:30 AM

## 2024-10-22 NOTE — TELEPHONE ENCOUNTER
Left a vm for pt to return call. Writer will provide patient with [facility] phone # to schedule genetic counseling appointment.

## 2024-10-22 NOTE — PRE SEDATION
Sedation Pre-Procedure Note    Patient Name: Yuri Phillips   YOB: 1980  Room/Bed: Room/bed info not found  Medical Record Number: [id number]  Date: 10/22/2024   Time: 8:26 AM       Indication:  Colon cancer    Consent: I have discussed with the patient and/or the patient representative the indication, alternatives, and the possible risks and/or complications of the planned procedure and the anesthesia methods. The patient and/or patient representative appear to understand and agree to proceed.    Vital Signs:   Vitals:    10/22/24 0736   BP: 122/81   Pulse: 73   Resp: 18   Temp: 98.2 °F (36.8 °C)   SpO2: 100%       Past Medical History:   has a past medical history of Colitis, Colon cancer (HCC), GERD (gastroesophageal reflux disease), Migraines, Seizure (HCC), Under care of service provider, and Wears glasses.    Past Surgical History:   has a past surgical history that includes Colonoscopy (N/A, 08/09/2024); Abdominal exploration surgery (N/A, 09/20/2024); Flexible sigmoidoscopy (N/A, 09/20/2024); colectomy (N/A, 9/20/2024); and proctosigmoidoscopy (N/A, 9/20/2024).    Medications:   Scheduled Meds:    sodium chloride flush  5-40 mL IntraVENous 2 times per day    ceFAZolin (ANCEF) IV  1,000 mg IntraVENous On Call to OR     Continuous Infusions:    sodium chloride 20 mL/hr at 10/22/24 0810    sodium chloride       PRN Meds: sodium chloride flush, sodium chloride  Home Meds:   Prior to Admission medications    Medication Sig Start Date End Date Taking? Authorizing Provider   LEVOFLOXACIN PO Take 1 tablet by mouth daily  Patient not taking: Reported on 10/10/2024    Sheridan Rodrigues MD   metroNIDAZOLE (FLAGYL PO) Take 1 tablet by mouth in the morning, at noon, and at bedtime  Patient not taking: Reported on 9/20/2024    Sheridan Rodrigues MD   pantoprazole (PROTONIX) 40 MG tablet Take 1 tablet by mouth every morning (before breakfast)  Patient not taking: Reported on 10/10/2024 8/9/24   [place]

## 2024-10-22 NOTE — POST SEDATION
Sedation Post Procedure Note    Patient Name: Yuri Phillips   YOB: 1980  Room/Bed: Room/bed info not found  Medical Record Number: [id number]  Date: 10/22/2024   Time: 9:29 AM         Physicians/Assistants: Eliud Hooks MD, MD    Procedure Performed:  Port placement    Post-Sedation Vital Signs:  Vitals:    10/22/24 0915   BP: 111/69   Pulse: 80   Resp:    Temp:    SpO2:       Vital signs were reviewed and were stable after the procedure (see flow sheet for vitals)            Complications: none    Electronically signed by Eliud Hooks MD on 10/22/2024 at 9:29 AM

## 2024-10-22 NOTE — H&P
Block for additional testing: B2         Processing Lab:  [address] [address]   Interpretation Performed at [address] [id number]       IMAGING DATA:      PET CT SKULL BASE TO MID THIGH  Narrative: EXAMINATION:  WHOLE BODY PET/CT    10/8/2024    TECHNIQUE:  Following IV injection of 9.805 mCi of F-18 FDG, PET  tumor imaging was  acquired from the base of the skull to the mid thighs.  Computed tomography  was used for purposes of attenuation correction and anatomic localization.  Fusion imaging was utilized for interpretation.    Uptake time 54 min.  Glucose level 92 mg/dl.    COMPARISON:  CT chest 22 September 2024; CT abdomen and pelvis 7 August 2024.    HISTORY:  ORDERING SYSTEM PROVIDED HISTORY: Overlapping malignant neoplasm of colon  (HCC)  TECHNOLOGIST PROVIDED HISTORY:  Is the exam for prostate imaging?->No  staging  Reason for Exam: Overlapping malignant neoplasm of colon (HCC    FINDINGS:  HEAD/NECK: No metabolically active cervical adenopathy is present.    CHEST: A metabolically active left upper mediastinal lymph node is present  2.8 x 2.1 cm consistent with neoplasia.  No other metabolically active  mediastinal or hilar adenopathy.  No metabolically active pulmonary nodules  or masses are present.    ABDOMEN/PELVIS: Physiologic uptake of radiotracer in the liver, spleen and  urinary collecting system is noted.  The patient has midline incision with  mild FDG uptake along the incision line, within expected limits; skin clips  are still present.  A left lower quadrant diverting ostomy is noted without  suspicious uptake above expected.  There is peristomal adjacent fat stranding  and mild FDG uptake consistent with postoperative healing and inflammation.  Retroperitoneal lymphadenopathy is noted in the lower abdomen in the  infrarenal periaortic area with extension into the iliac distribution

## 2024-10-25 ENCOUNTER — HOSPITAL ENCOUNTER (OUTPATIENT)
Dept: INFUSION THERAPY | Facility: MEDICAL CENTER | Age: 44
Discharge: HOME | End: 2024-10-25
Payer: COMMERCIAL

## 2024-10-25 DIAGNOSIS — C18.7 CANCER OF SIGMOID (HCC): ICD-10-CM

## 2024-10-25 DIAGNOSIS — C77.1 METASTASIS TO MEDIASTINAL LYMPH NODE (HCC): Primary | ICD-10-CM

## 2024-10-25 PROCEDURE — 99211 OFF/OP EST MAY X REQ PHY/QHP: CPT

## 2024-10-25 RX ORDER — ONDANSETRON 4 MG/1
4 TABLET, FILM COATED ORAL EVERY 8 HOURS PRN
Qty: 90 TABLET | Refills: 1 | Status: SHIPPED | OUTPATIENT
Start: 2024-10-25

## 2024-10-25 RX ORDER — LIDOCAINE/PRILOCAINE 2.5 %-2.5%
CREAM (GRAM) TOPICAL
Qty: 30 G | Refills: 1 | Status: SHIPPED | OUTPATIENT
Start: 2024-10-25

## 2024-10-25 NOTE — PROGRESS NOTES
Patient arrives ambulatory for chemotherapy education.  Patient given tour of unit and amenities.   Patients sister Leonarda remained on the phone during chemo education and all questions answered.  Patient provided with chemotherapy education packet containing office phone number, pamphlets, nutrition guide, support services, [facility], etc.  Patient educated on chemotherapy regimen, including possible side effects, symptom management, flow of treatment day, when to call the doctor/go to ER, etc.  Showed patient the CADD pump; will have patient watch video and sign consent on treatment day.  Port site assessed; healing well.  Prescriptions sent for emla cream and zofran. Educated on uses of both.  [facility] letter given to patient.  Assessment paperwork filled out by patient, chemotherapy consent signed. Paperwork to front office for scanning.  All patient questions answered.   Patient discharged with chemo folder and calendar in hand. To return Tuesday for cycle 1 day 1 folfox + mvasi treatment.

## 2024-10-27 LAB
PD-L1 BY 22C3 TISS IMSTN DOC: NEGATIVE
TEMPUS PD-L1 (22C3) COMBINED POSITIVE SCORE: <1
TEMPUS PD-L1 (22C3) TUMOR PROPORTION SCORE: <1 %
TEMPUS PORTAL: NORMAL

## 2024-10-28 ENCOUNTER — TELEPHONE (OUTPATIENT)
Dept: ONCOLOGY | Age: 44
End: 2024-10-28

## 2024-10-28 DIAGNOSIS — C77.1 METASTASIS TO MEDIASTINAL LYMPH NODE (HCC): Primary | ICD-10-CM

## 2024-10-28 DIAGNOSIS — C18.7 CANCER OF SIGMOID (HCC): ICD-10-CM

## 2024-10-28 NOTE — TELEPHONE ENCOUNTER
Received an email back from [facility] stating they spoke to patient on 10/21, but unable to schedule at that time. Called again 10/28/24, no answer. Writer called patient to inform him to call [facility] and schedule counseling appt. Patient states he already spoke to them on the phone but doesn't have an appointment. Writer tried explaining again to the patient this has to be completed so his sample can be processed. Provided patient with [facility]'s phone #. Patient states he will call today.

## 2024-10-29 ENCOUNTER — HOSPITAL ENCOUNTER (OUTPATIENT)
Facility: MEDICAL CENTER | Age: 44
End: 2024-10-29
Payer: COMMERCIAL

## 2024-10-29 ENCOUNTER — TELEPHONE (OUTPATIENT)
Dept: ONCOLOGY | Age: 44
End: 2024-10-29

## 2024-10-29 ENCOUNTER — CLINICAL DOCUMENTATION (OUTPATIENT)
Facility: HOSPITAL | Age: 44
End: 2024-10-29

## 2024-10-29 ENCOUNTER — HOSPITAL ENCOUNTER (OUTPATIENT)
Dept: INFUSION THERAPY | Facility: MEDICAL CENTER | Age: 44
Discharge: HOME | End: 2024-10-29
Payer: COMMERCIAL

## 2024-10-29 VITALS
RESPIRATION RATE: 18 BRPM | TEMPERATURE: 98.4 F | DIASTOLIC BLOOD PRESSURE: 74 MMHG | SYSTOLIC BLOOD PRESSURE: 108 MMHG | HEART RATE: 89 BPM | BODY MASS INDEX: 34.56 KG/M2 | WEIGHT: 214.1 LBS

## 2024-10-29 DIAGNOSIS — C18.7 CANCER OF SIGMOID (HCC): Primary | ICD-10-CM

## 2024-10-29 LAB
ALBUMIN SERPL-MCNC: 4.1 G/DL (ref 3.5–5.2)
ALP SERPL-CCNC: 95 U/L (ref 40–129)
ALT SERPL-CCNC: 12 U/L (ref 5–41)
ANION GAP SERPL CALCULATED.3IONS-SCNC: 12 MMOL/L (ref 9–17)
AST SERPL-CCNC: 12 U/L
BASOPHILS # BLD: <0.03 K/UL (ref 0–0.2)
BASOPHILS NFR BLD: 0 % (ref 0–2)
BILIRUB SERPL-MCNC: 0.3 MG/DL (ref 0.3–1.2)
BILIRUB UR QL STRIP: ABNORMAL
BUN SERPL-MCNC: 10 MG/DL (ref 6–20)
BUN/CREAT SERPL: 11 (ref 9–20)
CALCIUM SERPL-MCNC: 9.3 MG/DL (ref 8.6–10.4)
CHLORIDE SERPL-SCNC: 102 MMOL/L (ref 98–107)
CLARITY UR: CLEAR
CO2 SERPL-SCNC: 25 MMOL/L (ref 20–31)
COLOR UR: YELLOW
CREAT SERPL-MCNC: 0.9 MG/DL (ref 0.7–1.2)
CRYSTALS URNS MICRO: ABNORMAL /HPF
EOSINOPHIL # BLD: 0.34 K/UL (ref 0–0.44)
EOSINOPHILS RELATIVE PERCENT: 5 % (ref 1–4)
EPI CELLS #/AREA URNS HPF: ABNORMAL /HPF (ref 0–5)
ERYTHROCYTE [DISTWIDTH] IN BLOOD BY AUTOMATED COUNT: 14.9 % (ref 11.8–14.4)
GFR, ESTIMATED: >90 ML/MIN/1.73M2
GLUCOSE SERPL-MCNC: 116 MG/DL (ref 70–99)
GLUCOSE UR STRIP-MCNC: NEGATIVE MG/DL
HCT VFR BLD AUTO: 35.7 % (ref 40.7–50.3)
HGB BLD-MCNC: 11.6 G/DL (ref 13–17)
HGB UR QL STRIP.AUTO: NEGATIVE
IMM GRANULOCYTES # BLD AUTO: 0.01 K/UL (ref 0–0.3)
IMM GRANULOCYTES NFR BLD: 0 %
KETONES UR STRIP-MCNC: ABNORMAL MG/DL
LEUKOCYTE ESTERASE UR QL STRIP: NEGATIVE
LYMPHOCYTES NFR BLD: 1.45 K/UL (ref 1.1–3.7)
LYMPHOCYTES RELATIVE PERCENT: 22 % (ref 24–43)
MCH RBC QN AUTO: 28 PG (ref 25.2–33.5)
MCHC RBC AUTO-ENTMCNC: 32.5 G/DL (ref 28.4–34.8)
MCV RBC AUTO: 86.2 FL (ref 82.6–102.9)
MONOCYTES NFR BLD: 0.58 K/UL (ref 0.1–1.2)
MONOCYTES NFR BLD: 9 % (ref 3–12)
MUCOUS THREADS URNS QL MICRO: ABNORMAL
NEUTROPHILS NFR BLD: 64 % (ref 36–65)
NEUTS SEG NFR BLD: 4.3 K/UL (ref 1.5–8.1)
NITRITE UR QL STRIP: NEGATIVE
NRBC BLD-RTO: 0 PER 100 WBC
PH UR STRIP: 5.5 [PH] (ref 5–8)
PLATELET # BLD AUTO: 365 K/UL (ref 138–453)
PMV BLD AUTO: 8.4 FL (ref 8.1–13.5)
POTASSIUM SERPL-SCNC: 3.8 MMOL/L (ref 3.7–5.3)
PROT SERPL-MCNC: 7.3 G/DL (ref 6.4–8.3)
PROT UR STRIP-MCNC: ABNORMAL MG/DL
RBC # BLD AUTO: 4.14 M/UL (ref 4.21–5.77)
RBC # BLD: ABNORMAL 10*6/UL
RBC #/AREA URNS HPF: ABNORMAL /HPF (ref 0–2)
SODIUM SERPL-SCNC: 139 MMOL/L (ref 135–144)
SP GR UR STRIP: 1.04 (ref 1–1.03)
UROBILINOGEN UR STRIP-ACNC: NORMAL EU/DL (ref 0–1)
WBC #/AREA URNS HPF: ABNORMAL /HPF (ref 0–5)
WBC OTHER # BLD: 6.7 K/UL (ref 3.5–11.3)

## 2024-10-29 PROCEDURE — 96413 CHEMO IV INFUSION 1 HR: CPT

## 2024-10-29 PROCEDURE — 80053 COMPREHEN METABOLIC PANEL: CPT

## 2024-10-29 PROCEDURE — 6360000002 HC RX W HCPCS: Performed by: INTERNAL MEDICINE

## 2024-10-29 PROCEDURE — 96375 TX/PRO/DX INJ NEW DRUG ADDON: CPT

## 2024-10-29 PROCEDURE — 36591 DRAW BLOOD OFF VENOUS DEVICE: CPT

## 2024-10-29 PROCEDURE — 81001 URINALYSIS AUTO W/SCOPE: CPT

## 2024-10-29 PROCEDURE — 96415 CHEMO IV INFUSION ADDL HR: CPT

## 2024-10-29 PROCEDURE — 96409 CHEMO IV PUSH SNGL DRUG: CPT

## 2024-10-29 PROCEDURE — 96368 THER/DIAG CONCURRENT INF: CPT

## 2024-10-29 PROCEDURE — 96416 CHEMO PROLONG INFUSE W/PUMP: CPT

## 2024-10-29 PROCEDURE — 2580000003 HC RX 258: Performed by: INTERNAL MEDICINE

## 2024-10-29 PROCEDURE — 96417 CHEMO IV INFUS EACH ADDL SEQ: CPT

## 2024-10-29 PROCEDURE — 85025 COMPLETE CBC W/AUTO DIFF WBC: CPT

## 2024-10-29 RX ORDER — SODIUM CHLORIDE 0.9 % (FLUSH) 0.9 %
5-40 SYRINGE (ML) INJECTION PRN
OUTPATIENT
Start: 2024-10-31

## 2024-10-29 RX ORDER — FLUOROURACIL 50 MG/ML
400 INJECTION, SOLUTION INTRAVENOUS ONCE
Status: CANCELLED | OUTPATIENT
Start: 2024-10-29 | End: 2024-10-29

## 2024-10-29 RX ORDER — MEPERIDINE HYDROCHLORIDE 50 MG/ML
12.5 INJECTION INTRAMUSCULAR; INTRAVENOUS; SUBCUTANEOUS PRN
Status: CANCELLED | OUTPATIENT
Start: 2024-10-29

## 2024-10-29 RX ORDER — SODIUM CHLORIDE 9 MG/ML
5-250 INJECTION, SOLUTION INTRAVENOUS PRN
Status: DISCONTINUED | OUTPATIENT
Start: 2024-10-29 | End: 2024-10-30 | Stop reason: HOSPADM

## 2024-10-29 RX ORDER — SODIUM CHLORIDE 9 MG/ML
INJECTION, SOLUTION INTRAVENOUS CONTINUOUS
Status: CANCELLED | OUTPATIENT
Start: 2024-10-29

## 2024-10-29 RX ORDER — SODIUM CHLORIDE 9 MG/ML
5-250 INJECTION, SOLUTION INTRAVENOUS PRN
Status: CANCELLED | OUTPATIENT
Start: 2024-10-29

## 2024-10-29 RX ORDER — PALONOSETRON 0.05 MG/ML
0.25 INJECTION, SOLUTION INTRAVENOUS ONCE
Status: CANCELLED | OUTPATIENT
Start: 2024-10-29 | End: 2024-10-29

## 2024-10-29 RX ORDER — FLUOROURACIL 50 MG/ML
400 INJECTION, SOLUTION INTRAVENOUS ONCE
Status: COMPLETED | OUTPATIENT
Start: 2024-10-29 | End: 2024-10-29

## 2024-10-29 RX ORDER — HEPARIN SODIUM (PORCINE) LOCK FLUSH IV SOLN 100 UNIT/ML 100 UNIT/ML
500 SOLUTION INTRAVENOUS PRN
Status: CANCELLED | OUTPATIENT
Start: 2024-10-29

## 2024-10-29 RX ORDER — SODIUM CHLORIDE 0.9 % (FLUSH) 0.9 %
5-40 SYRINGE (ML) INJECTION PRN
Status: DISCONTINUED | OUTPATIENT
Start: 2024-10-29 | End: 2024-10-30 | Stop reason: HOSPADM

## 2024-10-29 RX ORDER — ALBUTEROL SULFATE 90 UG/1
4 INHALANT RESPIRATORY (INHALATION) PRN
Status: CANCELLED | OUTPATIENT
Start: 2024-10-29

## 2024-10-29 RX ORDER — FAMOTIDINE 10 MG/ML
20 INJECTION, SOLUTION INTRAVENOUS
Status: CANCELLED | OUTPATIENT
Start: 2024-10-29

## 2024-10-29 RX ORDER — DEXTROSE MONOHYDRATE 50 MG/ML
5-250 INJECTION, SOLUTION INTRAVENOUS PRN
Status: CANCELLED | OUTPATIENT
Start: 2024-10-29

## 2024-10-29 RX ORDER — DEXTROSE MONOHYDRATE 50 MG/ML
5-250 INJECTION, SOLUTION INTRAVENOUS PRN
Status: DISCONTINUED | OUTPATIENT
Start: 2024-10-29 | End: 2024-10-30 | Stop reason: HOSPADM

## 2024-10-29 RX ORDER — EPINEPHRINE 1 MG/ML
0.3 INJECTION, SOLUTION, CONCENTRATE INTRAVENOUS PRN
Status: CANCELLED | OUTPATIENT
Start: 2024-10-29

## 2024-10-29 RX ORDER — HEPARIN SODIUM (PORCINE) LOCK FLUSH IV SOLN 100 UNIT/ML 100 UNIT/ML
500 SOLUTION INTRAVENOUS PRN
OUTPATIENT
Start: 2024-10-31

## 2024-10-29 RX ORDER — SODIUM CHLORIDE 0.9 % (FLUSH) 0.9 %
5-40 SYRINGE (ML) INJECTION PRN
Status: CANCELLED | OUTPATIENT
Start: 2024-10-29

## 2024-10-29 RX ORDER — PALONOSETRON 0.05 MG/ML
0.25 INJECTION, SOLUTION INTRAVENOUS ONCE
Status: COMPLETED | OUTPATIENT
Start: 2024-10-29 | End: 2024-10-29

## 2024-10-29 RX ORDER — DIPHENHYDRAMINE HYDROCHLORIDE 50 MG/ML
50 INJECTION INTRAMUSCULAR; INTRAVENOUS
Status: CANCELLED | OUTPATIENT
Start: 2024-10-29

## 2024-10-29 RX ORDER — ONDANSETRON 2 MG/ML
8 INJECTION INTRAMUSCULAR; INTRAVENOUS
Status: CANCELLED | OUTPATIENT
Start: 2024-10-29

## 2024-10-29 RX ORDER — DEXAMETHASONE SODIUM PHOSPHATE 10 MG/ML
10 INJECTION INTRAMUSCULAR; INTRAVENOUS ONCE
Status: COMPLETED | OUTPATIENT
Start: 2024-10-29 | End: 2024-10-29

## 2024-10-29 RX ORDER — SODIUM CHLORIDE 9 MG/ML
5-250 INJECTION, SOLUTION INTRAVENOUS PRN
OUTPATIENT
Start: 2024-10-31

## 2024-10-29 RX ORDER — ACETAMINOPHEN 325 MG/1
650 TABLET ORAL
Status: CANCELLED | OUTPATIENT
Start: 2024-10-29

## 2024-10-29 RX ADMIN — LEUCOVORIN CALCIUM 800 MG: 100 INJECTION, POWDER, LYOPHILIZED, FOR SUSPENSION INTRAMUSCULAR; INTRAVENOUS at 11:15

## 2024-10-29 RX ADMIN — SODIUM CHLORIDE, PRESERVATIVE FREE 10 ML: 5 INJECTION INTRAVENOUS at 08:17

## 2024-10-29 RX ADMIN — DEXTROSE MONOHYDRATE 100 ML/HR: 50 INJECTION, SOLUTION INTRAVENOUS at 11:14

## 2024-10-29 RX ADMIN — DEXAMETHASONE SODIUM PHOSPHATE 10 MG: 10 INJECTION INTRAMUSCULAR; INTRAVENOUS at 09:59

## 2024-10-29 RX ADMIN — BEVACIZUMAB-AWWB 475 MG: 400 INJECTION, SOLUTION INTRAVENOUS at 10:30

## 2024-10-29 RX ADMIN — FLUOROURACIL 850 MG: 50 INJECTION, SOLUTION INTRAVENOUS at 13:39

## 2024-10-29 RX ADMIN — SODIUM CHLORIDE 100 ML/HR: 9 INJECTION, SOLUTION INTRAVENOUS at 08:17

## 2024-10-29 RX ADMIN — OXALIPLATIN 180 MG: 5 INJECTION, SOLUTION INTRAVENOUS at 11:18

## 2024-10-29 RX ADMIN — PALONOSETRON HYDROCHLORIDE 0.25 MG: 0.25 INJECTION, SOLUTION INTRAVENOUS at 09:59

## 2024-10-29 RX ADMIN — FLUOROURACIL 5000 MG: 50 INJECTION, SOLUTION INTRAVENOUS at 13:39

## 2024-10-29 NOTE — PROGRESS NOTES
Patient Assistance    Writer met with patient to discuss MVASI copay assistance. Household and income information obtained. Consent obtained. Patient is enrolled in copay assistance.     Patient would like assistance with food; referral sent to [facility].     [facility] financial assistance application given to patient.      Patient thanked writer.

## 2024-10-29 NOTE — PROGRESS NOTES
Patient arrived ambulatory per self for cycle 1 day 1 treatment.  Patient denies complaints or concerns.  Port  accessed;specimen sent.  Labs reviewed.  Patient premedicated.  Patient watched [facility] video. Consent signed. Questions answered.  MVASI infused with no sign adverse reaction;line flushed.  Oxaliplatin ran concurrently with Leucovorin with no sign adverse reaction;line flushed.  5 FU given IV push with no sign adverse reaction.  5 FU connected to run over 46 hours. Pump settings verified by 2nd RN. Pump infusing prior to patient leaving unit.   Patient ambulated off unit per self at discharge. Printed calendar in hand.    
Quality 111:Pneumonia Vaccination Status For Older Adults: Pneumococcal Vaccination Previously Received
Quality 226: Preventive Care And Screening: Tobacco Use: Screening And Cessation Intervention: Patient screened for tobacco use and is an ex/non-smoker
Detail Level: Detailed

## 2024-10-31 ENCOUNTER — HOSPITAL ENCOUNTER (OUTPATIENT)
Dept: INFUSION THERAPY | Facility: MEDICAL CENTER | Age: 44
Discharge: HOME | End: 2024-10-31
Payer: COMMERCIAL

## 2024-10-31 ENCOUNTER — OFFICE VISIT (OUTPATIENT)
Dept: ONCOLOGY | Age: 44
End: 2024-10-31
Payer: COMMERCIAL

## 2024-10-31 ENCOUNTER — TELEPHONE (OUTPATIENT)
Dept: ONCOLOGY | Age: 44
End: 2024-10-31

## 2024-10-31 VITALS
HEART RATE: 62 BPM | SYSTOLIC BLOOD PRESSURE: 109 MMHG | RESPIRATION RATE: 18 BRPM | TEMPERATURE: 98.2 F | DIASTOLIC BLOOD PRESSURE: 73 MMHG

## 2024-10-31 VITALS
HEART RATE: 57 BPM | BODY MASS INDEX: 34.86 KG/M2 | SYSTOLIC BLOOD PRESSURE: 115 MMHG | TEMPERATURE: 97.4 F | WEIGHT: 216 LBS | RESPIRATION RATE: 16 BRPM | DIASTOLIC BLOOD PRESSURE: 71 MMHG

## 2024-10-31 DIAGNOSIS — R59.0 LYMPHADENOPATHY, RETROPERITONEAL: ICD-10-CM

## 2024-10-31 DIAGNOSIS — D64.9 NORMOCYTIC ANEMIA: ICD-10-CM

## 2024-10-31 DIAGNOSIS — C18.7 CANCER OF SIGMOID (HCC): Primary | ICD-10-CM

## 2024-10-31 DIAGNOSIS — C77.1 METASTASIS TO MEDIASTINAL LYMPH NODE (HCC): ICD-10-CM

## 2024-10-31 PROCEDURE — 2580000003 HC RX 258: Performed by: INTERNAL MEDICINE

## 2024-10-31 PROCEDURE — 99211 OFF/OP EST MAY X REQ PHY/QHP: CPT | Performed by: INTERNAL MEDICINE

## 2024-10-31 PROCEDURE — 6360000002 HC RX W HCPCS: Performed by: INTERNAL MEDICINE

## 2024-10-31 PROCEDURE — 96523 IRRIG DRUG DELIVERY DEVICE: CPT

## 2024-10-31 PROCEDURE — 99214 OFFICE O/P EST MOD 30 MIN: CPT | Performed by: INTERNAL MEDICINE

## 2024-10-31 RX ORDER — SODIUM CHLORIDE 9 MG/ML
5-250 INJECTION, SOLUTION INTRAVENOUS PRN
OUTPATIENT
Start: 2024-11-12

## 2024-10-31 RX ORDER — PALONOSETRON 0.05 MG/ML
0.25 INJECTION, SOLUTION INTRAVENOUS ONCE
OUTPATIENT
Start: 2024-11-12 | End: 2024-11-12

## 2024-10-31 RX ORDER — FAMOTIDINE 10 MG/ML
20 INJECTION, SOLUTION INTRAVENOUS
OUTPATIENT
Start: 2024-11-12

## 2024-10-31 RX ORDER — ALBUTEROL SULFATE 90 UG/1
4 INHALANT RESPIRATORY (INHALATION) PRN
OUTPATIENT
Start: 2024-11-12

## 2024-10-31 RX ORDER — DEXTROSE MONOHYDRATE 50 MG/ML
5-250 INJECTION, SOLUTION INTRAVENOUS PRN
OUTPATIENT
Start: 2024-11-12

## 2024-10-31 RX ORDER — SODIUM CHLORIDE 9 MG/ML
INJECTION, SOLUTION INTRAVENOUS CONTINUOUS
OUTPATIENT
Start: 2024-11-12

## 2024-10-31 RX ORDER — SODIUM CHLORIDE 9 MG/ML
5-250 INJECTION, SOLUTION INTRAVENOUS PRN
OUTPATIENT
Start: 2024-11-14

## 2024-10-31 RX ORDER — SODIUM CHLORIDE 0.9 % (FLUSH) 0.9 %
5-40 SYRINGE (ML) INJECTION PRN
OUTPATIENT
Start: 2024-11-12

## 2024-10-31 RX ORDER — HEPARIN SODIUM (PORCINE) LOCK FLUSH IV SOLN 100 UNIT/ML 100 UNIT/ML
500 SOLUTION INTRAVENOUS PRN
OUTPATIENT
Start: 2024-11-14

## 2024-10-31 RX ORDER — ONDANSETRON 2 MG/ML
8 INJECTION INTRAMUSCULAR; INTRAVENOUS
OUTPATIENT
Start: 2024-11-12

## 2024-10-31 RX ORDER — HEPARIN 100 UNIT/ML
500 SYRINGE INTRAVENOUS PRN
Status: DISPENSED | OUTPATIENT
Start: 2024-10-31 | End: 2024-10-31

## 2024-10-31 RX ORDER — SODIUM CHLORIDE 0.9 % (FLUSH) 0.9 %
5-40 SYRINGE (ML) INJECTION PRN
OUTPATIENT
Start: 2024-11-14

## 2024-10-31 RX ORDER — DIPHENHYDRAMINE HYDROCHLORIDE 50 MG/ML
50 INJECTION INTRAMUSCULAR; INTRAVENOUS
OUTPATIENT
Start: 2024-11-12

## 2024-10-31 RX ORDER — MEPERIDINE HYDROCHLORIDE 50 MG/ML
12.5 INJECTION INTRAMUSCULAR; INTRAVENOUS; SUBCUTANEOUS PRN
OUTPATIENT
Start: 2024-11-12

## 2024-10-31 RX ORDER — SODIUM CHLORIDE 0.9 % (FLUSH) 0.9 %
5-40 SYRINGE (ML) INJECTION PRN
Status: ACTIVE | OUTPATIENT
Start: 2024-10-31 | End: 2024-10-31

## 2024-10-31 RX ORDER — ACETAMINOPHEN 325 MG/1
650 TABLET ORAL
OUTPATIENT
Start: 2024-11-12

## 2024-10-31 RX ORDER — HEPARIN SODIUM (PORCINE) LOCK FLUSH IV SOLN 100 UNIT/ML 100 UNIT/ML
500 SOLUTION INTRAVENOUS PRN
OUTPATIENT
Start: 2024-11-12

## 2024-10-31 RX ORDER — FLUOROURACIL 50 MG/ML
400 INJECTION, SOLUTION INTRAVENOUS ONCE
OUTPATIENT
Start: 2024-11-12 | End: 2024-11-12

## 2024-10-31 RX ORDER — EPINEPHRINE 1 MG/ML
0.3 INJECTION, SOLUTION, CONCENTRATE INTRAVENOUS PRN
OUTPATIENT
Start: 2024-11-12

## 2024-10-31 RX ADMIN — HEPARIN 500 UNITS: 100 SYRINGE at 12:14

## 2024-10-31 RX ADMIN — SODIUM CHLORIDE, PRESERVATIVE FREE 10 ML: 5 INJECTION INTRAVENOUS at 12:14

## 2024-10-31 NOTE — PROGRESS NOTES
Description   A.  \"MARCELO DENIS, PERITONEUM MASS\" Received in formalin is a 4.5 x   4.0 x 1.5 cm aggregate of yellow-red, lobulated, fatty to fibrous   tissue.  Sectioning reveals bright yellow to fibrotic cut surfaces.   Totally embedded 4c.   B.  \"MARCELO DENIS, UMBILICAL MASS\" Received in formalin are two   fragments of yellow, lobulated to fibrotic tissue fragments, 1.7 cm   and 2.0 cm.  Sectioning reveals tan, rubbery, fibrotic to lobulated   and fatty cut surfaces.  Totally embedded 2c.  tm     Prachi Ferrer/se:9/20/2024   Microscopic Description   A, B. Microscopic examination performed. Immunostains were performed   with appropriate controls on block B2. Neoplastic cells are positive   for CK20 (weak, focal), CDX2, and SATB2.     Block for additional testing: B2         Processing Lab:  [address] [address]   Interpretation Performed at [address] [phone]             IMAGING DATA:      IR PORT PLACEMENT > 5 YEARS  Narrative: PROCEDURE:  ULTRASOUND GUIDED VASCULAR ACCESS.  FLUOROSCOPY GUIDED PLACEMENT OF A SUBCUTANEOUS PORT-A-CATH.    MODERATE CONSCIOUS SEDATION    10/22/2024.    HISTORY:  ORDERING SYSTEM PROVIDED HISTORY: Carcinoma of sigmoid colon (HCC)    SEDATION:  Moderate sedation was ordered and supervised by the attending with physician  face-to-face monitoring. Medications were provided and recorded by Radiology  nurses.    FLUOROSCOPY DOSE AND TYPE:    Radiation Exposure Index: Kerma mGy, 0.8    TECHNIQUE:  Informed consent was obtained after a detailed explanation of the procedure  including risks, benefits, and alternatives. All aspects of maximum sterile  barrier technique were used including washing hands with conventional soap  and water or with alcohol-based hand rubs (ABHR), skin preparation, cap,  mask, sterile gown, sterile gloves, and sterile full body drape. Local  anesthesia was

## 2024-10-31 NOTE — PROGRESS NOTES
Patient arrived ambulatory per self for pump removal.  Patient denies complaints or concerns. He had no issues with pump.  Pump empty.  Patient states he applied Emla Cream under dressing before coming in today. Emla Cream visible under dressing.  Patient instructed that cream should only be applied prior to chemotherapy treatment before port is accessed. Patient instructed he should not peel back dressing and apply cream. Patient voices understanding.  Patient states he was not aware that he would have pump applied with each treatment. He also thought it was a heart monitor. Patient educated that this pump is infusing a chemotherapy drug and it will be applied at the end of each chemotherapy treatment. Patient voices understanding.  Pump removed.  Port flushed and heparinized with intact sprague needle removed per protocol.  Patient reminded to stay for 2:15 physician appointment.  Patient ambulated back out to waiting room.

## 2024-10-31 NOTE — TELEPHONE ENCOUNTER
MARCELO HERE FOR FOLLOW UP   Rtc in 2 weeks  Follow-up on [facility] it was done 3 weeks ago and no result  MD VISIT: 11/14/24 @ 8:30AM   MAGDY ROMAN NOTIFIED ON [facility]   AVS PRINTED AND GIVEN ON EXIT

## 2024-11-08 LAB
DNA RANGE(S) EXAMINED NAR: NORMAL
GENE DIS ANL INTERP-IMP: NORMAL
GENE DIS ASSESSED: NORMAL
PD-L1 BY 22C3 TISS IMSTN DOC: NEGATIVE
REASON FOR STUDY: NORMAL
TEMPUS PD-L1 (22C3) COMBINED POSITIVE SCORE: <1
TEMPUS PD-L1 (22C3) TUMOR PROPORTION SCORE: <1 %
TEMPUS PORTAL: NORMAL

## 2024-11-11 ENCOUNTER — TELEPHONE (OUTPATIENT)
Dept: INFUSION THERAPY | Facility: MEDICAL CENTER | Age: 44
End: 2024-11-11

## 2024-11-11 NOTE — TELEPHONE ENCOUNTER
This [facility] email is regarding PT Yuri Phillips 1980 as the samples from case# [id number], A1 were rejected due to insufficient material to proceed to sequencing.  If there is an alternate tissue sample available that you would like tested please let us know and we will happily request it!     Email above from Lilli at [facility]    Writer called [phone] and spoke with Lizeth in Path and no other block available CASE # [id number].    Message to Lilli and note to md  for Dr Sanchez, fax note to triage.

## 2024-11-12 ENCOUNTER — HOSPITAL ENCOUNTER (OUTPATIENT)
Facility: MEDICAL CENTER | Age: 44
End: 2024-11-12
Payer: COMMERCIAL

## 2024-11-12 ENCOUNTER — HOSPITAL ENCOUNTER (OUTPATIENT)
Dept: INFUSION THERAPY | Facility: MEDICAL CENTER | Age: 44
Discharge: HOME | End: 2024-11-12
Payer: COMMERCIAL

## 2024-11-12 VITALS
TEMPERATURE: 98.6 F | SYSTOLIC BLOOD PRESSURE: 127 MMHG | HEART RATE: 67 BPM | RESPIRATION RATE: 16 BRPM | DIASTOLIC BLOOD PRESSURE: 83 MMHG

## 2024-11-12 DIAGNOSIS — C18.7 CANCER OF SIGMOID (HCC): Primary | ICD-10-CM

## 2024-11-12 LAB
ALBUMIN SERPL-MCNC: 4.1 G/DL (ref 3.5–5.2)
ALBUMIN/GLOB SERPL: 1.3 {RATIO} (ref 1–2.5)
ALP SERPL-CCNC: 93 U/L (ref 40–129)
ALT SERPL-CCNC: 9 U/L (ref 10–50)
ANION GAP SERPL CALCULATED.3IONS-SCNC: 9 MMOL/L (ref 9–16)
AST SERPL-CCNC: 14 U/L (ref 10–50)
BASOPHILS # BLD: <0.03 K/UL (ref 0–0.2)
BASOPHILS NFR BLD: 0 % (ref 0–2)
BILIRUB SERPL-MCNC: 0.2 MG/DL (ref 0–1.2)
BILIRUB UR QL STRIP: NEGATIVE
BUN SERPL-MCNC: 13 MG/DL (ref 6–20)
CALCIUM SERPL-MCNC: 9 MG/DL (ref 8.6–10.4)
CHLORIDE SERPL-SCNC: 107 MMOL/L (ref 98–107)
CLARITY UR: CLEAR
CO2 SERPL-SCNC: 27 MMOL/L (ref 20–31)
COLOR UR: YELLOW
COMMENT: ABNORMAL
CREAT SERPL-MCNC: 1 MG/DL (ref 0.7–1.2)
EOSINOPHIL # BLD: 0.34 K/UL (ref 0–0.44)
EOSINOPHILS RELATIVE PERCENT: 7 % (ref 1–4)
ERYTHROCYTE [DISTWIDTH] IN BLOOD BY AUTOMATED COUNT: 14.5 % (ref 11.8–14.4)
GFR, ESTIMATED: >90 ML/MIN/1.73M2
GLUCOSE SERPL-MCNC: 111 MG/DL (ref 74–99)
GLUCOSE UR STRIP-MCNC: NEGATIVE MG/DL
HCT VFR BLD AUTO: 35.5 % (ref 40.7–50.3)
HGB BLD-MCNC: 11.9 G/DL (ref 13–17)
HGB UR QL STRIP.AUTO: NEGATIVE
IMM GRANULOCYTES # BLD AUTO: 0 K/UL (ref 0–0.3)
IMM GRANULOCYTES NFR BLD: 0 %
KETONES UR STRIP-MCNC: NEGATIVE MG/DL
LEUKOCYTE ESTERASE UR QL STRIP: NEGATIVE
LYMPHOCYTES NFR BLD: 1.39 K/UL (ref 1.1–3.7)
LYMPHOCYTES RELATIVE PERCENT: 27 % (ref 24–43)
MCH RBC QN AUTO: 28.7 PG (ref 25.2–33.5)
MCHC RBC AUTO-ENTMCNC: 33.5 G/DL (ref 28.4–34.8)
MCV RBC AUTO: 85.5 FL (ref 82.6–102.9)
MONOCYTES NFR BLD: 0.54 K/UL (ref 0.1–1.2)
MONOCYTES NFR BLD: 10 % (ref 3–12)
NEUTROPHILS NFR BLD: 56 % (ref 36–65)
NEUTS SEG NFR BLD: 2.9 K/UL (ref 1.5–8.1)
NITRITE UR QL STRIP: NEGATIVE
NRBC BLD-RTO: 0 PER 100 WBC
PH UR STRIP: 6 [PH] (ref 5–8)
PLATELET # BLD AUTO: 408 K/UL (ref 138–453)
PMV BLD AUTO: 8.1 FL (ref 8.1–13.5)
POTASSIUM SERPL-SCNC: 4.2 MMOL/L (ref 3.7–5.3)
PROT SERPL-MCNC: 7.2 G/DL (ref 6.6–8.7)
PROT UR STRIP-MCNC: NEGATIVE MG/DL
RBC # BLD AUTO: 4.15 M/UL (ref 4.21–5.77)
RBC # BLD: ABNORMAL 10*6/UL
SODIUM SERPL-SCNC: 143 MMOL/L (ref 136–145)
SP GR UR STRIP: 1.04 (ref 1–1.03)
UROBILINOGEN UR STRIP-ACNC: NORMAL EU/DL (ref 0–1)
WBC OTHER # BLD: 5.2 K/UL (ref 3.5–11.3)

## 2024-11-12 PROCEDURE — 96409 CHEMO IV PUSH SNGL DRUG: CPT

## 2024-11-12 PROCEDURE — 80053 COMPREHEN METABOLIC PANEL: CPT

## 2024-11-12 PROCEDURE — 96411 CHEMO IV PUSH ADDL DRUG: CPT

## 2024-11-12 PROCEDURE — 36591 DRAW BLOOD OFF VENOUS DEVICE: CPT

## 2024-11-12 PROCEDURE — 2580000003 HC RX 258: Performed by: INTERNAL MEDICINE

## 2024-11-12 PROCEDURE — 96417 CHEMO IV INFUS EACH ADDL SEQ: CPT

## 2024-11-12 PROCEDURE — 96416 CHEMO PROLONG INFUSE W/PUMP: CPT

## 2024-11-12 PROCEDURE — 81003 URINALYSIS AUTO W/O SCOPE: CPT

## 2024-11-12 PROCEDURE — 85025 COMPLETE CBC W/AUTO DIFF WBC: CPT

## 2024-11-12 PROCEDURE — 96413 CHEMO IV INFUSION 1 HR: CPT

## 2024-11-12 PROCEDURE — 6360000002 HC RX W HCPCS: Performed by: INTERNAL MEDICINE

## 2024-11-12 PROCEDURE — 96415 CHEMO IV INFUSION ADDL HR: CPT

## 2024-11-12 PROCEDURE — 96368 THER/DIAG CONCURRENT INF: CPT

## 2024-11-12 PROCEDURE — 96375 TX/PRO/DX INJ NEW DRUG ADDON: CPT

## 2024-11-12 RX ORDER — SODIUM CHLORIDE 9 MG/ML
5-250 INJECTION, SOLUTION INTRAVENOUS PRN
Status: DISCONTINUED | OUTPATIENT
Start: 2024-11-12 | End: 2024-11-13 | Stop reason: HOSPADM

## 2024-11-12 RX ORDER — HEPARIN 100 UNIT/ML
500 SYRINGE INTRAVENOUS PRN
Status: DISCONTINUED | OUTPATIENT
Start: 2024-11-12 | End: 2024-11-13 | Stop reason: HOSPADM

## 2024-11-12 RX ORDER — DEXAMETHASONE SODIUM PHOSPHATE 10 MG/ML
10 INJECTION INTRAMUSCULAR; INTRAVENOUS ONCE
Status: COMPLETED | OUTPATIENT
Start: 2024-11-12 | End: 2024-11-12

## 2024-11-12 RX ORDER — PALONOSETRON 0.05 MG/ML
0.25 INJECTION, SOLUTION INTRAVENOUS ONCE
Status: COMPLETED | OUTPATIENT
Start: 2024-11-12 | End: 2024-11-12

## 2024-11-12 RX ORDER — DEXTROSE MONOHYDRATE 50 MG/ML
5-250 INJECTION, SOLUTION INTRAVENOUS PRN
Status: DISCONTINUED | OUTPATIENT
Start: 2024-11-12 | End: 2024-11-13 | Stop reason: HOSPADM

## 2024-11-12 RX ORDER — SODIUM CHLORIDE 0.9 % (FLUSH) 0.9 %
5-40 SYRINGE (ML) INJECTION PRN
Status: DISCONTINUED | OUTPATIENT
Start: 2024-11-12 | End: 2024-11-13 | Stop reason: HOSPADM

## 2024-11-12 RX ORDER — FLUOROURACIL 50 MG/ML
400 INJECTION, SOLUTION INTRAVENOUS ONCE
Status: COMPLETED | OUTPATIENT
Start: 2024-11-12 | End: 2024-11-12

## 2024-11-12 RX ADMIN — FLUOROURACIL 850 MG: 50 INJECTION, SOLUTION INTRAVENOUS at 12:50

## 2024-11-12 RX ADMIN — SODIUM CHLORIDE 20 ML/HR: 9 INJECTION, SOLUTION INTRAVENOUS at 08:37

## 2024-11-12 RX ADMIN — OXALIPLATIN 180 MG: 5 INJECTION, SOLUTION, CONCENTRATE INTRAVENOUS at 10:37

## 2024-11-12 RX ADMIN — FLUOROURACIL 5000 MG: 50 INJECTION, SOLUTION INTRAVENOUS at 12:50

## 2024-11-12 RX ADMIN — DEXAMETHASONE SODIUM PHOSPHATE 10 MG: 10 INJECTION INTRAMUSCULAR; INTRAVENOUS at 09:11

## 2024-11-12 RX ADMIN — SODIUM CHLORIDE, PRESERVATIVE FREE 10 ML: 5 INJECTION INTRAVENOUS at 08:37

## 2024-11-12 RX ADMIN — DEXTROSE MONOHYDRATE 100 ML/HR: 50 INJECTION, SOLUTION INTRAVENOUS at 10:25

## 2024-11-12 RX ADMIN — LEUCOVORIN CALCIUM 800 MG: 350 INJECTION, POWDER, LYOPHILIZED, FOR SUSPENSION INTRAMUSCULAR; INTRAVENOUS at 10:35

## 2024-11-12 RX ADMIN — BEVACIZUMAB-AWWB 475 MG: 400 INJECTION, SOLUTION INTRAVENOUS at 09:40

## 2024-11-12 RX ADMIN — PALONOSETRON HYDROCHLORIDE 0.25 MG: 0.25 INJECTION INTRAVENOUS at 09:11

## 2024-11-12 NOTE — PROGRESS NOTES
Patient arrived ambulatory per self for cycle 2 day 1 treatment.  Patient denies complaints or concerns.  Port accessed;specimen sent.  Labs reviewed.  Patient premedicated.  MVASI infused with no sign adverse reaction;line flushed.  Oxaliplatin ran concurrently with Leucovorin with no sign adverse reaction;line flushed.  5 FU given IV push with no sign adverse reaction.  5 FU pump connected to infuse over 46 hours. Pump settings verified by 2nd RN. Pump infusing prior to patient leaving unit.  Patient ambulated off unit per self at discharge. Printed calendar in hand.

## 2024-11-14 ENCOUNTER — TELEPHONE (OUTPATIENT)
Dept: ONCOLOGY | Age: 44
End: 2024-11-14

## 2024-11-14 ENCOUNTER — HOSPITAL ENCOUNTER (OUTPATIENT)
Facility: MEDICAL CENTER | Age: 44
End: 2024-11-14
Payer: COMMERCIAL

## 2024-11-14 ENCOUNTER — TELEPHONE (OUTPATIENT)
Dept: INFUSION THERAPY | Facility: MEDICAL CENTER | Age: 44
End: 2024-11-14

## 2024-11-14 ENCOUNTER — HOSPITAL ENCOUNTER (OUTPATIENT)
Dept: INFUSION THERAPY | Facility: MEDICAL CENTER | Age: 44
Discharge: HOME | End: 2024-11-14
Payer: COMMERCIAL

## 2024-11-14 ENCOUNTER — OFFICE VISIT (OUTPATIENT)
Dept: ONCOLOGY | Age: 44
End: 2024-11-14
Payer: COMMERCIAL

## 2024-11-14 VITALS
TEMPERATURE: 97.7 F | DIASTOLIC BLOOD PRESSURE: 76 MMHG | RESPIRATION RATE: 16 BRPM | HEART RATE: 64 BPM | BODY MASS INDEX: 33.57 KG/M2 | WEIGHT: 208 LBS | SYSTOLIC BLOOD PRESSURE: 112 MMHG

## 2024-11-14 DIAGNOSIS — C18.7 CANCER OF SIGMOID (HCC): Primary | ICD-10-CM

## 2024-11-14 DIAGNOSIS — D64.9 NORMOCYTIC ANEMIA: ICD-10-CM

## 2024-11-14 LAB
DNA RANGE(S) EXAMINED NAR: NORMAL
GENE DIS ANL INTERP-IMP: POSITIVE
GENE DIS ASSESSED: NORMAL
REASON FOR STUDY: NORMAL
TEMPUS BLOOD TUMOR MUTATIONAL BURDEN: 4.3 M/MB
TEMPUS LCA: NORMAL
TEMPUS MSI NOTE: NORMAL
TEMPUS PORTAL: NORMAL
TEMPUS TREATMENT IMPLICATIONS NOTE: NORMAL
TEMPUS TRIALCOUNT: 3
TEMPUS TRIALMATCHES1: NORMAL
TEMPUS TRIALMATCHES2: NORMAL
TEMPUS TRIALMATCHES3: NORMAL
TEMPUS VUS NOTE: NORMAL

## 2024-11-14 PROCEDURE — 6360000002 HC RX W HCPCS: Performed by: INTERNAL MEDICINE

## 2024-11-14 PROCEDURE — 96523 IRRIG DRUG DELIVERY DEVICE: CPT

## 2024-11-14 PROCEDURE — 99214 OFFICE O/P EST MOD 30 MIN: CPT | Performed by: INTERNAL MEDICINE

## 2024-11-14 PROCEDURE — 99211 OFF/OP EST MAY X REQ PHY/QHP: CPT | Performed by: INTERNAL MEDICINE

## 2024-11-14 PROCEDURE — 2580000003 HC RX 258: Performed by: INTERNAL MEDICINE

## 2024-11-14 RX ORDER — SODIUM CHLORIDE 0.9 % (FLUSH) 0.9 %
5-40 SYRINGE (ML) INJECTION PRN
Status: DISCONTINUED | OUTPATIENT
Start: 2024-11-14 | End: 2024-11-15 | Stop reason: HOSPADM

## 2024-11-14 RX ORDER — HEPARIN 100 UNIT/ML
500 SYRINGE INTRAVENOUS PRN
Status: DISCONTINUED | OUTPATIENT
Start: 2024-11-14 | End: 2024-11-15 | Stop reason: HOSPADM

## 2024-11-14 RX ADMIN — SODIUM CHLORIDE, PRESERVATIVE FREE 10 ML: 5 INJECTION INTRAVENOUS at 10:29

## 2024-11-14 RX ADMIN — HEPARIN 500 UNITS: 100 SYRINGE at 10:29

## 2024-11-14 NOTE — TELEPHONE ENCOUNTER
Received email from Haley LARA At [facility] stating patient rescheduled genetic counseling appointment for 11/15/24.

## 2024-11-14 NOTE — PROGRESS NOTES
Patient arrives at tx area for pump removal following MD visit  Patient denies complaints or concerns.   MD note reviewed  Denies issues with pump.  Pump removed;medication cartridge empty  Port flushed and heparinized with intact sprague needle removed per protocol.  Pt discharged   AVS per

## 2024-11-14 NOTE — PROGRESS NOTES
Family History   Problem Relation Age of Onset    Diabetes Mother     High Blood Pressure Father     Diabetes Sister     Cancer Neg Hx     Migraines Neg Hx     Seizures Neg Hx         REVIEW OF SYSTEM:     Constitutional: No fever or chills. No night sweats, no weight loss   Eyes: No eye discharge, double vision, or eye pain   HEENT: negative for sore mouth, sore throat, hoarseness and voice change   Respiratory: negative for cough , sputum, dyspnea, wheezing, hemoptysis, chest pain   Cardiovascular: negative for chest pain, dyspnea, palpitations, orthopnea, PND   Gastrointestinal: negative for nausea, vomiting, diarrhea, constipation, abdominal pain, Dysphagia, hematemesis and hematochezia   Genitourinary: negative for frequency, dysuria, nocturia, urinary incontinence, and hematuria   Integument: negative for rash, skin lesions, bruises.   Hematologic/Lymphatic: negative for easy bruising, bleeding, lymphadenopathy, petechiae and swelling/edema   Endocrine: negative for heat or cold intolerance, tremor, weight changes, change in bowel habits and hair loss   Musculoskeletal: negative for myalgias, arthralgias, pain, joint swelling,and bone pain   Neurological: negative for headaches, dizziness, seizures, weakness, numbness       OBJECTIVE:         Vitals:    11/14/24 0925   BP: 112/76   Pulse: 64   Resp: 16   Temp: 97.7 °F (36.5 °C)         PHYSICAL EXAM:   General appearance - well appearing, no in pain or distress   Mental status - alert and cooperative   Eyes - pupils equal and reactive, extraocular eye movements intact   Ears - bilateral TM's and external ear canals normal   Mouth - mucous membranes moist, pharynx normal without lesions   Neck - supple, no significant adenopathy   Lymphatics - no palpable lymphadenopathy, no hepatosplenomegaly   Chest - clear to auscultation, no wheezes, rales or rhonchi, symmetric air entry   Heart - normal rate, regular rhythm, normal S1, S2, no murmurs, rubs, clicks or

## 2024-11-14 NOTE — TELEPHONE ENCOUNTER
Specimen sent at first [facility] testing was inadequate    Per Dr Sanchez requests other specimen available to be sent to [facility] for testing and assisted writer to place order per [facility].    Also note sent to Lilli for assistance with [facility] testing on pt's specimen that was obtained on 9/20/24 [id number].

## 2024-11-14 NOTE — TELEPHONE ENCOUNTER
MARCELO HERE FOR FOLLOW UP & TX  request [facility] to be done on [id number]   Rtc in 3 weeks for chemo(skip holiday week)  RN JESSIE TALKED W/ DR. VELÁSQUEZ REGARDING [facility]   MD VISIT: 12/5/24 @ 9:30AM PUMP OFF @ 10:30AM   AVS PRINTED AND GIVEN ON EXIT

## 2024-11-15 ENCOUNTER — HOSPITAL ENCOUNTER (OUTPATIENT)
Age: 44
Setting detail: SPECIMEN
Discharge: HOME | End: 2024-11-15

## 2024-11-18 ENCOUNTER — TELEPHONE (OUTPATIENT)
Dept: ONCOLOGY | Age: 44
End: 2024-11-18

## 2024-11-18 NOTE — TELEPHONE ENCOUNTER
Patient completed genetic counseling appointment with [facility] on 11/15/24. Progress note uploaded and attached in Media. Patient meets criteria and elects to proceed with genetic testing. Sample is on hold at [facility]. Submitted order to [facility] to process sample.

## 2024-11-20 ENCOUNTER — TELEPHONE (OUTPATIENT)
Dept: ONCOLOGY | Age: 44
End: 2024-11-20

## 2024-11-20 DIAGNOSIS — C18.7 CANCER OF SIGMOID (HCC): Primary | ICD-10-CM

## 2024-11-20 NOTE — TELEPHONE ENCOUNTER
Name: Yuri Phillips  : 1980  MRN: [id number]    Oncology Navigation Follow-Up Note    Contact Type:  Telephone    Notes: Writer called pt to check on him and to see how his tx's are going. Pt states overall everything is going well. Writer encourages pt to continue staying hydrated. Pt verbalizes understanding. Pt appreciative of check in call. Will continue to follow.      Electronically signed by Jovanna Carter RN on 2024 at 11:59 AM

## 2024-11-21 LAB — TEMPUS PORTAL: NORMAL

## 2024-11-25 ENCOUNTER — TELEPHONE (OUTPATIENT)
Dept: ONCOLOGY | Age: 44
End: 2024-11-25

## 2024-11-25 ENCOUNTER — CLINICAL DOCUMENTATION (OUTPATIENT)
Dept: ONCOLOGY | Age: 44
End: 2024-11-25

## 2024-11-25 NOTE — TELEPHONE ENCOUNTER
Called pt after receiving his bio. Pt expressed issues with getting his colostomy supplies. Pt paid a balance that was due on his account and still has not received his box of supplies for November. Contacted [facility] to see if  could assist in any way. Spoke with Juana and she informed SW that pt paid his balance but did not re-order his supplies. Juana re-ordered pt supplies (whatever his insurance would cover) and the supplies will be shipped out today. CAITLIN called pt and gave him the update. Pt verbalized understanding.

## 2024-11-25 NOTE — PROGRESS NOTES
[facility] Oncology Nutrition Screen:    PG-SGA screening form reviewed. Score = 5. Pt reports unintentional weight loss, no appetite/no interest in eating, early satiety, and consuming less than normal amounts of normal food. RD referral/nutrition evaluation indicated for scores > 4.   RD assessment to follow.

## 2024-11-26 LAB — SURGICAL PATHOLOGY REPORT: NORMAL

## 2024-12-02 ENCOUNTER — HOSPITAL ENCOUNTER (OUTPATIENT)
Facility: MEDICAL CENTER | Age: 44
End: 2024-12-02
Payer: COMMERCIAL

## 2024-12-02 ENCOUNTER — TELEPHONE (OUTPATIENT)
Dept: ONCOLOGY | Age: 44
End: 2024-12-02

## 2024-12-02 NOTE — TELEPHONE ENCOUNTER
[facility]- MEDICAL NUTRITION THERAPY     Visit Type: Initial  Reason for Assessment: Positive Nutrition Screen Score = 5. Pt reports unintentional weight loss, no appetite/no interest in eating, early satiety, and consuming less than normal amounts of normal food     NUTRITION RECOMMENDATIONS / MONITORING / EVALUATION  Encouraged small frequent meal intake as tolerated.   Continue to use Ensure for supplementation.   Will monitor PO tolerance, adequacy of intake, weight, and care plans.     Subjective/Current Data:  Yuri Phillips is a 44 y.o. male with sigmoid cancer, ex lap with resection of multiple peritoneal masses, end colostomy, chemotherapy initiated 10/29/24.   Chart reviewed and called patient for initial evaluation. Pt reports he is tolerating diet well at present; no c/o abdominal pain, nausea, or vomiting. States he is trying to eat a well balanced /nutrient dense diet. States he  occasionally eats dark chocolate covered nuts, but limits how often and amount. Typically eats 2 small meals/day (\"1/2 plate\"); reports he doesn't want to overdo it. Drinks a lot of water. Uses Ensure High Protein as needed. Pt reports some weight loss over past several months which started out as intentional. States current weight is 205 lbs. Pt states he tries to stay active to maintain strength/muscle.     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

## 2024-12-03 ENCOUNTER — HOSPITAL ENCOUNTER (OUTPATIENT)
Dept: INFUSION THERAPY | Facility: MEDICAL CENTER | Age: 44
Discharge: HOME | End: 2024-12-03
Payer: COMMERCIAL

## 2024-12-03 VITALS
HEART RATE: 83 BPM | WEIGHT: 206.5 LBS | SYSTOLIC BLOOD PRESSURE: 121 MMHG | TEMPERATURE: 98.3 F | DIASTOLIC BLOOD PRESSURE: 82 MMHG | RESPIRATION RATE: 16 BRPM | BODY MASS INDEX: 33.33 KG/M2

## 2024-12-03 DIAGNOSIS — C18.7 CANCER OF SIGMOID (HCC): Primary | ICD-10-CM

## 2024-12-03 LAB
ALBUMIN SERPL-MCNC: 4.1 G/DL (ref 3.5–5.2)
ALBUMIN/GLOB SERPL: 1.2 {RATIO} (ref 1–2.5)
ALP SERPL-CCNC: 103 U/L (ref 40–129)
ALT SERPL-CCNC: 8 U/L (ref 10–50)
ANION GAP SERPL CALCULATED.3IONS-SCNC: 9 MMOL/L (ref 9–16)
AST SERPL-CCNC: 17 U/L (ref 10–50)
BASOPHILS # BLD: 0.03 K/UL (ref 0–0.2)
BASOPHILS NFR BLD: 1 % (ref 0–2)
BILIRUB SERPL-MCNC: <0.2 MG/DL (ref 0–1.2)
BILIRUB UR QL STRIP: NEGATIVE
BUN SERPL-MCNC: 14 MG/DL (ref 6–20)
CALCIUM SERPL-MCNC: 9.6 MG/DL (ref 8.6–10.4)
CHLORIDE SERPL-SCNC: 103 MMOL/L (ref 98–107)
CLARITY UR: CLEAR
CO2 SERPL-SCNC: 28 MMOL/L (ref 20–31)
COLOR UR: YELLOW
COMMENT: NORMAL
CREAT SERPL-MCNC: 1.1 MG/DL (ref 0.7–1.2)
EOSINOPHIL # BLD: 0.09 K/UL (ref 0–0.44)
EOSINOPHILS RELATIVE PERCENT: 2 % (ref 1–4)
ERYTHROCYTE [DISTWIDTH] IN BLOOD BY AUTOMATED COUNT: 14.7 % (ref 11.8–14.4)
GFR, ESTIMATED: 82 ML/MIN/1.73M2
GLUCOSE SERPL-MCNC: 108 MG/DL (ref 74–99)
GLUCOSE UR STRIP-MCNC: NEGATIVE MG/DL
HCT VFR BLD AUTO: 37.8 % (ref 40.7–50.3)
HGB BLD-MCNC: 12.6 G/DL (ref 13–17)
HGB UR QL STRIP.AUTO: NEGATIVE
IMM GRANULOCYTES # BLD AUTO: 0.01 K/UL (ref 0–0.3)
IMM GRANULOCYTES NFR BLD: 0 %
KETONES UR STRIP-MCNC: NEGATIVE MG/DL
LEUKOCYTE ESTERASE UR QL STRIP: NEGATIVE
LYMPHOCYTES NFR BLD: 1.87 K/UL (ref 1.1–3.7)
LYMPHOCYTES RELATIVE PERCENT: 32 % (ref 24–43)
MCH RBC QN AUTO: 28.3 PG (ref 25.2–33.5)
MCHC RBC AUTO-ENTMCNC: 33.3 G/DL (ref 28.4–34.8)
MCV RBC AUTO: 84.8 FL (ref 82.6–102.9)
MONOCYTES NFR BLD: 0.87 K/UL (ref 0.1–1.2)
MONOCYTES NFR BLD: 15 % (ref 3–12)
NEUTROPHILS NFR BLD: 50 % (ref 36–65)
NEUTS SEG NFR BLD: 3.01 K/UL (ref 1.5–8.1)
NITRITE UR QL STRIP: NEGATIVE
NRBC BLD-RTO: 0 PER 100 WBC
PH UR STRIP: 6 [PH] (ref 5–8)
PLATELET # BLD AUTO: 456 K/UL (ref 138–453)
PMV BLD AUTO: 8.3 FL (ref 8.1–13.5)
POTASSIUM SERPL-SCNC: 4.4 MMOL/L (ref 3.7–5.3)
PROT SERPL-MCNC: 7.6 G/DL (ref 6.6–8.7)
PROT UR STRIP-MCNC: NEGATIVE MG/DL
RBC # BLD AUTO: 4.46 M/UL (ref 4.21–5.77)
RBC # BLD: ABNORMAL 10*6/UL
SODIUM SERPL-SCNC: 140 MMOL/L (ref 136–145)
SP GR UR STRIP: 1.02 (ref 1–1.03)
UROBILINOGEN UR STRIP-ACNC: NORMAL EU/DL (ref 0–1)
WBC OTHER # BLD: 5.9 K/UL (ref 3.5–11.3)

## 2024-12-03 PROCEDURE — 96416 CHEMO PROLONG INFUSE W/PUMP: CPT

## 2024-12-03 PROCEDURE — 36591 DRAW BLOOD OFF VENOUS DEVICE: CPT

## 2024-12-03 PROCEDURE — 96417 CHEMO IV INFUS EACH ADDL SEQ: CPT

## 2024-12-03 PROCEDURE — 81003 URINALYSIS AUTO W/O SCOPE: CPT

## 2024-12-03 PROCEDURE — 96411 CHEMO IV PUSH ADDL DRUG: CPT

## 2024-12-03 PROCEDURE — 96413 CHEMO IV INFUSION 1 HR: CPT

## 2024-12-03 PROCEDURE — 96375 TX/PRO/DX INJ NEW DRUG ADDON: CPT

## 2024-12-03 PROCEDURE — 80053 COMPREHEN METABOLIC PANEL: CPT

## 2024-12-03 PROCEDURE — 85025 COMPLETE CBC W/AUTO DIFF WBC: CPT

## 2024-12-03 PROCEDURE — 2580000003 HC RX 258: Performed by: INTERNAL MEDICINE

## 2024-12-03 PROCEDURE — 96415 CHEMO IV INFUSION ADDL HR: CPT

## 2024-12-03 PROCEDURE — 6360000002 HC RX W HCPCS: Performed by: INTERNAL MEDICINE

## 2024-12-03 PROCEDURE — 96368 THER/DIAG CONCURRENT INF: CPT

## 2024-12-03 RX ORDER — DIPHENHYDRAMINE HYDROCHLORIDE 50 MG/ML
50 INJECTION INTRAMUSCULAR; INTRAVENOUS
Status: CANCELLED | OUTPATIENT
Start: 2024-12-03

## 2024-12-03 RX ORDER — SODIUM CHLORIDE 9 MG/ML
INJECTION, SOLUTION INTRAVENOUS CONTINUOUS
Status: CANCELLED | OUTPATIENT
Start: 2024-12-03

## 2024-12-03 RX ORDER — HEPARIN SODIUM (PORCINE) LOCK FLUSH IV SOLN 100 UNIT/ML 100 UNIT/ML
500 SOLUTION INTRAVENOUS PRN
Status: CANCELLED | OUTPATIENT
Start: 2024-12-05

## 2024-12-03 RX ORDER — SODIUM CHLORIDE 0.9 % (FLUSH) 0.9 %
5-40 SYRINGE (ML) INJECTION PRN
Status: DISCONTINUED | OUTPATIENT
Start: 2024-12-03 | End: 2024-12-04 | Stop reason: HOSPADM

## 2024-12-03 RX ORDER — MEPERIDINE HYDROCHLORIDE 50 MG/ML
12.5 INJECTION INTRAMUSCULAR; INTRAVENOUS; SUBCUTANEOUS PRN
Status: CANCELLED | OUTPATIENT
Start: 2024-12-03

## 2024-12-03 RX ORDER — HYDROCORTISONE SODIUM SUCCINATE 100 MG/2ML
100 INJECTION INTRAMUSCULAR; INTRAVENOUS
Status: CANCELLED | OUTPATIENT
Start: 2024-12-03

## 2024-12-03 RX ORDER — SODIUM CHLORIDE 0.9 % (FLUSH) 0.9 %
5-40 SYRINGE (ML) INJECTION PRN
Status: CANCELLED | OUTPATIENT
Start: 2024-12-05

## 2024-12-03 RX ORDER — FLUOROURACIL 50 MG/ML
400 INJECTION, SOLUTION INTRAVENOUS ONCE
Status: CANCELLED | OUTPATIENT
Start: 2024-12-03 | End: 2024-12-03

## 2024-12-03 RX ORDER — PALONOSETRON 0.05 MG/ML
0.25 INJECTION, SOLUTION INTRAVENOUS ONCE
Status: CANCELLED | OUTPATIENT
Start: 2024-12-03 | End: 2024-12-03

## 2024-12-03 RX ORDER — EPINEPHRINE 1 MG/ML
0.3 INJECTION, SOLUTION, CONCENTRATE INTRAVENOUS PRN
Status: CANCELLED | OUTPATIENT
Start: 2024-12-03

## 2024-12-03 RX ORDER — FLUOROURACIL 50 MG/ML
400 INJECTION, SOLUTION INTRAVENOUS ONCE
Status: COMPLETED | OUTPATIENT
Start: 2024-12-03 | End: 2024-12-03

## 2024-12-03 RX ORDER — HEPARIN 100 UNIT/ML
500 SYRINGE INTRAVENOUS PRN
Status: DISCONTINUED | OUTPATIENT
Start: 2024-12-03 | End: 2024-12-04 | Stop reason: HOSPADM

## 2024-12-03 RX ORDER — DEXTROSE MONOHYDRATE 50 MG/ML
5-250 INJECTION, SOLUTION INTRAVENOUS PRN
Status: CANCELLED | OUTPATIENT
Start: 2024-12-03

## 2024-12-03 RX ORDER — SODIUM CHLORIDE 9 MG/ML
5-250 INJECTION, SOLUTION INTRAVENOUS PRN
Status: CANCELLED | OUTPATIENT
Start: 2024-12-05

## 2024-12-03 RX ORDER — ALBUTEROL SULFATE 90 UG/1
4 INHALANT RESPIRATORY (INHALATION) PRN
Status: CANCELLED | OUTPATIENT
Start: 2024-12-03

## 2024-12-03 RX ORDER — FAMOTIDINE 10 MG/ML
20 INJECTION, SOLUTION INTRAVENOUS
Status: CANCELLED | OUTPATIENT
Start: 2024-12-03

## 2024-12-03 RX ORDER — SODIUM CHLORIDE 9 MG/ML
5-250 INJECTION, SOLUTION INTRAVENOUS PRN
Status: DISCONTINUED | OUTPATIENT
Start: 2024-12-03 | End: 2024-12-04 | Stop reason: HOSPADM

## 2024-12-03 RX ORDER — ACETAMINOPHEN 325 MG/1
650 TABLET ORAL
Status: CANCELLED | OUTPATIENT
Start: 2024-12-03

## 2024-12-03 RX ORDER — DEXTROSE MONOHYDRATE 50 MG/ML
5-250 INJECTION, SOLUTION INTRAVENOUS PRN
Status: DISCONTINUED | OUTPATIENT
Start: 2024-12-03 | End: 2024-12-04 | Stop reason: HOSPADM

## 2024-12-03 RX ORDER — SODIUM CHLORIDE 9 MG/ML
5-250 INJECTION, SOLUTION INTRAVENOUS PRN
Status: CANCELLED | OUTPATIENT
Start: 2024-12-03

## 2024-12-03 RX ORDER — ONDANSETRON 2 MG/ML
8 INJECTION INTRAMUSCULAR; INTRAVENOUS
Status: CANCELLED | OUTPATIENT
Start: 2024-12-03

## 2024-12-03 RX ORDER — PALONOSETRON 0.05 MG/ML
0.25 INJECTION, SOLUTION INTRAVENOUS ONCE
Status: COMPLETED | OUTPATIENT
Start: 2024-12-03 | End: 2024-12-03

## 2024-12-03 RX ORDER — DEXAMETHASONE SODIUM PHOSPHATE 10 MG/ML
10 INJECTION INTRAMUSCULAR; INTRAVENOUS ONCE
Status: COMPLETED | OUTPATIENT
Start: 2024-12-03 | End: 2024-12-03

## 2024-12-03 RX ADMIN — LEUCOVORIN CALCIUM 800 MG: 350 INJECTION, POWDER, LYOPHILIZED, FOR SUSPENSION INTRAMUSCULAR; INTRAVENOUS at 11:52

## 2024-12-03 RX ADMIN — PALONOSETRON HYDROCHLORIDE 0.25 MG: 0.25 INJECTION INTRAVENOUS at 10:54

## 2024-12-03 RX ADMIN — FLUOROURACIL 850 MG: 50 INJECTION, SOLUTION INTRAVENOUS at 14:19

## 2024-12-03 RX ADMIN — SODIUM CHLORIDE 50 ML/HR: 9 INJECTION, SOLUTION INTRAVENOUS at 09:13

## 2024-12-03 RX ADMIN — BEVACIZUMAB-AWWB 475 MG: 400 INJECTION, SOLUTION INTRAVENOUS at 11:10

## 2024-12-03 RX ADMIN — FLUOROURACIL 5000 MG: 50 INJECTION, SOLUTION INTRAVENOUS at 14:20

## 2024-12-03 RX ADMIN — DEXAMETHASONE SODIUM PHOSPHATE 10 MG: 10 INJECTION INTRAMUSCULAR; INTRAVENOUS at 10:54

## 2024-12-03 RX ADMIN — OXALIPLATIN 180 MG: 5 INJECTION, SOLUTION INTRAVENOUS at 11:53

## 2024-12-03 RX ADMIN — SODIUM CHLORIDE, PRESERVATIVE FREE 10 ML: 5 INJECTION INTRAVENOUS at 09:10

## 2024-12-03 RX ADMIN — DEXTROSE MONOHYDRATE 25 ML/HR: 50 INJECTION, SOLUTION INTRAVENOUS at 11:52

## 2024-12-03 ASSESSMENT — PAIN SCALES - GENERAL: PAINLEVEL_OUTOF10: 2

## 2024-12-03 NOTE — PROGRESS NOTES
Patient arrives ambulatory per self for C3 D1 tx  Pt complaints of pulled muscle to right groin which he said may have happened when he was sleeping;otherwise no complaint/concern or new side effects/issues  r/t chemo  VS as charted   Labs drawn per port & reviewed  Within parameters to tx  Pt premedicated   Mvasi infused without adverse reaction  Line flushed  Oxaliplatin & Leucovorin infused concurrently without incident  Line flushed  5FU syringe pushed over 5 minutes with brisk blood return before, during & after push with no adverse reaction   5FU pump connected to infuse over 46 hours with settings verified by 2nd RN.   Pump verified running prior to patient leaving  Pt discharged ambulatory per self   Returns 12/5 for pump removal

## 2024-12-05 ENCOUNTER — HOSPITAL ENCOUNTER (OUTPATIENT)
Facility: MEDICAL CENTER | Age: 44
End: 2024-12-05
Payer: COMMERCIAL

## 2024-12-05 ENCOUNTER — HOSPITAL ENCOUNTER (OUTPATIENT)
Dept: INFUSION THERAPY | Facility: MEDICAL CENTER | Age: 44
Discharge: HOME | End: 2024-12-05
Payer: COMMERCIAL

## 2024-12-05 ENCOUNTER — TELEPHONE (OUTPATIENT)
Dept: ONCOLOGY | Age: 44
End: 2024-12-05

## 2024-12-05 ENCOUNTER — OFFICE VISIT (OUTPATIENT)
Dept: ONCOLOGY | Age: 44
End: 2024-12-05
Payer: COMMERCIAL

## 2024-12-05 VITALS
TEMPERATURE: 96.6 F | WEIGHT: 213.1 LBS | SYSTOLIC BLOOD PRESSURE: 125 MMHG | RESPIRATION RATE: 16 BRPM | HEART RATE: 58 BPM | DIASTOLIC BLOOD PRESSURE: 86 MMHG | BODY MASS INDEX: 34.4 KG/M2

## 2024-12-05 DIAGNOSIS — C77.1 METASTASIS TO MEDIASTINAL LYMPH NODE (HCC): Primary | ICD-10-CM

## 2024-12-05 DIAGNOSIS — C18.7 CANCER OF SIGMOID (HCC): ICD-10-CM

## 2024-12-05 DIAGNOSIS — C18.7 CANCER OF SIGMOID (HCC): Primary | ICD-10-CM

## 2024-12-05 PROCEDURE — 99214 OFFICE O/P EST MOD 30 MIN: CPT | Performed by: INTERNAL MEDICINE

## 2024-12-05 PROCEDURE — 96523 IRRIG DRUG DELIVERY DEVICE: CPT

## 2024-12-05 PROCEDURE — 2580000003 HC RX 258: Performed by: INTERNAL MEDICINE

## 2024-12-05 PROCEDURE — 6360000002 HC RX W HCPCS: Performed by: INTERNAL MEDICINE

## 2024-12-05 PROCEDURE — 99211 OFF/OP EST MAY X REQ PHY/QHP: CPT | Performed by: INTERNAL MEDICINE

## 2024-12-05 RX ORDER — SODIUM CHLORIDE 0.9 % (FLUSH) 0.9 %
5-40 SYRINGE (ML) INJECTION PRN
Status: DISCONTINUED | OUTPATIENT
Start: 2024-12-05 | End: 2024-12-06 | Stop reason: HOSPADM

## 2024-12-05 RX ORDER — HEPARIN 100 UNIT/ML
500 SYRINGE INTRAVENOUS PRN
Status: DISCONTINUED | OUTPATIENT
Start: 2024-12-05 | End: 2024-12-06 | Stop reason: HOSPADM

## 2024-12-05 RX ADMIN — SODIUM CHLORIDE, PRESERVATIVE FREE 10 ML: 5 INJECTION INTRAVENOUS at 11:47

## 2024-12-05 RX ADMIN — HEPARIN 500 UNITS: 100 SYRINGE at 11:47

## 2024-12-05 NOTE — PROGRESS NOTES
Patient arrives at tx area for pump removal following MD visit  Patient states fatigue;denies other complaint  MD note reviewed  Denies issues with pump;pump still running & near completion. Patient stayed until completed  Pump stopped then disconnected from port  Port flushed with brisk blood return noted prior;then heparinized with intact sprague needle removed per protocol.  Pt discharged ambulatory per self  AVS per

## 2024-12-05 NOTE — PROGRESS NOTES
Patient ID: Marcelo Barrios, 1980, [id number], 44 y.o.  Referred by :  Osman Sanchez MD   Reason for consultation: Stage IV sigmoid cancer    Stage IV sigmoid cancer  No MSI high, Tempus  K-rene wild-type      Oncology history  Systemic treatment form of FOLFOX Avastin started 10/29/2024 given every 2 weeks and changed to FOLFOX Erbitux        HISTORY OF PRESENT ILLNESS:    Oncologic History:    Marcelo Barrios is a very pleasant 44 y.o. male.presented initially to [facility] on 8/7/2024 with chief complaints of of abdominal pain.  CT abdomen done at that time showed segmental circumferential wall thickening with the rib enhancing fluid collection measuring 5.8 cm x 2.2 x 3.2 cm concerning for colitis/proctitis.  Also had enlarged retroperitoneal lymph nodes measuring 2.2 x 1.9 cm.  GI evaluated the patient, patient underwent colonoscopy, he was found to have circumferential mucosal abnormality and the scope could not be passed above 20 cm from the anus.  Biopsies were obtained.  And he was discharged home on Levaquin and Flagyl.  His biopsy results came back positive for colonic adenocarcinoma with signet ring features.  Underwent flexible sigmoidoscopy, exploratory laparotomy, creation of end colostomy and resection of multiple peritoneal masses for unresectable colon cancer from the rectum to the proximal sigmoid colon on September 20, 2024  Baseline CEA 58.4;      URGICAL PATHOLOGY CONSULTATION      Patient Name: MARCELO BARRIOS   [facility] Rec: [id number]   Path Number: [id number]   Collected: 8/9/2024   Received: 8/9/2024   Reported: 8/15/2024 09:16     -- Diagnosis --   COLON, SITE NOT SPECIFIED, BIOPSY:   -COLONIC ADENOCARCINOMA WITH SIGNET RING FEATURES.       IHC interpretation for MMR proteins:   No loss of nuclear expression of MMR proteins: low probability of   microsatellite instability-high (MSI-H).     9/20/24 0000    Surgical Pathology Report Path Number: [id number]    -- Diagnosis

## 2024-12-05 NOTE — TELEPHONE ENCOUNTER
MARCELO HERE FOR FOLLOW UP & PUMP OFF  Rtc in 2 weeks  IN 2 WKS DR. VELÁSQUEZ IS IN [place].   MD VISIT: 1/2/25 @ 10:30AM   AVS PRINTED AND GIVEN ON EXIT

## 2024-12-10 ENCOUNTER — HOSPITAL ENCOUNTER (OUTPATIENT)
Facility: MEDICAL CENTER | Age: 44
End: 2024-12-10
Payer: COMMERCIAL

## 2024-12-10 ENCOUNTER — TELEPHONE (OUTPATIENT)
Dept: INFUSION THERAPY | Facility: MEDICAL CENTER | Age: 44
End: 2024-12-10

## 2024-12-10 NOTE — TELEPHONE ENCOUNTER
See previous note from writer on 11/14/24.  Writer called re email message sent on 12/9/24.  Re if test on [id number] is a duplicate, did call and clarify with [facility] staff, she states order is duplicate and asked if we want tissue testing done and writer notified [facility] that do want tissue tested and states should have results in 2 days.

## 2024-12-17 ENCOUNTER — HOSPITAL ENCOUNTER (OUTPATIENT)
Dept: INFUSION THERAPY | Facility: MEDICAL CENTER | Age: 44
Discharge: HOME | End: 2024-12-17
Payer: COMMERCIAL

## 2024-12-17 VITALS
TEMPERATURE: 98.1 F | WEIGHT: 206.8 LBS | DIASTOLIC BLOOD PRESSURE: 85 MMHG | BODY MASS INDEX: 33.38 KG/M2 | OXYGEN SATURATION: 99 % | HEART RATE: 71 BPM | SYSTOLIC BLOOD PRESSURE: 125 MMHG | RESPIRATION RATE: 18 BRPM

## 2024-12-17 DIAGNOSIS — C18.7 CANCER OF SIGMOID (HCC): Primary | ICD-10-CM

## 2024-12-17 DIAGNOSIS — C77.1 METASTASIS TO MEDIASTINAL LYMPH NODE (HCC): ICD-10-CM

## 2024-12-17 LAB
ALBUMIN SERPL-MCNC: 3.9 G/DL (ref 3.5–5.2)
ALBUMIN/GLOB SERPL: 1.1 {RATIO} (ref 1–2.5)
ALP SERPL-CCNC: 97 U/L (ref 40–129)
ALT SERPL-CCNC: 8 U/L (ref 10–50)
ANION GAP SERPL CALCULATED.3IONS-SCNC: 11 MMOL/L (ref 9–16)
AST SERPL-CCNC: 17 U/L (ref 10–50)
BASOPHILS # BLD: <0.03 K/UL (ref 0–0.2)
BASOPHILS NFR BLD: 0 % (ref 0–2)
BILIRUB SERPL-MCNC: 0.3 MG/DL (ref 0–1.2)
BUN SERPL-MCNC: 12 MG/DL (ref 6–20)
CALCIUM SERPL-MCNC: 9.4 MG/DL (ref 8.6–10.4)
CEA SERPL-MCNC: 165 NG/ML (ref 0–3.8)
CHLORIDE SERPL-SCNC: 105 MMOL/L (ref 98–107)
CO2 SERPL-SCNC: 25 MMOL/L (ref 20–31)
CREAT SERPL-MCNC: 0.9 MG/DL (ref 0.7–1.2)
EOSINOPHIL # BLD: 0.19 K/UL (ref 0–0.44)
EOSINOPHILS RELATIVE PERCENT: 5 % (ref 1–4)
ERYTHROCYTE [DISTWIDTH] IN BLOOD BY AUTOMATED COUNT: 14.5 % (ref 11.8–14.4)
GFR, ESTIMATED: >90 ML/MIN/1.73M2
GLUCOSE SERPL-MCNC: 91 MG/DL (ref 74–99)
HCT VFR BLD AUTO: 34.6 % (ref 40.7–50.3)
HGB BLD-MCNC: 11.5 G/DL (ref 13–17)
IMM GRANULOCYTES # BLD AUTO: 0 K/UL (ref 0–0.3)
IMM GRANULOCYTES NFR BLD: 0 %
LYMPHOCYTES NFR BLD: 1.67 K/UL (ref 1.1–3.7)
LYMPHOCYTES RELATIVE PERCENT: 39 % (ref 24–43)
MAGNESIUM SERPL-MCNC: 2 MG/DL (ref 1.6–2.6)
MCH RBC QN AUTO: 27.6 PG (ref 25.2–33.5)
MCHC RBC AUTO-ENTMCNC: 33.2 G/DL (ref 28.4–34.8)
MCV RBC AUTO: 83 FL (ref 82.6–102.9)
MONOCYTES NFR BLD: 0.51 K/UL (ref 0.1–1.2)
MONOCYTES NFR BLD: 12 % (ref 3–12)
NEUTROPHILS NFR BLD: 44 % (ref 36–65)
NEUTS SEG NFR BLD: 1.87 K/UL (ref 1.5–8.1)
NRBC BLD-RTO: 0 PER 100 WBC
PLATELET # BLD AUTO: 289 K/UL (ref 138–453)
PMV BLD AUTO: 8.3 FL (ref 8.1–13.5)
POTASSIUM SERPL-SCNC: 4.1 MMOL/L (ref 3.7–5.3)
PROT SERPL-MCNC: 7.5 G/DL (ref 6.6–8.7)
RBC # BLD AUTO: 4.17 M/UL (ref 4.21–5.77)
RBC # BLD: ABNORMAL 10*6/UL
SODIUM SERPL-SCNC: 141 MMOL/L (ref 136–145)
WBC OTHER # BLD: 4.3 K/UL (ref 3.5–11.3)

## 2024-12-17 PROCEDURE — 96368 THER/DIAG CONCURRENT INF: CPT

## 2024-12-17 PROCEDURE — 96411 CHEMO IV PUSH ADDL DRUG: CPT

## 2024-12-17 PROCEDURE — 96416 CHEMO PROLONG INFUSE W/PUMP: CPT

## 2024-12-17 PROCEDURE — 96413 CHEMO IV INFUSION 1 HR: CPT

## 2024-12-17 PROCEDURE — 85025 COMPLETE CBC W/AUTO DIFF WBC: CPT

## 2024-12-17 PROCEDURE — 80053 COMPREHEN METABOLIC PANEL: CPT

## 2024-12-17 PROCEDURE — 96375 TX/PRO/DX INJ NEW DRUG ADDON: CPT

## 2024-12-17 PROCEDURE — 83735 ASSAY OF MAGNESIUM: CPT

## 2024-12-17 PROCEDURE — 82378 CARCINOEMBRYONIC ANTIGEN: CPT

## 2024-12-17 PROCEDURE — 96417 CHEMO IV INFUS EACH ADDL SEQ: CPT

## 2024-12-17 PROCEDURE — 2580000003 HC RX 258: Performed by: INTERNAL MEDICINE

## 2024-12-17 PROCEDURE — 96415 CHEMO IV INFUSION ADDL HR: CPT

## 2024-12-17 PROCEDURE — 6360000002 HC RX W HCPCS: Performed by: INTERNAL MEDICINE

## 2024-12-17 RX ORDER — FAMOTIDINE 10 MG/ML
20 INJECTION, SOLUTION INTRAVENOUS
Status: CANCELLED | OUTPATIENT
Start: 2024-12-17

## 2024-12-17 RX ORDER — SODIUM CHLORIDE 9 MG/ML
5-250 INJECTION, SOLUTION INTRAVENOUS PRN
Status: DISCONTINUED | OUTPATIENT
Start: 2024-12-17 | End: 2024-12-18 | Stop reason: HOSPADM

## 2024-12-17 RX ORDER — HEPARIN SODIUM (PORCINE) LOCK FLUSH IV SOLN 100 UNIT/ML 100 UNIT/ML
500 SOLUTION INTRAVENOUS PRN
Status: CANCELLED | OUTPATIENT
Start: 2024-12-19

## 2024-12-17 RX ORDER — SODIUM CHLORIDE 9 MG/ML
INJECTION, SOLUTION INTRAVENOUS CONTINUOUS
Status: CANCELLED | OUTPATIENT
Start: 2024-12-17

## 2024-12-17 RX ORDER — EPINEPHRINE 1 MG/ML
0.3 INJECTION, SOLUTION, CONCENTRATE INTRAVENOUS PRN
Status: CANCELLED | OUTPATIENT
Start: 2024-12-17

## 2024-12-17 RX ORDER — DEXTROSE MONOHYDRATE 50 MG/ML
5-250 INJECTION, SOLUTION INTRAVENOUS PRN
Status: DISCONTINUED | OUTPATIENT
Start: 2024-12-17 | End: 2024-12-18 | Stop reason: HOSPADM

## 2024-12-17 RX ORDER — MEPERIDINE HYDROCHLORIDE 50 MG/ML
12.5 INJECTION INTRAMUSCULAR; INTRAVENOUS; SUBCUTANEOUS PRN
Status: CANCELLED | OUTPATIENT
Start: 2024-12-17

## 2024-12-17 RX ORDER — SODIUM CHLORIDE 0.9 % (FLUSH) 0.9 %
5-40 SYRINGE (ML) INJECTION PRN
Status: CANCELLED | OUTPATIENT
Start: 2024-12-19

## 2024-12-17 RX ORDER — PALONOSETRON 0.05 MG/ML
0.25 INJECTION, SOLUTION INTRAVENOUS ONCE
Status: COMPLETED | OUTPATIENT
Start: 2024-12-17 | End: 2024-12-17

## 2024-12-17 RX ORDER — SODIUM CHLORIDE 0.9 % (FLUSH) 0.9 %
5-40 SYRINGE (ML) INJECTION PRN
Status: CANCELLED | OUTPATIENT
Start: 2024-12-17

## 2024-12-17 RX ORDER — HEPARIN SODIUM (PORCINE) LOCK FLUSH IV SOLN 100 UNIT/ML 100 UNIT/ML
500 SOLUTION INTRAVENOUS PRN
Status: CANCELLED | OUTPATIENT
Start: 2024-12-17

## 2024-12-17 RX ORDER — FLUOROURACIL 50 MG/ML
400 INJECTION, SOLUTION INTRAVENOUS ONCE
Status: COMPLETED | OUTPATIENT
Start: 2024-12-17 | End: 2024-12-17

## 2024-12-17 RX ORDER — DEXAMETHASONE SODIUM PHOSPHATE 10 MG/ML
10 INJECTION INTRAMUSCULAR; INTRAVENOUS ONCE
Status: COMPLETED | OUTPATIENT
Start: 2024-12-17 | End: 2024-12-17

## 2024-12-17 RX ORDER — SODIUM CHLORIDE 9 MG/ML
5-250 INJECTION, SOLUTION INTRAVENOUS PRN
Status: CANCELLED | OUTPATIENT
Start: 2024-12-19

## 2024-12-17 RX ORDER — HYDROCORTISONE SODIUM SUCCINATE 100 MG/2ML
100 INJECTION INTRAMUSCULAR; INTRAVENOUS
Status: CANCELLED | OUTPATIENT
Start: 2024-12-17

## 2024-12-17 RX ORDER — ACETAMINOPHEN 325 MG/1
650 TABLET ORAL
Status: CANCELLED | OUTPATIENT
Start: 2024-12-17

## 2024-12-17 RX ORDER — FLUOROURACIL 50 MG/ML
400 INJECTION, SOLUTION INTRAVENOUS ONCE
Status: CANCELLED | OUTPATIENT
Start: 2024-12-17 | End: 2024-12-17

## 2024-12-17 RX ORDER — DIPHENHYDRAMINE HYDROCHLORIDE 50 MG/ML
50 INJECTION INTRAMUSCULAR; INTRAVENOUS
Status: CANCELLED | OUTPATIENT
Start: 2024-12-17

## 2024-12-17 RX ORDER — ALBUTEROL SULFATE 90 UG/1
4 INHALANT RESPIRATORY (INHALATION) PRN
Status: CANCELLED | OUTPATIENT
Start: 2024-12-17

## 2024-12-17 RX ORDER — SODIUM CHLORIDE 9 MG/ML
5-250 INJECTION, SOLUTION INTRAVENOUS PRN
Status: CANCELLED | OUTPATIENT
Start: 2024-12-17

## 2024-12-17 RX ORDER — ONDANSETRON 2 MG/ML
8 INJECTION INTRAMUSCULAR; INTRAVENOUS
Status: CANCELLED | OUTPATIENT
Start: 2024-12-17

## 2024-12-17 RX ADMIN — PALONOSETRON 0.25 MG: 0.05 INJECTION, SOLUTION INTRAVENOUS at 12:15

## 2024-12-17 RX ADMIN — FLUOROURACIL 850 MG: 50 INJECTION, SOLUTION INTRAVENOUS at 16:26

## 2024-12-17 RX ADMIN — DEXAMETHASONE SODIUM PHOSPHATE 10 MG: 10 INJECTION INTRAMUSCULAR; INTRAVENOUS at 12:16

## 2024-12-17 RX ADMIN — DEXTROSE MONOHYDRATE 20 ML/HR: 50 INJECTION, SOLUTION INTRAVENOUS at 13:49

## 2024-12-17 RX ADMIN — PANITUMUMAB 560 MG: 400 SOLUTION INTRAVENOUS at 12:41

## 2024-12-17 RX ADMIN — LEUCOVORIN CALCIUM 800 MG: 350 INJECTION, POWDER, LYOPHILIZED, FOR SUSPENSION INTRAMUSCULAR; INTRAVENOUS at 13:56

## 2024-12-17 RX ADMIN — SODIUM CHLORIDE 20 ML/HR: 9 INJECTION, SOLUTION INTRAVENOUS at 12:12

## 2024-12-17 RX ADMIN — FLUOROURACIL 5000 MG: 50 INJECTION, SOLUTION INTRAVENOUS at 16:26

## 2024-12-17 RX ADMIN — OXALIPLATIN 180 MG: 5 INJECTION, SOLUTION INTRAVENOUS at 13:55

## 2024-12-17 NOTE — PROGRESS NOTES
Patient presents ambulatory for C1D1 treatment. Pt denies any pain or new concerns. VS as charted. Port accessed per protocol, labs obtained.     Patient premedicated.   Vectibix education provided for first dose then started. Consent signs and medication administered with no adverse reactions noted. Line flushed.   Eloxatin and leucovorin administered concurrently with no noted adverse reactions.   Fluorouracil IVP administered and pump started, infusing prior to patient discharge.     Patient discharged per self, aware to return 12/19 for pump removal.

## 2024-12-18 ENCOUNTER — TELEPHONE (OUTPATIENT)
Dept: INFUSION THERAPY | Facility: MEDICAL CENTER | Age: 44
End: 2024-12-18

## 2024-12-18 NOTE — TELEPHONE ENCOUNTER
New Chemotherapy Order - Dr. Sanchez      Pathology -  metastatic adenocarcinoma 9/20/24        26  cycles , FOLFOX           Ht = 167.6 cm     Wt = 98.3 kg     BSA =  2.1        Vectibix 6 mg/ m2 = 559.8 mg  rounded - 560 mg , IV     Mag 2000 mg      Oxaliplatin - 85mg/ m2 = 178.5 mg  rounded - 180 mg , IV      Leucovorin - 400 mg/m2 = 800 mg (Capped BSA 2.0) , IV      5FU push  - 400 mg/m2 = 840 mg rounded - 850 mg , IVP      5FU pump  - 2400mg/m2 = 4992 mg rounded 5000 mg cadd pump (order specified BSA 2.08 )         Chart to  for scheduling , note to pool for 2nd RN check

## 2024-12-19 ENCOUNTER — HOSPITAL ENCOUNTER (OUTPATIENT)
Dept: INFUSION THERAPY | Facility: MEDICAL CENTER | Age: 44
Discharge: HOME | End: 2024-12-19
Payer: COMMERCIAL

## 2024-12-19 VITALS
DIASTOLIC BLOOD PRESSURE: 77 MMHG | SYSTOLIC BLOOD PRESSURE: 114 MMHG | RESPIRATION RATE: 18 BRPM | TEMPERATURE: 97.8 F | HEART RATE: 76 BPM

## 2024-12-19 DIAGNOSIS — C18.7 CANCER OF SIGMOID (HCC): Primary | ICD-10-CM

## 2024-12-19 PROCEDURE — 2500000003 HC RX 250 WO HCPCS: Performed by: INTERNAL MEDICINE

## 2024-12-19 PROCEDURE — 6360000002 HC RX W HCPCS: Performed by: INTERNAL MEDICINE

## 2024-12-19 PROCEDURE — 96523 IRRIG DRUG DELIVERY DEVICE: CPT

## 2024-12-19 RX ORDER — HEPARIN 100 UNIT/ML
500 SYRINGE INTRAVENOUS PRN
Status: DISCONTINUED | OUTPATIENT
Start: 2024-12-19 | End: 2024-12-20 | Stop reason: HOSPADM

## 2024-12-19 RX ORDER — FLUOROURACIL 50 MG/ML
400 INJECTION, SOLUTION INTRAVENOUS ONCE
OUTPATIENT
Start: 2024-12-30 | End: 2024-12-30

## 2024-12-19 RX ORDER — SODIUM CHLORIDE 0.9 % (FLUSH) 0.9 %
5-40 SYRINGE (ML) INJECTION PRN
OUTPATIENT
Start: 2024-12-30

## 2024-12-19 RX ORDER — DEXAMETHASONE SODIUM PHOSPHATE 10 MG/ML
10 INJECTION, SOLUTION INTRAMUSCULAR; INTRAVENOUS ONCE
OUTPATIENT
Start: 2024-12-30 | End: 2024-12-30

## 2024-12-19 RX ORDER — SODIUM CHLORIDE 9 MG/ML
5-250 INJECTION, SOLUTION INTRAVENOUS PRN
OUTPATIENT
Start: 2024-12-30

## 2024-12-19 RX ORDER — DEXTROSE MONOHYDRATE 50 MG/ML
5-250 INJECTION, SOLUTION INTRAVENOUS PRN
OUTPATIENT
Start: 2024-12-30

## 2024-12-19 RX ORDER — SODIUM CHLORIDE 0.9 % (FLUSH) 0.9 %
5-40 SYRINGE (ML) INJECTION PRN
Status: DISCONTINUED | OUTPATIENT
Start: 2024-12-19 | End: 2024-12-20 | Stop reason: HOSPADM

## 2024-12-19 RX ORDER — SODIUM CHLORIDE 9 MG/ML
INJECTION, SOLUTION INTRAVENOUS CONTINUOUS
OUTPATIENT
Start: 2024-12-30

## 2024-12-19 RX ORDER — DIPHENHYDRAMINE HYDROCHLORIDE 50 MG/ML
50 INJECTION INTRAMUSCULAR; INTRAVENOUS
OUTPATIENT
Start: 2024-12-30

## 2024-12-19 RX ORDER — HEPARIN 100 UNIT/ML
500 SYRINGE INTRAVENOUS PRN
OUTPATIENT
Start: 2025-01-01

## 2024-12-19 RX ORDER — ALBUTEROL SULFATE 90 UG/1
4 INHALANT RESPIRATORY (INHALATION) PRN
OUTPATIENT
Start: 2024-12-30

## 2024-12-19 RX ORDER — FAMOTIDINE 10 MG/ML
20 INJECTION, SOLUTION INTRAVENOUS
OUTPATIENT
Start: 2024-12-30

## 2024-12-19 RX ORDER — MAGNESIUM SULFATE IN WATER 40 MG/ML
2000 INJECTION, SOLUTION INTRAVENOUS PRN
OUTPATIENT
Start: 2024-12-30

## 2024-12-19 RX ORDER — PALONOSETRON 0.05 MG/ML
0.25 INJECTION, SOLUTION INTRAVENOUS ONCE
OUTPATIENT
Start: 2024-12-30 | End: 2024-12-30

## 2024-12-19 RX ORDER — SODIUM CHLORIDE 9 MG/ML
5-250 INJECTION, SOLUTION INTRAVENOUS PRN
OUTPATIENT
Start: 2025-01-01

## 2024-12-19 RX ORDER — ACETAMINOPHEN 325 MG/1
650 TABLET ORAL
OUTPATIENT
Start: 2024-12-30

## 2024-12-19 RX ORDER — HYDROCORTISONE SODIUM SUCCINATE 100 MG/2ML
100 INJECTION INTRAMUSCULAR; INTRAVENOUS
OUTPATIENT
Start: 2024-12-30

## 2024-12-19 RX ORDER — MEPERIDINE HYDROCHLORIDE 25 MG/ML
12.5 INJECTION INTRAMUSCULAR; INTRAVENOUS; SUBCUTANEOUS PRN
OUTPATIENT
Start: 2024-12-30

## 2024-12-19 RX ORDER — SODIUM CHLORIDE 0.9 % (FLUSH) 0.9 %
5-40 SYRINGE (ML) INJECTION PRN
OUTPATIENT
Start: 2025-01-01

## 2024-12-19 RX ORDER — HEPARIN 100 UNIT/ML
500 SYRINGE INTRAVENOUS PRN
OUTPATIENT
Start: 2024-12-30

## 2024-12-19 RX ORDER — EPINEPHRINE 1 MG/ML
0.3 INJECTION, SOLUTION, CONCENTRATE INTRAVENOUS PRN
OUTPATIENT
Start: 2024-12-30

## 2024-12-19 RX ORDER — ONDANSETRON 2 MG/ML
8 INJECTION INTRAMUSCULAR; INTRAVENOUS
OUTPATIENT
Start: 2024-12-30

## 2024-12-19 RX ADMIN — HEPARIN 500 UNITS: 100 SYRINGE at 15:31

## 2024-12-19 RX ADMIN — SODIUM CHLORIDE, PRESERVATIVE FREE 20 ML: 5 INJECTION INTRAVENOUS at 15:31

## 2024-12-19 NOTE — PROGRESS NOTES
Patient arrives ambulatory per self for pump removal  Patient states was having some muscle spasms;denies other complaint/concern  VS as charted  Denies issues with pump  Pump stopped prior to arrival  Port flushed with brisk blood return noted;then heparinized with intact sprague needle removed per protocol.  Pt discharged ambulatory per self with printed calendar in hand

## 2025-01-02 ENCOUNTER — HOSPITAL ENCOUNTER (OUTPATIENT)
Facility: MEDICAL CENTER | Age: 45
End: 2025-01-02
Payer: COMMERCIAL

## 2025-01-02 ENCOUNTER — OFFICE VISIT (OUTPATIENT)
Dept: ONCOLOGY | Age: 45
End: 2025-01-02
Payer: COMMERCIAL

## 2025-01-02 ENCOUNTER — TELEPHONE (OUTPATIENT)
Dept: ONCOLOGY | Age: 45
End: 2025-01-02

## 2025-01-02 VITALS
DIASTOLIC BLOOD PRESSURE: 66 MMHG | RESPIRATION RATE: 18 BRPM | SYSTOLIC BLOOD PRESSURE: 116 MMHG | TEMPERATURE: 98.2 F | BODY MASS INDEX: 33.8 KG/M2 | HEART RATE: 90 BPM | WEIGHT: 209.4 LBS

## 2025-01-02 DIAGNOSIS — L27.0 DRUG-INDUCED SKIN RASH: ICD-10-CM

## 2025-01-02 DIAGNOSIS — T45.1X5D ADVERSE EFFECT OF CHEMOTHERAPY, SUBSEQUENT ENCOUNTER: ICD-10-CM

## 2025-01-02 DIAGNOSIS — D64.9 NORMOCYTIC ANEMIA: ICD-10-CM

## 2025-01-02 DIAGNOSIS — C77.1 METASTASIS TO MEDIASTINAL LYMPH NODE (HCC): Primary | ICD-10-CM

## 2025-01-02 DIAGNOSIS — C18.7 CANCER OF SIGMOID (HCC): ICD-10-CM

## 2025-01-02 PROBLEM — T45.1X5A CHEMOTHERAPY ADVERSE REACTION: Status: ACTIVE | Noted: 2025-01-02

## 2025-01-02 PROCEDURE — 99214 OFFICE O/P EST MOD 30 MIN: CPT | Performed by: INTERNAL MEDICINE

## 2025-01-02 PROCEDURE — 99211 OFF/OP EST MAY X REQ PHY/QHP: CPT | Performed by: INTERNAL MEDICINE

## 2025-01-02 RX ORDER — DOXYCYCLINE HYCLATE 100 MG
100 TABLET ORAL 2 TIMES DAILY
Qty: 14 TABLET | Refills: 0 | Status: SHIPPED | OUTPATIENT
Start: 2025-01-02 | End: 2025-01-09

## 2025-01-02 RX ORDER — CALCIUM CARBONATE 500 MG/1
1 TABLET, CHEWABLE ORAL DAILY
COMMUNITY

## 2025-01-02 NOTE — TELEPHONE ENCOUNTER
MARCELO HERE FOR MD VISIT  Proceed with chemotherapy with no panitumumab  Rtc in 2-3 weeks  MD VISIT 1/23/25 @ 2:30PM  AVS PRINTED W/ INSTRUCTIONS AND GIVEN TO PT ON EXIT

## 2025-01-02 NOTE — PROGRESS NOTES
Patient ID: Marcelo Barrios, 1980, [id number], 44 y.o.  Referred by :  No ref. provider found   Reason for consultation: Stage IV sigmoid cancer    Stage IV sigmoid cancer  No MSI high, Tempus  K-rene wild-type      Oncology history  Systemic treatment form of FOLFOX Avastin started 10/29/2024 given every 2 weeks and changed to FOLFOX panitumumab        HISTORY OF PRESENT ILLNESS:    Oncologic History:    Marcelo Barrios is a very pleasant 44 y.o. male.presented initially to [facility] on 8/7/2024 with chief complaints of of abdominal pain.  CT abdomen done at that time showed segmental circumferential wall thickening with the rib enhancing fluid collection measuring 5.8 cm x 2.2 x 3.2 cm concerning for colitis/proctitis.  Also had enlarged retroperitoneal lymph nodes measuring 2.2 x 1.9 cm.  GI evaluated the patient, patient underwent colonoscopy, he was found to have circumferential mucosal abnormality and the scope could not be passed above 20 cm from the anus.  Biopsies were obtained.  And he was discharged home on Levaquin and Flagyl.  His biopsy results came back positive for colonic adenocarcinoma with signet ring features.  Underwent flexible sigmoidoscopy, exploratory laparotomy, creation of end colostomy and resection of multiple peritoneal masses for unresectable colon cancer from the rectum to the proximal sigmoid colon on September 20, 2024  Baseline CEA 58.4;      URGICAL PATHOLOGY CONSULTATION      Patient Name: MARCELO BARRIOS   [facility] Rec: [id number]   Path Number: [id number]   Collected: 8/9/2024   Received: 8/9/2024   Reported: 8/15/2024 09:16     -- Diagnosis --   COLON, SITE NOT SPECIFIED, BIOPSY:   -COLONIC ADENOCARCINOMA WITH SIGNET RING FEATURES.       IHC interpretation for MMR proteins:   No loss of nuclear expression of MMR proteins: low probability of   microsatellite instability-high (MSI-H).     9/20/24 0000    Surgical Pathology Report Path Number: [id number]    --

## 2025-01-06 ENCOUNTER — TELEPHONE (OUTPATIENT)
Dept: ONCOLOGY | Age: 45
End: 2025-01-06

## 2025-01-06 ENCOUNTER — HOSPITAL ENCOUNTER (OUTPATIENT)
Facility: MEDICAL CENTER | Age: 45
End: 2025-01-06
Payer: COMMERCIAL

## 2025-01-06 NOTE — TELEPHONE ENCOUNTER
Genetic test results disclosed to patient by [employer] Genetic Counselor on 1/3/25. Progress note and test results attached in Media. Results routed to referring provider and Nurse Navigator.

## 2025-01-07 ENCOUNTER — CLINICAL DOCUMENTATION (OUTPATIENT)
Facility: HOSPITAL | Age: 45
End: 2025-01-07

## 2025-01-07 ENCOUNTER — HOSPITAL ENCOUNTER (OUTPATIENT)
Dept: INFUSION THERAPY | Facility: MEDICAL CENTER | Age: 45
Discharge: HOME | End: 2025-01-07
Payer: COMMERCIAL

## 2025-01-07 ENCOUNTER — HOSPITAL ENCOUNTER (OUTPATIENT)
Facility: MEDICAL CENTER | Age: 45
End: 2025-01-07
Payer: COMMERCIAL

## 2025-01-07 VITALS
HEART RATE: 63 BPM | WEIGHT: 208.1 LBS | RESPIRATION RATE: 18 BRPM | DIASTOLIC BLOOD PRESSURE: 69 MMHG | TEMPERATURE: 98 F | BODY MASS INDEX: 33.59 KG/M2 | SYSTOLIC BLOOD PRESSURE: 103 MMHG

## 2025-01-07 DIAGNOSIS — C18.7 CANCER OF SIGMOID (HCC): Primary | ICD-10-CM

## 2025-01-07 PROBLEM — L27.0 DRUG-INDUCED SKIN RASH: Status: ACTIVE | Noted: 2025-01-07

## 2025-01-07 LAB
ALBUMIN SERPL-MCNC: 3.7 G/DL (ref 3.5–5.2)
ALBUMIN/GLOB SERPL: 1 {RATIO} (ref 1–2.5)
ALP SERPL-CCNC: 121 U/L (ref 40–129)
ALT SERPL-CCNC: 10 U/L (ref 10–50)
ANION GAP SERPL CALCULATED.3IONS-SCNC: 10 MMOL/L (ref 9–16)
AST SERPL-CCNC: 17 U/L (ref 10–50)
BASOPHILS # BLD: 0.03 K/UL (ref 0–0.2)
BASOPHILS NFR BLD: 0 % (ref 0–2)
BILIRUB SERPL-MCNC: 0.2 MG/DL (ref 0–1.2)
BUN SERPL-MCNC: 10 MG/DL (ref 6–20)
CALCIUM SERPL-MCNC: 9.2 MG/DL (ref 8.6–10.4)
CHLORIDE SERPL-SCNC: 103 MMOL/L (ref 98–107)
CO2 SERPL-SCNC: 26 MMOL/L (ref 20–31)
CREAT SERPL-MCNC: 0.9 MG/DL (ref 0.7–1.2)
EOSINOPHIL # BLD: 0.11 K/UL (ref 0–0.44)
EOSINOPHILS RELATIVE PERCENT: 1 % (ref 1–4)
ERYTHROCYTE [DISTWIDTH] IN BLOOD BY AUTOMATED COUNT: 16.1 % (ref 11.8–14.4)
GFR, ESTIMATED: >90 ML/MIN/1.73M2
GLUCOSE SERPL-MCNC: 105 MG/DL (ref 74–99)
HCT VFR BLD AUTO: 36.8 % (ref 40.7–50.3)
HGB BLD-MCNC: 12.3 G/DL (ref 13–17)
IMM GRANULOCYTES # BLD AUTO: 0.05 K/UL (ref 0–0.3)
IMM GRANULOCYTES NFR BLD: 1 %
LYMPHOCYTES NFR BLD: 1.85 K/UL (ref 1.1–3.7)
LYMPHOCYTES RELATIVE PERCENT: 24 % (ref 24–43)
MAGNESIUM SERPL-MCNC: 2.1 MG/DL (ref 1.6–2.6)
MCH RBC QN AUTO: 27.6 PG (ref 25.2–33.5)
MCHC RBC AUTO-ENTMCNC: 33.4 G/DL (ref 28.4–34.8)
MCV RBC AUTO: 82.5 FL (ref 82.6–102.9)
MONOCYTES NFR BLD: 1.14 K/UL (ref 0.1–1.2)
MONOCYTES NFR BLD: 15 % (ref 3–12)
NEUTROPHILS NFR BLD: 59 % (ref 36–65)
NEUTS SEG NFR BLD: 4.6 K/UL (ref 1.5–8.1)
NRBC BLD-RTO: 0 PER 100 WBC
PLATELET # BLD AUTO: 397 K/UL (ref 138–453)
PMV BLD AUTO: 8.3 FL (ref 8.1–13.5)
POTASSIUM SERPL-SCNC: 3.8 MMOL/L (ref 3.7–5.3)
PROT SERPL-MCNC: 7.4 G/DL (ref 6.6–8.7)
RBC # BLD AUTO: 4.46 M/UL (ref 4.21–5.77)
RBC # BLD: ABNORMAL 10*6/UL
SODIUM SERPL-SCNC: 138 MMOL/L (ref 136–145)
WBC OTHER # BLD: 7.8 K/UL (ref 3.5–11.3)

## 2025-01-07 PROCEDURE — 85025 COMPLETE CBC W/AUTO DIFF WBC: CPT

## 2025-01-07 PROCEDURE — 80053 COMPREHEN METABOLIC PANEL: CPT

## 2025-01-07 PROCEDURE — 83735 ASSAY OF MAGNESIUM: CPT

## 2025-01-07 PROCEDURE — 96415 CHEMO IV INFUSION ADDL HR: CPT

## 2025-01-07 PROCEDURE — 96416 CHEMO PROLONG INFUSE W/PUMP: CPT

## 2025-01-07 PROCEDURE — 6360000002 HC RX W HCPCS: Performed by: INTERNAL MEDICINE

## 2025-01-07 PROCEDURE — 96411 CHEMO IV PUSH ADDL DRUG: CPT

## 2025-01-07 PROCEDURE — 96375 TX/PRO/DX INJ NEW DRUG ADDON: CPT

## 2025-01-07 PROCEDURE — 96413 CHEMO IV INFUSION 1 HR: CPT

## 2025-01-07 PROCEDURE — 2580000003 HC RX 258: Performed by: INTERNAL MEDICINE

## 2025-01-07 PROCEDURE — 96368 THER/DIAG CONCURRENT INF: CPT

## 2025-01-07 RX ORDER — PALONOSETRON 0.05 MG/ML
0.25 INJECTION, SOLUTION INTRAVENOUS ONCE
Status: COMPLETED | OUTPATIENT
Start: 2025-01-07 | End: 2025-01-07

## 2025-01-07 RX ORDER — DEXTROSE MONOHYDRATE 50 MG/ML
5-250 INJECTION, SOLUTION INTRAVENOUS PRN
Status: DISCONTINUED | OUTPATIENT
Start: 2025-01-07 | End: 2025-01-08 | Stop reason: HOSPADM

## 2025-01-07 RX ORDER — DEXAMETHASONE SODIUM PHOSPHATE 10 MG/ML
10 INJECTION INTRAMUSCULAR; INTRAVENOUS ONCE
Status: COMPLETED | OUTPATIENT
Start: 2025-01-07 | End: 2025-01-07

## 2025-01-07 RX ORDER — FLUOROURACIL 50 MG/ML
400 INJECTION, SOLUTION INTRAVENOUS ONCE
Status: COMPLETED | OUTPATIENT
Start: 2025-01-07 | End: 2025-01-07

## 2025-01-07 RX ADMIN — LEUCOVORIN CALCIUM 800 MG: 100 INJECTION, POWDER, LYOPHILIZED, FOR SUSPENSION INTRAMUSCULAR; INTRAVENOUS at 11:40

## 2025-01-07 RX ADMIN — OXALIPLATIN 180 MG: 5 INJECTION, SOLUTION INTRAVENOUS at 11:42

## 2025-01-07 RX ADMIN — DEXTROSE MONOHYDRATE 20 ML/HR: 50 INJECTION, SOLUTION INTRAVENOUS at 10:57

## 2025-01-07 RX ADMIN — PALONOSETRON 0.25 MG: 0.05 INJECTION, SOLUTION INTRAVENOUS at 10:57

## 2025-01-07 RX ADMIN — FLUOROURACIL 5000 MG: 50 INJECTION, SOLUTION INTRAVENOUS at 14:07

## 2025-01-07 RX ADMIN — FLUOROURACIL 850 MG: 50 INJECTION, SOLUTION INTRAVENOUS at 14:07

## 2025-01-07 RX ADMIN — DEXAMETHASONE SODIUM PHOSPHATE 10 MG: 10 INJECTION INTRAMUSCULAR; INTRAVENOUS at 10:57

## 2025-01-07 NOTE — PROGRESS NOTES
Patient Assistance    Writer met with patient to discuss VECTIBIX copay assistance. Signed consent obtained and patient enrolled.

## 2025-01-07 NOTE — PROGRESS NOTES
Patient arrived ambulatory per self for cycle 2 day 1 treatment.  Patient states he developed widespread rash post 1st vectibix treatment, saw MD last week and it will be held. Denies other new complaints or concerns.  Port accessed;specimen sent.  Labs reviewed.  Patient premedicated.  Oxaliplatin ran concurrently with Leucovorin with no sign adverse reaction;line flushed.  5 FU given IV push with no sign adverse reaction.  5 FU pump connected to infuse over 46 hours. Pump settings verified by 2nd RN. Pump infusing prior to patient leaving unit.  Patient ambulated off unit per self at discharge. Printed calendar in hand.

## 2025-01-09 ENCOUNTER — HOSPITAL ENCOUNTER (OUTPATIENT)
Dept: INFUSION THERAPY | Facility: MEDICAL CENTER | Age: 45
Discharge: HOME | End: 2025-01-09
Payer: COMMERCIAL

## 2025-01-09 VITALS
TEMPERATURE: 98.4 F | DIASTOLIC BLOOD PRESSURE: 66 MMHG | RESPIRATION RATE: 16 BRPM | SYSTOLIC BLOOD PRESSURE: 107 MMHG | HEART RATE: 75 BPM

## 2025-01-09 PROCEDURE — 2500000003 HC RX 250 WO HCPCS: Performed by: INTERNAL MEDICINE

## 2025-01-09 PROCEDURE — 6360000002 HC RX W HCPCS: Performed by: INTERNAL MEDICINE

## 2025-01-09 RX ORDER — HEPARIN 100 UNIT/ML
500 SYRINGE INTRAVENOUS PRN
Status: DISPENSED | OUTPATIENT
Start: 2025-01-09 | End: 2025-01-09

## 2025-01-09 RX ORDER — SODIUM CHLORIDE 0.9 % (FLUSH) 0.9 %
5-40 SYRINGE (ML) INJECTION PRN
Status: ACTIVE | OUTPATIENT
Start: 2025-01-09 | End: 2025-01-09

## 2025-01-09 RX ADMIN — SODIUM CHLORIDE, PRESERVATIVE FREE 10 ML: 5 INJECTION INTRAVENOUS at 13:34

## 2025-01-09 RX ADMIN — HEPARIN 500 UNITS: 100 SYRINGE at 13:34

## 2025-01-10 DIAGNOSIS — Z90.49 S/P LEFT COLECTOMY: ICD-10-CM

## 2025-01-10 DIAGNOSIS — C18.9 ADENOCARCINOMA OF COLON (HCC): Primary | ICD-10-CM

## 2025-01-13 ENCOUNTER — TELEPHONE (OUTPATIENT)
Dept: ONCOLOGY | Age: 45
End: 2025-01-13

## 2025-01-13 NOTE — TELEPHONE ENCOUNTER
[facility] Oncology Nutrition Note:   Chart reviewed and called patient for nutrition follow-up. When writer introduced self, pt stated \"I already have healthcare\" then hung up before writer could clarify. Will attempt to reach patient at a later time.     Maryan Silver RD, LD, CNSC  Registered Dietitian  [facility]  [phone]

## 2025-01-17 ENCOUNTER — OFFICE VISIT (OUTPATIENT)
Dept: PRIMARY CARE CLINIC | Age: 45
End: 2025-01-17
Payer: COMMERCIAL

## 2025-01-17 VITALS
OXYGEN SATURATION: 99 % | SYSTOLIC BLOOD PRESSURE: 129 MMHG | DIASTOLIC BLOOD PRESSURE: 94 MMHG | HEART RATE: 81 BPM | HEIGHT: 66 IN | WEIGHT: 218.2 LBS | BODY MASS INDEX: 35.07 KG/M2

## 2025-01-17 DIAGNOSIS — C18.7 CANCER OF SIGMOID (HCC): ICD-10-CM

## 2025-01-17 DIAGNOSIS — L27.0 DRUG-INDUCED SKIN RASH: Primary | ICD-10-CM

## 2025-01-17 PROCEDURE — 99213 OFFICE O/P EST LOW 20 MIN: CPT | Performed by: NURSE PRACTITIONER

## 2025-01-17 RX ORDER — HYDROXYZINE HYDROCHLORIDE 25 MG/1
25 TABLET, FILM COATED ORAL EVERY 8 HOURS PRN
Qty: 45 TABLET | Refills: 0 | Status: SHIPPED | OUTPATIENT
Start: 2025-01-17 | End: 2025-01-23

## 2025-01-17 ASSESSMENT — PATIENT HEALTH QUESTIONNAIRE - PHQ9
SUM OF ALL RESPONSES TO PHQ QUESTIONS 1-9: 1
1. LITTLE INTEREST OR PLEASURE IN DOING THINGS: NOT AT ALL
SUM OF ALL RESPONSES TO PHQ9 QUESTIONS 1 & 2: 1
2. FEELING DOWN, DEPRESSED OR HOPELESS: SEVERAL DAYS
SUM OF ALL RESPONSES TO PHQ QUESTIONS 1-9: 1

## 2025-01-17 NOTE — PROGRESS NOTES
[address] [facility] [address]   1/21/2025    Yuri Phillips is a 44 y.o. male who presents today for his medical conditions and/or complaints as noted below.    Yuri Phillips is scheduled today for Follow-up (3mo f/u)  .      HPI:     History of Present Illness  The patient is here today for a routine 3-month follow-up.    Overall, states he is doing ok.  Continue to follow with oncology for treatment of metastatic sigmoid colon cancer.  Had skin erruptions secondary to chemotherapy which have been bothersome.  Hydroxyzine was helpful in minimizing symptoms.  Requesting refill for as needed use.    Continues with home care for assistance with medications and management of colostomy.  Stoma is moist/pink today with liquid stool output.  Patient is self conscious but states he is managing.     MEDICATIONS  Current: hydroxyzine    Last A/P Oncology:  \"I reviewed labs, imaging studies, related electronic medical records including outside records and discussed the diagnosis, prognosis and treatment recommendations   I reviewed the surgical pathology results, discuss goals of care and NCCN guidelines with patient and family    44-year-old man diagnosed with stage IV sigmoid cancer status post exploratory laparotomy, resection of multiple peritoneal masses, creation of end colostomy 9/20/24, discussed with the patient the natural history of sigmoid cancer in general and the case in particular where it is metastatic and treatment is palliative in nature with systemic chemotherapy options discussed with the patient's and will proceed with FOLFOX and Avastin > changed to panitumumab due to K-rene wild-type  Chemotherapy started on October 29, 2024 and status post 4 treatments  Toxicity profile reviewed with the patient's and had severe skin rash due to panitumumab   will hold panitumumab for 1-2 treatments and resume upon improved symptoms otherwise will discontinue panitumumab and

## 2025-01-20 ENCOUNTER — TELEPHONE (OUTPATIENT)
Dept: PRIMARY CARE CLINIC | Age: 45
End: 2025-01-20

## 2025-01-20 NOTE — TELEPHONE ENCOUNTER
Lov 1/17 pt called and stated that he needs authorization of home visits from home health care in order to get services.

## 2025-01-21 ENCOUNTER — HOSPITAL ENCOUNTER (OUTPATIENT)
Dept: INFUSION THERAPY | Facility: MEDICAL CENTER | Age: 45
Discharge: HOME | End: 2025-01-21
Payer: COMMERCIAL

## 2025-01-21 VITALS
HEART RATE: 56 BPM | DIASTOLIC BLOOD PRESSURE: 69 MMHG | SYSTOLIC BLOOD PRESSURE: 116 MMHG | TEMPERATURE: 98.6 F | RESPIRATION RATE: 16 BRPM

## 2025-01-21 DIAGNOSIS — C18.7 CANCER OF SIGMOID (HCC): Primary | ICD-10-CM

## 2025-01-21 DIAGNOSIS — C23 GALLBLADDER CANCER (HCC): ICD-10-CM

## 2025-01-21 DIAGNOSIS — C23 GALLBLADDER CANCER (HCC): Primary | ICD-10-CM

## 2025-01-21 LAB
ALBUMIN SERPL-MCNC: 3.7 G/DL (ref 3.5–5.2)
ALBUMIN/GLOB SERPL: 1.1 {RATIO} (ref 1–2.5)
ALP SERPL-CCNC: 83 U/L (ref 40–129)
ALT SERPL-CCNC: 11 U/L (ref 10–50)
ANION GAP SERPL CALCULATED.3IONS-SCNC: 11 MMOL/L (ref 9–16)
AST SERPL-CCNC: 22 U/L (ref 10–50)
BASOPHILS # BLD: <0.03 K/UL (ref 0–0.2)
BASOPHILS NFR BLD: 0 % (ref 0–2)
BILIRUB SERPL-MCNC: 0.3 MG/DL (ref 0–1.2)
BUN SERPL-MCNC: 10 MG/DL (ref 6–20)
CALCIUM SERPL-MCNC: 9 MG/DL (ref 8.6–10.4)
CHLORIDE SERPL-SCNC: 105 MMOL/L (ref 98–107)
CO2 SERPL-SCNC: 25 MMOL/L (ref 20–31)
CREAT SERPL-MCNC: 1 MG/DL (ref 0.7–1.2)
EOSINOPHIL # BLD: 0.21 K/UL (ref 0–0.44)
EOSINOPHILS RELATIVE PERCENT: 5 % (ref 1–4)
ERYTHROCYTE [DISTWIDTH] IN BLOOD BY AUTOMATED COUNT: 17.3 % (ref 11.8–14.4)
GFR, ESTIMATED: >90 ML/MIN/1.73M2
GLUCOSE SERPL-MCNC: 94 MG/DL (ref 74–99)
HCT VFR BLD AUTO: 33.8 % (ref 40.7–50.3)
HGB BLD-MCNC: 11.2 G/DL (ref 13–17)
IMM GRANULOCYTES # BLD AUTO: 0.02 K/UL (ref 0–0.3)
IMM GRANULOCYTES NFR BLD: 1 %
LYMPHOCYTES NFR BLD: 1.25 K/UL (ref 1.1–3.7)
LYMPHOCYTES RELATIVE PERCENT: 32 % (ref 24–43)
MAGNESIUM SERPL-MCNC: 1.9 MG/DL (ref 1.6–2.6)
MCH RBC QN AUTO: 27.5 PG (ref 25.2–33.5)
MCHC RBC AUTO-ENTMCNC: 33.1 G/DL (ref 28.4–34.8)
MCV RBC AUTO: 83 FL (ref 82.6–102.9)
MONOCYTES NFR BLD: 0.72 K/UL (ref 0.1–1.2)
MONOCYTES NFR BLD: 18 % (ref 3–12)
NEUTROPHILS NFR BLD: 44 % (ref 36–65)
NEUTS SEG NFR BLD: 1.76 K/UL (ref 1.5–8.1)
NRBC BLD-RTO: 0 PER 100 WBC
PLATELET # BLD AUTO: 230 K/UL (ref 138–453)
PMV BLD AUTO: 8.5 FL (ref 8.1–13.5)
POTASSIUM SERPL-SCNC: 3.9 MMOL/L (ref 3.7–5.3)
PROT SERPL-MCNC: 6.9 G/DL (ref 6.6–8.7)
RBC # BLD AUTO: 4.07 M/UL (ref 4.21–5.77)
RBC # BLD: ABNORMAL 10*6/UL
SODIUM SERPL-SCNC: 140 MMOL/L (ref 136–145)
WBC OTHER # BLD: 4 K/UL (ref 3.5–11.3)

## 2025-01-21 PROCEDURE — 96375 TX/PRO/DX INJ NEW DRUG ADDON: CPT

## 2025-01-21 PROCEDURE — 96411 CHEMO IV PUSH ADDL DRUG: CPT

## 2025-01-21 PROCEDURE — 96413 CHEMO IV INFUSION 1 HR: CPT

## 2025-01-21 PROCEDURE — 83735 ASSAY OF MAGNESIUM: CPT

## 2025-01-21 PROCEDURE — 6360000002 HC RX W HCPCS: Performed by: INTERNAL MEDICINE

## 2025-01-21 PROCEDURE — 2580000003 HC RX 258: Performed by: INTERNAL MEDICINE

## 2025-01-21 PROCEDURE — 96416 CHEMO PROLONG INFUSE W/PUMP: CPT

## 2025-01-21 PROCEDURE — 96415 CHEMO IV INFUSION ADDL HR: CPT

## 2025-01-21 PROCEDURE — 96368 THER/DIAG CONCURRENT INF: CPT

## 2025-01-21 PROCEDURE — 85025 COMPLETE CBC W/AUTO DIFF WBC: CPT

## 2025-01-21 PROCEDURE — 80053 COMPREHEN METABOLIC PANEL: CPT

## 2025-01-21 RX ORDER — FLUOROURACIL 50 MG/ML
400 INJECTION, SOLUTION INTRAVENOUS ONCE
Status: CANCELLED | OUTPATIENT
Start: 2025-01-21 | End: 2025-01-21

## 2025-01-21 RX ORDER — ONDANSETRON 2 MG/ML
8 INJECTION INTRAMUSCULAR; INTRAVENOUS
Status: CANCELLED | OUTPATIENT
Start: 2025-01-21

## 2025-01-21 RX ORDER — SODIUM CHLORIDE 9 MG/ML
5-250 INJECTION, SOLUTION INTRAVENOUS PRN
Status: CANCELLED | OUTPATIENT
Start: 2025-01-21

## 2025-01-21 RX ORDER — PALONOSETRON 0.05 MG/ML
0.25 INJECTION, SOLUTION INTRAVENOUS ONCE
Status: CANCELLED | OUTPATIENT
Start: 2025-01-21 | End: 2025-01-21

## 2025-01-21 RX ORDER — PALONOSETRON 0.05 MG/ML
0.25 INJECTION, SOLUTION INTRAVENOUS ONCE
Status: COMPLETED | OUTPATIENT
Start: 2025-01-21 | End: 2025-01-21

## 2025-01-21 RX ORDER — DEXAMETHASONE SODIUM PHOSPHATE 10 MG/ML
10 INJECTION INTRAMUSCULAR; INTRAVENOUS ONCE
Status: COMPLETED | OUTPATIENT
Start: 2025-01-21 | End: 2025-01-21

## 2025-01-21 RX ORDER — SODIUM CHLORIDE 9 MG/ML
5-250 INJECTION, SOLUTION INTRAVENOUS PRN
Status: CANCELLED | OUTPATIENT
Start: 2025-01-23

## 2025-01-21 RX ORDER — HYDROCORTISONE SODIUM SUCCINATE 100 MG/2ML
100 INJECTION INTRAMUSCULAR; INTRAVENOUS
Status: CANCELLED | OUTPATIENT
Start: 2025-01-21

## 2025-01-21 RX ORDER — SODIUM CHLORIDE 0.9 % (FLUSH) 0.9 %
5-40 SYRINGE (ML) INJECTION PRN
Status: CANCELLED | OUTPATIENT
Start: 2025-01-21

## 2025-01-21 RX ORDER — ACETAMINOPHEN 325 MG/1
650 TABLET ORAL
Status: CANCELLED | OUTPATIENT
Start: 2025-01-21

## 2025-01-21 RX ORDER — ALBUTEROL SULFATE 90 UG/1
4 INHALANT RESPIRATORY (INHALATION) PRN
Status: CANCELLED | OUTPATIENT
Start: 2025-01-21

## 2025-01-21 RX ORDER — DIPHENHYDRAMINE HYDROCHLORIDE 50 MG/ML
50 INJECTION INTRAMUSCULAR; INTRAVENOUS
Status: CANCELLED | OUTPATIENT
Start: 2025-01-21

## 2025-01-21 RX ORDER — DEXTROSE MONOHYDRATE 50 MG/ML
5-250 INJECTION, SOLUTION INTRAVENOUS PRN
Status: DISCONTINUED | OUTPATIENT
Start: 2025-01-21 | End: 2025-01-22 | Stop reason: HOSPADM

## 2025-01-21 RX ORDER — FLUOROURACIL 50 MG/ML
400 INJECTION, SOLUTION INTRAVENOUS ONCE
Status: COMPLETED | OUTPATIENT
Start: 2025-01-21 | End: 2025-01-21

## 2025-01-21 RX ORDER — DEXTROSE MONOHYDRATE 50 MG/ML
5-250 INJECTION, SOLUTION INTRAVENOUS PRN
Status: CANCELLED | OUTPATIENT
Start: 2025-01-21

## 2025-01-21 RX ORDER — MEPERIDINE HYDROCHLORIDE 50 MG/ML
12.5 INJECTION INTRAMUSCULAR; INTRAVENOUS; SUBCUTANEOUS PRN
Status: CANCELLED | OUTPATIENT
Start: 2025-01-21

## 2025-01-21 RX ORDER — HEPARIN SODIUM (PORCINE) LOCK FLUSH IV SOLN 100 UNIT/ML 100 UNIT/ML
500 SOLUTION INTRAVENOUS PRN
Status: CANCELLED | OUTPATIENT
Start: 2025-01-23

## 2025-01-21 RX ORDER — EPINEPHRINE 1 MG/ML
0.3 INJECTION, SOLUTION, CONCENTRATE INTRAVENOUS PRN
Status: CANCELLED | OUTPATIENT
Start: 2025-01-21

## 2025-01-21 RX ORDER — FAMOTIDINE 10 MG/ML
20 INJECTION, SOLUTION INTRAVENOUS
Status: CANCELLED | OUTPATIENT
Start: 2025-01-21

## 2025-01-21 RX ORDER — SODIUM CHLORIDE 9 MG/ML
INJECTION, SOLUTION INTRAVENOUS CONTINUOUS
Status: CANCELLED | OUTPATIENT
Start: 2025-01-21

## 2025-01-21 RX ORDER — SODIUM CHLORIDE 0.9 % (FLUSH) 0.9 %
5-40 SYRINGE (ML) INJECTION PRN
Status: CANCELLED | OUTPATIENT
Start: 2025-01-23

## 2025-01-21 RX ORDER — HEPARIN SODIUM (PORCINE) LOCK FLUSH IV SOLN 100 UNIT/ML 100 UNIT/ML
500 SOLUTION INTRAVENOUS PRN
Status: CANCELLED | OUTPATIENT
Start: 2025-01-21

## 2025-01-21 RX ADMIN — FLUOROURACIL 5000 MG: 50 INJECTION, SOLUTION INTRAVENOUS at 13:58

## 2025-01-21 RX ADMIN — DEXTROSE MONOHYDRATE 100 ML/HR: 50 INJECTION, SOLUTION INTRAVENOUS at 11:21

## 2025-01-21 RX ADMIN — FLUOROURACIL 850 MG: 50 INJECTION, SOLUTION INTRAVENOUS at 13:58

## 2025-01-21 RX ADMIN — LEUCOVORIN CALCIUM 800 MG: 100 INJECTION, POWDER, LYOPHILIZED, FOR SUSPENSION INTRAMUSCULAR; INTRAVENOUS at 11:35

## 2025-01-21 RX ADMIN — PALONOSETRON 0.25 MG: 0.05 INJECTION, SOLUTION INTRAVENOUS at 10:38

## 2025-01-21 RX ADMIN — DEXAMETHASONE SODIUM PHOSPHATE 10 MG: 10 INJECTION INTRAMUSCULAR; INTRAVENOUS at 10:37

## 2025-01-21 RX ADMIN — OXALIPLATIN 180 MG: 5 INJECTION, SOLUTION INTRAVENOUS at 11:37

## 2025-01-21 ASSESSMENT — ENCOUNTER SYMPTOMS
CHEST TIGHTNESS: 0
VOMITING: 0
BACK PAIN: 0
ABDOMINAL PAIN: 0
SHORTNESS OF BREATH: 0
DIARRHEA: 0
SINUS PRESSURE: 0
NAUSEA: 0
COUGH: 0
COLOR CHANGE: 0
SINUS PAIN: 0
SORE THROAT: 0
PHOTOPHOBIA: 0

## 2025-01-21 NOTE — PROGRESS NOTES
Patient arrive ambulatory for cycle 3 day 1 treatment.   Pt continues to have a rash on his face and chest .   Denies any other complaint or concern.  Vitals as charted.  Port accessed; specimen sent.  Labs reviewed  Writer informed MD of concerns , orders received to hold Vectibix today.   Patient premedicated.  Oxaliplatin infused concurrently with leucovorin; completed with no adverse reaction; line flushed.  5FU push completed with no adverse reaction;  5FU pump connected to infuse over 46 hours; pump infusing prior to discharge from unit; denies questions as he has had pump previously.  Patient discharge.

## 2025-01-23 ENCOUNTER — TELEPHONE (OUTPATIENT)
Dept: ONCOLOGY | Age: 45
End: 2025-01-23

## 2025-01-23 ENCOUNTER — HOSPITAL ENCOUNTER (OUTPATIENT)
Facility: MEDICAL CENTER | Age: 45
End: 2025-01-23
Payer: COMMERCIAL

## 2025-01-23 ENCOUNTER — HOSPITAL ENCOUNTER (OUTPATIENT)
Dept: INFUSION THERAPY | Facility: MEDICAL CENTER | Age: 45
Discharge: HOME | End: 2025-01-23
Payer: COMMERCIAL

## 2025-01-23 ENCOUNTER — OFFICE VISIT (OUTPATIENT)
Dept: ONCOLOGY | Age: 45
End: 2025-01-23
Payer: COMMERCIAL

## 2025-01-23 VITALS
DIASTOLIC BLOOD PRESSURE: 73 MMHG | HEART RATE: 75 BPM | SYSTOLIC BLOOD PRESSURE: 113 MMHG | WEIGHT: 221.1 LBS | RESPIRATION RATE: 16 BRPM | BODY MASS INDEX: 35.69 KG/M2 | TEMPERATURE: 99.7 F

## 2025-01-23 VITALS — TEMPERATURE: 98.6 F

## 2025-01-23 DIAGNOSIS — T45.1X5A ANEMIA ASSOCIATED WITH CHEMOTHERAPY: ICD-10-CM

## 2025-01-23 DIAGNOSIS — D64.81 ANEMIA ASSOCIATED WITH CHEMOTHERAPY: ICD-10-CM

## 2025-01-23 DIAGNOSIS — C77.1 METASTASIS TO MEDIASTINAL LYMPH NODE (HCC): Primary | ICD-10-CM

## 2025-01-23 DIAGNOSIS — T45.1X5D ADVERSE EFFECT OF CHEMOTHERAPY, SUBSEQUENT ENCOUNTER: ICD-10-CM

## 2025-01-23 DIAGNOSIS — C18.7 CANCER OF SIGMOID (HCC): Primary | ICD-10-CM

## 2025-01-23 DIAGNOSIS — D64.9 NORMOCYTIC ANEMIA: ICD-10-CM

## 2025-01-23 DIAGNOSIS — C77.1 METASTASIS TO MEDIASTINAL LYMPH NODE (HCC): ICD-10-CM

## 2025-01-23 DIAGNOSIS — C18.7 CANCER OF SIGMOID (HCC): ICD-10-CM

## 2025-01-23 LAB — CEA SERPL-MCNC: 194 NG/ML (ref 0–3.8)

## 2025-01-23 PROCEDURE — 99215 OFFICE O/P EST HI 40 MIN: CPT | Performed by: INTERNAL MEDICINE

## 2025-01-23 PROCEDURE — 96374 THER/PROPH/DIAG INJ IV PUSH: CPT

## 2025-01-23 PROCEDURE — 2500000003 HC RX 250 WO HCPCS: Performed by: INTERNAL MEDICINE

## 2025-01-23 PROCEDURE — 99211 OFF/OP EST MAY X REQ PHY/QHP: CPT

## 2025-01-23 PROCEDURE — 6360000002 HC RX W HCPCS: Performed by: INTERNAL MEDICINE

## 2025-01-23 PROCEDURE — 82378 CARCINOEMBRYONIC ANTIGEN: CPT

## 2025-01-23 RX ORDER — SODIUM CHLORIDE 9 MG/ML
25 INJECTION, SOLUTION INTRAVENOUS PRN
OUTPATIENT
Start: 2025-01-23

## 2025-01-23 RX ORDER — SODIUM CHLORIDE 0.9 % (FLUSH) 0.9 %
5-40 SYRINGE (ML) INJECTION PRN
Status: DISCONTINUED | OUTPATIENT
Start: 2025-01-23 | End: 2025-01-24 | Stop reason: HOSPADM

## 2025-01-23 RX ORDER — ONDANSETRON 2 MG/ML
8 INJECTION INTRAMUSCULAR; INTRAVENOUS
Status: COMPLETED | OUTPATIENT
Start: 2025-01-23 | End: 2025-01-23

## 2025-01-23 RX ORDER — HEPARIN 100 UNIT/ML
500 SYRINGE INTRAVENOUS PRN
OUTPATIENT
Start: 2025-01-23

## 2025-01-23 RX ORDER — SODIUM CHLORIDE 0.9 % (FLUSH) 0.9 %
5-40 SYRINGE (ML) INJECTION PRN
OUTPATIENT
Start: 2025-01-23

## 2025-01-23 RX ORDER — ONDANSETRON 2 MG/ML
8 INJECTION INTRAMUSCULAR; INTRAVENOUS
OUTPATIENT
Start: 2025-01-23

## 2025-01-23 RX ORDER — HEPARIN 100 UNIT/ML
500 SYRINGE INTRAVENOUS PRN
Status: DISCONTINUED | OUTPATIENT
Start: 2025-01-23 | End: 2025-01-24 | Stop reason: HOSPADM

## 2025-01-23 RX ADMIN — SODIUM CHLORIDE, PRESERVATIVE FREE 20 ML: 5 INJECTION INTRAVENOUS at 15:22

## 2025-01-23 RX ADMIN — ONDANSETRON 8 MG: 2 INJECTION INTRAMUSCULAR; INTRAVENOUS at 15:21

## 2025-01-23 RX ADMIN — HEPARIN 500 UNITS: 100 SYRINGE at 15:22

## 2025-01-23 NOTE — PROGRESS NOTES
B: UMBILICAL MASS       Gross Description   A.  \"MARCELO DENIS, PERITONEUM MASS\" Received in formalin is a 4.5 x   4.0 x 1.5 cm aggregate of yellow-red, lobulated, fatty to fibrous   tissue.  Sectioning reveals bright yellow to fibrotic cut surfaces.   Totally embedded 4c.   B.  \"MARCELO DENIS, UMBILICAL MASS\" Received in formalin are two   fragments of yellow, lobulated to fibrotic tissue fragments, 1.7 cm   and 2.0 cm.  Sectioning reveals tan, rubbery, fibrotic to lobulated   and fatty cut surfaces.  Totally embedded 2c.  tm     Prachi Ferrer/se:9/20/2024   Microscopic Description   A, B. Microscopic examination performed. Immunostains were performed   with appropriate controls on block B2. Neoplastic cells are positive   for CK20 (weak, focal), CDX2, and SATB2.     Block for additional testing: B2         Processing Lab:  [address] [id number]   Interpretation Performed at [address] [id number]             IMAGING DATA:      IR PORT PLACEMENT > 5 YEARS  Narrative: PROCEDURE:  ULTRASOUND GUIDED VASCULAR ACCESS.  FLUOROSCOPY GUIDED PLACEMENT OF A SUBCUTANEOUS PORT-A-CATH.    MODERATE CONSCIOUS SEDATION    10/22/2024.    HISTORY:  ORDERING SYSTEM PROVIDED HISTORY: Carcinoma of sigmoid colon (HCC)    SEDATION:  Moderate sedation was ordered and supervised by the attending with physician  face-to-face monitoring. Medications were provided and recorded by Radiology  nurses.    FLUOROSCOPY DOSE AND TYPE:    Radiation Exposure Index: Kerma mGy, 0.8    TECHNIQUE:  Informed consent was obtained after a detailed explanation of the procedure  including risks, benefits, and alternatives. All aspects of maximum sterile  barrier technique were used including washing hands with conventional soap  and water or with alcohol-based hand rubs (ABHR), skin preparation, cap,  mask, sterile gown, sterile gloves, and sterile full

## 2025-01-23 NOTE — PROGRESS NOTES
Patient arrive ambulatory for chemo pump removal after MD visit in front office.  Patient had vomiting episode in CC bathroom. Denies other complaint or concern. He did not take any PO zofran today.  Vitals as charted.  Per Dr. Sanchez, can give IV zofran and draw CEA today.  Pump cartridge empty upon arrival to unit.  Pump removed; port flushed, specimen collected and sent, zofran given via IVP (Patient educated to not take PO zofran for 8 hours), flushed and heparinized with intact sprague needle removed per protocol.  Patient ambulate off unit per self at discharge with instructions to grab AVS in front office.

## 2025-01-23 NOTE — TELEPHONE ENCOUNTER
MARCELO HATHAWAY FOR MD VISIT & TX  CEA   Ct chest abdomen pelvis prior to next visit  Rtc in 2 weeks  Add avastin and dc panitumumab  LAB CEA DONE ON EXIT  CT CHEST IS ON 1/31/25 @ 1PM AT [facility] ARRIVAL @ 12PM  MD VISIT 2/6/25 @ 9AM TX @ 3:30PM  AVS PRINTED W/ INSTRUCTIONS AND GIVEN TO PT ON EXIT

## 2025-01-27 ENCOUNTER — TELEPHONE (OUTPATIENT)
Dept: ONCOLOGY | Age: 45
End: 2025-01-27

## 2025-01-27 NOTE — TELEPHONE ENCOUNTER
Name: Yuri Phillips  : 1980  MRN: [id number]    Oncology Navigation Follow-Up Note    Contact Type:  Telephone    Notes: Writer called pt to check on him and to see how he's feeling. He states overall he's doing well. He said he had a lot of gas last week for 2 days and did vomit a couple of times. He states he never had to use his zofran. He reports feeling better now and thinks he over did it on ginger ale last week. Pt appreciative of f/u call. Will continue to follow.      Electronically signed by Jovanna Carter RN on 2025 at 12:22 PM

## 2025-01-27 NOTE — TELEPHONE ENCOUNTER
[facility]- MEDICAL NUTRITION THERAPY     Visit Type: Follow-up     NUTRITION RECOMMENDATIONS / MONITORING / EVALUATION  Encouraged continuation of small frequent meal intake as tolerated. Discussed choosing bland foods if/when experiencing nausea/vomiting.  Will continue to monitor PO tolerance, adequacy of intake, weight, and care plans.     Subjective/Current Data:  Yuri Phillips is a 44 y.o. male with sigmoid cancer, ex lap with resection of multiple peritoneal masses, end colostomy, chemotherapy initiated 10/29/24.   Chart reviewed and called patient for nutrition follow-up. Pt reports episode of emesis and a lot of gas a couple days ago; states he took Pepto Bismol which helped. No N/V/D abdominal discomfort at present. Per pt, aside from that one incident, he has been tolerating a regular diet well. Eats small frequent meals throughout the day to prevent overeating which causes increased ostomy output. Pt states output has been normal with several small meals/day. Avoids popcorn and seeds, but does occasionally eat chocolate covered walnuts (3-5 pieces at a time). Weight record reviewed: noted recent weight gain.     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

## 2025-01-28 DIAGNOSIS — C18.7 CANCER OF SIGMOID (HCC): Primary | ICD-10-CM

## 2025-01-28 RX ORDER — FLUOROURACIL 50 MG/ML
400 INJECTION, SOLUTION INTRAVENOUS ONCE
OUTPATIENT
Start: 2025-02-18 | End: 2025-02-18

## 2025-01-28 RX ORDER — DIPHENHYDRAMINE HYDROCHLORIDE 50 MG/ML
50 INJECTION INTRAMUSCULAR; INTRAVENOUS
OUTPATIENT
Start: 2025-02-04

## 2025-01-28 RX ORDER — HYDROCORTISONE SODIUM SUCCINATE 100 MG/2ML
100 INJECTION INTRAMUSCULAR; INTRAVENOUS
OUTPATIENT
Start: 2025-02-04

## 2025-01-28 RX ORDER — HYDROCORTISONE SODIUM SUCCINATE 100 MG/2ML
100 INJECTION INTRAMUSCULAR; INTRAVENOUS
OUTPATIENT
Start: 2025-02-18

## 2025-01-28 RX ORDER — ALBUTEROL SULFATE 90 UG/1
4 INHALANT RESPIRATORY (INHALATION) PRN
OUTPATIENT
Start: 2025-02-04

## 2025-01-28 RX ORDER — SODIUM CHLORIDE 9 MG/ML
INJECTION, SOLUTION INTRAVENOUS CONTINUOUS
OUTPATIENT
Start: 2025-02-04

## 2025-01-28 RX ORDER — SODIUM CHLORIDE 9 MG/ML
5-250 INJECTION, SOLUTION INTRAVENOUS PRN
OUTPATIENT
Start: 2025-02-04

## 2025-01-28 RX ORDER — ALBUTEROL SULFATE 90 UG/1
4 INHALANT RESPIRATORY (INHALATION) PRN
OUTPATIENT
Start: 2025-02-18

## 2025-01-28 RX ORDER — ACETAMINOPHEN 325 MG/1
650 TABLET ORAL
OUTPATIENT
Start: 2025-02-18

## 2025-01-28 RX ORDER — SODIUM CHLORIDE 9 MG/ML
INJECTION, SOLUTION INTRAVENOUS CONTINUOUS
OUTPATIENT
Start: 2025-02-18

## 2025-01-28 RX ORDER — FLUOROURACIL 50 MG/ML
400 INJECTION, SOLUTION INTRAVENOUS ONCE
OUTPATIENT
Start: 2025-02-04 | End: 2025-02-04

## 2025-01-28 RX ORDER — ONDANSETRON 2 MG/ML
8 INJECTION INTRAMUSCULAR; INTRAVENOUS
OUTPATIENT
Start: 2025-02-18

## 2025-01-28 RX ORDER — DEXTROSE MONOHYDRATE 50 MG/ML
5-250 INJECTION, SOLUTION INTRAVENOUS PRN
OUTPATIENT
Start: 2025-02-18

## 2025-01-28 RX ORDER — HEPARIN SODIUM (PORCINE) LOCK FLUSH IV SOLN 100 UNIT/ML 100 UNIT/ML
500 SOLUTION INTRAVENOUS PRN
OUTPATIENT
Start: 2025-02-18

## 2025-01-28 RX ORDER — SODIUM CHLORIDE 0.9 % (FLUSH) 0.9 %
5-40 SYRINGE (ML) INJECTION PRN
OUTPATIENT
Start: 2025-02-04

## 2025-01-28 RX ORDER — SODIUM CHLORIDE 9 MG/ML
5-250 INJECTION, SOLUTION INTRAVENOUS PRN
OUTPATIENT
Start: 2025-02-18

## 2025-01-28 RX ORDER — HEPARIN SODIUM (PORCINE) LOCK FLUSH IV SOLN 100 UNIT/ML 100 UNIT/ML
500 SOLUTION INTRAVENOUS PRN
OUTPATIENT
Start: 2025-02-06

## 2025-01-28 RX ORDER — ONDANSETRON 2 MG/ML
8 INJECTION INTRAMUSCULAR; INTRAVENOUS
OUTPATIENT
Start: 2025-02-04

## 2025-01-28 RX ORDER — MEPERIDINE HYDROCHLORIDE 50 MG/ML
12.5 INJECTION INTRAMUSCULAR; INTRAVENOUS; SUBCUTANEOUS PRN
OUTPATIENT
Start: 2025-02-04

## 2025-01-28 RX ORDER — DEXTROSE MONOHYDRATE 50 MG/ML
5-250 INJECTION, SOLUTION INTRAVENOUS PRN
OUTPATIENT
Start: 2025-02-04

## 2025-01-28 RX ORDER — HEPARIN SODIUM (PORCINE) LOCK FLUSH IV SOLN 100 UNIT/ML 100 UNIT/ML
500 SOLUTION INTRAVENOUS PRN
OUTPATIENT
Start: 2025-02-20

## 2025-01-28 RX ORDER — SODIUM CHLORIDE 9 MG/ML
5-250 INJECTION, SOLUTION INTRAVENOUS PRN
OUTPATIENT
Start: 2025-02-20

## 2025-01-28 RX ORDER — EPINEPHRINE 1 MG/ML
0.3 INJECTION, SOLUTION, CONCENTRATE INTRAVENOUS PRN
OUTPATIENT
Start: 2025-02-04

## 2025-01-28 RX ORDER — DIPHENHYDRAMINE HYDROCHLORIDE 50 MG/ML
50 INJECTION INTRAMUSCULAR; INTRAVENOUS
OUTPATIENT
Start: 2025-02-18

## 2025-01-28 RX ORDER — SODIUM CHLORIDE 0.9 % (FLUSH) 0.9 %
5-40 SYRINGE (ML) INJECTION PRN
OUTPATIENT
Start: 2025-02-20

## 2025-01-28 RX ORDER — SODIUM CHLORIDE 0.9 % (FLUSH) 0.9 %
5-40 SYRINGE (ML) INJECTION PRN
OUTPATIENT
Start: 2025-02-06

## 2025-01-28 RX ORDER — MEPERIDINE HYDROCHLORIDE 50 MG/ML
12.5 INJECTION INTRAMUSCULAR; INTRAVENOUS; SUBCUTANEOUS PRN
OUTPATIENT
Start: 2025-02-18

## 2025-01-28 RX ORDER — PALONOSETRON 0.05 MG/ML
0.25 INJECTION, SOLUTION INTRAVENOUS ONCE
OUTPATIENT
Start: 2025-02-04 | End: 2025-02-04

## 2025-01-28 RX ORDER — FAMOTIDINE 10 MG/ML
20 INJECTION, SOLUTION INTRAVENOUS
OUTPATIENT
Start: 2025-02-18

## 2025-01-28 RX ORDER — HEPARIN SODIUM (PORCINE) LOCK FLUSH IV SOLN 100 UNIT/ML 100 UNIT/ML
500 SOLUTION INTRAVENOUS PRN
OUTPATIENT
Start: 2025-02-04

## 2025-01-28 RX ORDER — SODIUM CHLORIDE 9 MG/ML
5-250 INJECTION, SOLUTION INTRAVENOUS PRN
OUTPATIENT
Start: 2025-02-06

## 2025-01-28 RX ORDER — EPINEPHRINE 1 MG/ML
0.3 INJECTION, SOLUTION, CONCENTRATE INTRAVENOUS PRN
OUTPATIENT
Start: 2025-02-18

## 2025-01-28 RX ORDER — ACETAMINOPHEN 325 MG/1
650 TABLET ORAL
OUTPATIENT
Start: 2025-02-04

## 2025-01-28 RX ORDER — PALONOSETRON 0.05 MG/ML
0.25 INJECTION, SOLUTION INTRAVENOUS ONCE
OUTPATIENT
Start: 2025-02-18 | End: 2025-02-18

## 2025-01-28 RX ORDER — FAMOTIDINE 10 MG/ML
20 INJECTION, SOLUTION INTRAVENOUS
OUTPATIENT
Start: 2025-02-04

## 2025-01-28 RX ORDER — SODIUM CHLORIDE 0.9 % (FLUSH) 0.9 %
5-40 SYRINGE (ML) INJECTION PRN
OUTPATIENT
Start: 2025-02-18

## 2025-01-31 ENCOUNTER — HOSPITAL ENCOUNTER (OUTPATIENT)
Dept: CT IMAGING | Age: 45
Discharge: HOME | End: 2025-01-31
Attending: INTERNAL MEDICINE
Payer: COMMERCIAL

## 2025-01-31 DIAGNOSIS — C77.1 METASTASIS TO MEDIASTINAL LYMPH NODE (HCC): ICD-10-CM

## 2025-01-31 PROCEDURE — 71260 CT THORAX DX C+: CPT

## 2025-01-31 PROCEDURE — 74177 CT ABD & PELVIS W/CONTRAST: CPT

## 2025-01-31 PROCEDURE — 6360000004 HC RX CONTRAST MEDICATION: Performed by: INTERNAL MEDICINE

## 2025-01-31 PROCEDURE — 2500000003 HC RX 250 WO HCPCS: Performed by: INTERNAL MEDICINE

## 2025-01-31 RX ORDER — IOPAMIDOL 755 MG/ML
100 INJECTION, SOLUTION INTRAVASCULAR
Status: COMPLETED | OUTPATIENT
Start: 2025-01-31 | End: 2025-01-31

## 2025-01-31 RX ADMIN — IOPAMIDOL 100 ML: 755 INJECTION, SOLUTION INTRAVENOUS at 15:14

## 2025-01-31 RX ADMIN — BARIUM SULFATE 450 ML: 20 SUSPENSION ORAL at 15:14

## 2025-02-03 ENCOUNTER — HOSPITAL ENCOUNTER (OUTPATIENT)
Facility: MEDICAL CENTER | Age: 45
End: 2025-02-03
Payer: COMMERCIAL

## 2025-02-04 ENCOUNTER — HOSPITAL ENCOUNTER (OUTPATIENT)
Dept: INFUSION THERAPY | Facility: MEDICAL CENTER | Age: 45
Discharge: HOME | End: 2025-02-04
Payer: COMMERCIAL

## 2025-02-04 VITALS
TEMPERATURE: 98.3 F | RESPIRATION RATE: 16 BRPM | HEART RATE: 94 BPM | SYSTOLIC BLOOD PRESSURE: 115 MMHG | DIASTOLIC BLOOD PRESSURE: 81 MMHG

## 2025-02-04 DIAGNOSIS — C18.7 CANCER OF SIGMOID (HCC): Primary | ICD-10-CM

## 2025-02-04 DIAGNOSIS — C23 GALLBLADDER CANCER (HCC): ICD-10-CM

## 2025-02-04 LAB
ALBUMIN SERPL-MCNC: 4.1 G/DL (ref 3.5–5.2)
ALBUMIN/GLOB SERPL: 1.1 {RATIO} (ref 1–2.5)
ALP SERPL-CCNC: 91 U/L (ref 40–129)
ALT SERPL-CCNC: 60 U/L (ref 10–50)
ANION GAP SERPL CALCULATED.3IONS-SCNC: 10 MMOL/L (ref 9–16)
AST SERPL-CCNC: 59 U/L (ref 10–50)
BASOPHILS # BLD: <0.03 K/UL (ref 0–0.2)
BASOPHILS NFR BLD: 1 % (ref 0–2)
BILIRUB SERPL-MCNC: 0.3 MG/DL (ref 0–1.2)
BILIRUB UR QL STRIP: NEGATIVE
BUN SERPL-MCNC: 12 MG/DL (ref 6–20)
CALCIUM SERPL-MCNC: 9.5 MG/DL (ref 8.6–10.4)
CHLORIDE SERPL-SCNC: 104 MMOL/L (ref 98–107)
CLARITY UR: ABNORMAL
CO2 SERPL-SCNC: 24 MMOL/L (ref 20–31)
COLOR UR: YELLOW
CREAT SERPL-MCNC: 1 MG/DL (ref 0.7–1.2)
CRYSTALS URNS MICRO: ABNORMAL /HPF
EOSINOPHIL # BLD: 0.08 K/UL (ref 0–0.44)
EOSINOPHILS RELATIVE PERCENT: 2 % (ref 1–4)
EPI CELLS #/AREA URNS HPF: ABNORMAL /HPF (ref 0–5)
ERYTHROCYTE [DISTWIDTH] IN BLOOD BY AUTOMATED COUNT: 19 % (ref 11.8–14.4)
GFR, ESTIMATED: >90 ML/MIN/1.73M2
GLUCOSE SERPL-MCNC: 102 MG/DL (ref 74–99)
GLUCOSE UR STRIP-MCNC: NEGATIVE MG/DL
HCT VFR BLD AUTO: 37 % (ref 40.7–50.3)
HGB BLD-MCNC: 12.3 G/DL (ref 13–17)
HGB UR QL STRIP.AUTO: NEGATIVE
IMM GRANULOCYTES # BLD AUTO: 0.02 K/UL (ref 0–0.3)
IMM GRANULOCYTES NFR BLD: 1 %
KETONES UR STRIP-MCNC: NEGATIVE MG/DL
LEUKOCYTE ESTERASE UR QL STRIP: NEGATIVE
LYMPHOCYTES NFR BLD: 1.38 K/UL (ref 1.1–3.7)
LYMPHOCYTES RELATIVE PERCENT: 31 % (ref 24–43)
MAGNESIUM SERPL-MCNC: 2 MG/DL (ref 1.6–2.6)
MCH RBC QN AUTO: 28 PG (ref 25.2–33.5)
MCHC RBC AUTO-ENTMCNC: 33.2 G/DL (ref 28.4–34.8)
MCV RBC AUTO: 84.1 FL (ref 82.6–102.9)
MONOCYTES NFR BLD: 0.76 K/UL (ref 0.1–1.2)
MONOCYTES NFR BLD: 17 % (ref 3–12)
NEUTROPHILS NFR BLD: 48 % (ref 36–65)
NEUTS SEG NFR BLD: 2.13 K/UL (ref 1.5–8.1)
NITRITE UR QL STRIP: NEGATIVE
NRBC BLD-RTO: 0 PER 100 WBC
PH UR STRIP: 6 [PH] (ref 5–8)
PLATELET # BLD AUTO: 235 K/UL (ref 138–453)
PMV BLD AUTO: 8.2 FL (ref 8.1–13.5)
POTASSIUM SERPL-SCNC: 4.2 MMOL/L (ref 3.7–5.3)
PROT SERPL-MCNC: 7.7 G/DL (ref 6.6–8.7)
PROT UR STRIP-MCNC: ABNORMAL MG/DL
RBC # BLD AUTO: 4.4 M/UL (ref 4.21–5.77)
RBC # BLD: ABNORMAL 10*6/UL
RBC #/AREA URNS HPF: ABNORMAL /HPF (ref 0–2)
SODIUM SERPL-SCNC: 139 MMOL/L (ref 136–145)
SP GR UR STRIP: 1.05 (ref 1–1.03)
UROBILINOGEN UR STRIP-ACNC: NORMAL EU/DL (ref 0–1)
WBC #/AREA URNS HPF: ABNORMAL /HPF (ref 0–5)
WBC OTHER # BLD: 4.4 K/UL (ref 3.5–11.3)

## 2025-02-04 PROCEDURE — 6360000002 HC RX W HCPCS: Performed by: INTERNAL MEDICINE

## 2025-02-04 PROCEDURE — 96417 CHEMO IV INFUS EACH ADDL SEQ: CPT

## 2025-02-04 PROCEDURE — 2500000003 HC RX 250 WO HCPCS: Performed by: INTERNAL MEDICINE

## 2025-02-04 PROCEDURE — 83735 ASSAY OF MAGNESIUM: CPT

## 2025-02-04 PROCEDURE — 96413 CHEMO IV INFUSION 1 HR: CPT

## 2025-02-04 PROCEDURE — 85025 COMPLETE CBC W/AUTO DIFF WBC: CPT

## 2025-02-04 PROCEDURE — 2580000003 HC RX 258: Performed by: INTERNAL MEDICINE

## 2025-02-04 PROCEDURE — 96409 CHEMO IV PUSH SNGL DRUG: CPT

## 2025-02-04 PROCEDURE — 96368 THER/DIAG CONCURRENT INF: CPT

## 2025-02-04 PROCEDURE — 96415 CHEMO IV INFUSION ADDL HR: CPT

## 2025-02-04 PROCEDURE — 80053 COMPREHEN METABOLIC PANEL: CPT

## 2025-02-04 PROCEDURE — 96416 CHEMO PROLONG INFUSE W/PUMP: CPT

## 2025-02-04 PROCEDURE — 81001 URINALYSIS AUTO W/SCOPE: CPT

## 2025-02-04 PROCEDURE — 96375 TX/PRO/DX INJ NEW DRUG ADDON: CPT

## 2025-02-04 RX ORDER — DEXAMETHASONE SODIUM PHOSPHATE 10 MG/ML
10 INJECTION INTRAMUSCULAR; INTRAVENOUS ONCE
Status: COMPLETED | OUTPATIENT
Start: 2025-02-04 | End: 2025-02-04

## 2025-02-04 RX ORDER — DEXTROSE MONOHYDRATE 50 MG/ML
5-250 INJECTION, SOLUTION INTRAVENOUS PRN
Status: DISCONTINUED | OUTPATIENT
Start: 2025-02-04 | End: 2025-02-05 | Stop reason: HOSPADM

## 2025-02-04 RX ORDER — SODIUM CHLORIDE 9 MG/ML
5-250 INJECTION, SOLUTION INTRAVENOUS PRN
Status: DISCONTINUED | OUTPATIENT
Start: 2025-02-04 | End: 2025-02-05 | Stop reason: HOSPADM

## 2025-02-04 RX ORDER — HEPARIN 100 UNIT/ML
500 SYRINGE INTRAVENOUS PRN
Status: DISCONTINUED | OUTPATIENT
Start: 2025-02-04 | End: 2025-02-05 | Stop reason: HOSPADM

## 2025-02-04 RX ORDER — SODIUM CHLORIDE 0.9 % (FLUSH) 0.9 %
5-40 SYRINGE (ML) INJECTION PRN
Status: DISCONTINUED | OUTPATIENT
Start: 2025-02-04 | End: 2025-02-05 | Stop reason: HOSPADM

## 2025-02-04 RX ORDER — PALONOSETRON 0.05 MG/ML
0.25 INJECTION, SOLUTION INTRAVENOUS ONCE
Status: COMPLETED | OUTPATIENT
Start: 2025-02-04 | End: 2025-02-04

## 2025-02-04 RX ORDER — FLUOROURACIL 50 MG/ML
400 INJECTION, SOLUTION INTRAVENOUS ONCE
Status: COMPLETED | OUTPATIENT
Start: 2025-02-04 | End: 2025-02-04

## 2025-02-04 RX ADMIN — FLUOROURACIL 850 MG: 50 INJECTION, SOLUTION INTRAVENOUS at 14:39

## 2025-02-04 RX ADMIN — DEXAMETHASONE SODIUM PHOSPHATE 10 MG: 10 INJECTION INTRAMUSCULAR; INTRAVENOUS at 10:57

## 2025-02-04 RX ADMIN — LEUCOVORIN CALCIUM 800 MG: 350 INJECTION, POWDER, LYOPHILIZED, FOR SUSPENSION INTRAMUSCULAR; INTRAVENOUS at 12:18

## 2025-02-04 RX ADMIN — SODIUM CHLORIDE 500 MG: 9 INJECTION, SOLUTION INTRAVENOUS at 11:30

## 2025-02-04 RX ADMIN — SODIUM CHLORIDE, PRESERVATIVE FREE 10 ML: 5 INJECTION INTRAVENOUS at 10:00

## 2025-02-04 RX ADMIN — FLUOROURACIL 5000 MG: 50 INJECTION, SOLUTION INTRAVENOUS at 14:38

## 2025-02-04 RX ADMIN — DEXTROSE MONOHYDRATE 50 ML/HR: 50 INJECTION, SOLUTION INTRAVENOUS at 12:14

## 2025-02-04 RX ADMIN — SODIUM CHLORIDE 50 ML/HR: 9 INJECTION, SOLUTION INTRAVENOUS at 10:00

## 2025-02-04 RX ADMIN — OXALIPLATIN 180 MG: 5 INJECTION, SOLUTION INTRAVENOUS at 12:19

## 2025-02-04 RX ADMIN — PALONOSETRON 0.25 MG: 0.05 INJECTION, SOLUTION INTRAVENOUS at 10:57

## 2025-02-04 NOTE — PROGRESS NOTES
Patient arrived ambulatory per self for  cycle 1 day 1 treatment.  Patient denies complaints or concerns.  Port accessed;specimen sent.  Labs reviewed.   Patient premedicated.  Zirabev infused with no sign adverse reaction;line flushed.  Oxaliplatin ran concurrently with Leucovorin with no sign adverse reaction;line flushed.  5 FU given IV push with no sign adverse reaction.  Adrucil pump connected to infuse over 46 hours. Pump settings verified by 2nd RN. Pump running prior to patient leaving unit.  Patient ambulated off unit per self at discharge. Instructed to stop at  for AVS.

## 2025-02-06 ENCOUNTER — TELEPHONE (OUTPATIENT)
Dept: ONCOLOGY | Age: 45
End: 2025-02-06

## 2025-02-06 ENCOUNTER — HOSPITAL ENCOUNTER (OUTPATIENT)
Dept: INFUSION THERAPY | Facility: MEDICAL CENTER | Age: 45
Discharge: HOME | End: 2025-02-06
Payer: COMMERCIAL

## 2025-02-06 ENCOUNTER — OFFICE VISIT (OUTPATIENT)
Dept: ONCOLOGY | Age: 45
End: 2025-02-06
Payer: COMMERCIAL

## 2025-02-06 VITALS
WEIGHT: 213.4 LBS | RESPIRATION RATE: 18 BRPM | DIASTOLIC BLOOD PRESSURE: 72 MMHG | OXYGEN SATURATION: 96 % | HEART RATE: 89 BPM | BODY MASS INDEX: 34.44 KG/M2 | SYSTOLIC BLOOD PRESSURE: 102 MMHG | TEMPERATURE: 97.9 F

## 2025-02-06 DIAGNOSIS — D64.81 ANEMIA ASSOCIATED WITH CHEMOTHERAPY: ICD-10-CM

## 2025-02-06 DIAGNOSIS — C77.1 METASTASIS TO MEDIASTINAL LYMPH NODE (HCC): Primary | ICD-10-CM

## 2025-02-06 DIAGNOSIS — C18.7 CANCER OF SIGMOID (HCC): Primary | ICD-10-CM

## 2025-02-06 DIAGNOSIS — T45.1X5D ADVERSE EFFECT OF CHEMOTHERAPY, SUBSEQUENT ENCOUNTER: ICD-10-CM

## 2025-02-06 DIAGNOSIS — R59.0 LYMPHADENOPATHY, RETROPERITONEAL: ICD-10-CM

## 2025-02-06 DIAGNOSIS — C18.7 CANCER OF SIGMOID (HCC): ICD-10-CM

## 2025-02-06 DIAGNOSIS — T45.1X5A ANEMIA ASSOCIATED WITH CHEMOTHERAPY: ICD-10-CM

## 2025-02-06 DIAGNOSIS — D64.9 NORMOCYTIC ANEMIA: ICD-10-CM

## 2025-02-06 PROCEDURE — 99215 OFFICE O/P EST HI 40 MIN: CPT | Performed by: INTERNAL MEDICINE

## 2025-02-06 PROCEDURE — 99211 OFF/OP EST MAY X REQ PHY/QHP: CPT | Performed by: INTERNAL MEDICINE

## 2025-02-06 PROCEDURE — 6360000002 HC RX W HCPCS: Performed by: INTERNAL MEDICINE

## 2025-02-06 PROCEDURE — 2500000003 HC RX 250 WO HCPCS: Performed by: INTERNAL MEDICINE

## 2025-02-06 RX ORDER — HEPARIN 100 UNIT/ML
500 SYRINGE INTRAVENOUS PRN
Status: DISCONTINUED | OUTPATIENT
Start: 2025-02-06 | End: 2025-02-07 | Stop reason: HOSPADM

## 2025-02-06 RX ORDER — SODIUM CHLORIDE 0.9 % (FLUSH) 0.9 %
5-40 SYRINGE (ML) INJECTION PRN
Status: DISCONTINUED | OUTPATIENT
Start: 2025-02-06 | End: 2025-02-07 | Stop reason: HOSPADM

## 2025-02-06 RX ADMIN — HEPARIN 500 UNITS: 100 SYRINGE at 11:01

## 2025-02-06 RX ADMIN — SODIUM CHLORIDE, PRESERVATIVE FREE 10 ML: 5 INJECTION INTRAVENOUS at 11:00

## 2025-02-06 NOTE — PROGRESS NOTES
Patient arrives ambulatory per self for MD visit & pump removal  Patient states he is fatigued & having trouble sleeping at night. Nausea controlled with Zofran at home;able to eat & drink  MD note reviewed  Denies issues with pump;pump still has 2.9 ml to be infused;okay per Dr Sanchez to bolus remaining amount, bolus complete without incident  Pump disconnected from port  Port flushed with brisk blood return noted;then heparinized with intact sprague needle removed per protocol.  Pt discharged ambulatory per self  AVS per

## 2025-02-06 NOTE — TELEPHONE ENCOUNTER
MARCELO HERE FOR FOLLOW UP & PUMP OFF   Dc folfox  Start panitumamb and FOLFIRI  Rtc in 3 weeks  NEW ORDER PENDING PRECERT   MD VISIT: 2/27/25 @ 10:30AM   AVS PRINTED AND GIVEN ON EXIT

## 2025-02-06 NOTE — PROGRESS NOTES
Patient ID: Marcelo Barrios, 1980, [id number], 44 y.o.  Referred by :  No ref. provider found   Reason for consultation: Stage IV sigmoid cancer      Oncology problem  Stage IV sigmoid cancer  No MSI high, Tempus  K-rene wild-type  Panitumumab induced skin toxicity        Oncology history  Systemic treatment form of FOLFOX Avastin started 10/29/2024 given every 2 weeks and changed to FOLFOX panitumumab(not able to tolerate)  January 31, 2025> CT abdomen pelvis did show evidence of progression(mediastinal and retroperitoneal lymph node)  Tentatively restart panitumumab and FOLFIRI          HISTORY OF PRESENT ILLNESS:    Oncologic History:    Marcelo Barrios is a very pleasant 44 y.o. male.presented initially to [facility] on 8/7/2024 with chief complaints of of abdominal pain.  CT abdomen done at that time showed segmental circumferential wall thickening with the rib enhancing fluid collection measuring 5.8 cm x 2.2 x 3.2 cm concerning for colitis/proctitis.  Also had enlarged retroperitoneal lymph nodes measuring 2.2 x 1.9 cm.  GI evaluated the patient, patient underwent colonoscopy, he was found to have circumferential mucosal abnormality and the scope could not be passed above 20 cm from the anus.  Biopsies were obtained.  And he was discharged home on Levaquin and Flagyl.  His biopsy results came back positive for colonic adenocarcinoma with signet ring features.  Underwent flexible sigmoidoscopy, exploratory laparotomy, creation of end colostomy and resection of multiple peritoneal masses for unresectable colon cancer from the rectum to the proximal sigmoid colon on September 20, 2024  Baseline CEA 58.4;      URGICAL PATHOLOGY CONSULTATION      Patient Name: MARCELO BARRIOS   [facility] Rec: [id number]   Path Number: [id number]   Collected: 8/9/2024   Received: 8/9/2024   Reported: 8/15/2024 09:16     -- Diagnosis --   COLON, SITE NOT SPECIFIED, BIOPSY:   -COLONIC ADENOCARCINOMA WITH SIGNET RING

## 2025-02-10 ENCOUNTER — TELEPHONE (OUTPATIENT)
Dept: PRIMARY CARE CLINIC | Age: 45
End: 2025-02-10

## 2025-02-10 DIAGNOSIS — C18.7 CANCER OF SIGMOID (HCC): Primary | ICD-10-CM

## 2025-02-10 NOTE — TELEPHONE ENCOUNTER
Pt needs a new referral to [facility]

## 2025-02-11 ENCOUNTER — TELEPHONE (OUTPATIENT)
Dept: INFUSION THERAPY | Facility: MEDICAL CENTER | Age: 45
End: 2025-02-11

## 2025-02-11 NOTE — TELEPHONE ENCOUNTER
New Chemotherapy Order - Dr. Sanchez 2/6/25         Pathology -  metastatic adenocarcinoma 9/20/24              Ht = 167.6 cm     Wt = 96.8 kg     BSA =  2.12           26 cycles , Q14 days        Vectibix  6 mg/kg  = 580.8 mg  rounded - 580 mg , IV      Irinotecan  - 180 mg /m2 = 381.6 mg  rounded - 380 mg , IV        Leucovorin - 400 mg/m2 (BSA 2) = 848 mg rounded - 850 mg , IV        5FU push  - 400 mg/m2 = 848 mg rounded - 8850 mg , IVP       5FU pump  - 2400 mg/m2  (BSA 2.08) = 4992 mg rounded 5000 mg cadd pump             Chart to  for scheduling , note to pool for 2nd RN check

## 2025-02-13 DIAGNOSIS — C18.7 CANCER OF SIGMOID (HCC): Primary | ICD-10-CM

## 2025-02-13 DIAGNOSIS — C77.1 METASTASIS TO MEDIASTINAL LYMPH NODE (HCC): ICD-10-CM

## 2025-02-14 ENCOUNTER — TELEPHONE (OUTPATIENT)
Dept: ONCOLOGY | Age: 45
End: 2025-02-14

## 2025-02-14 NOTE — TELEPHONE ENCOUNTER
Name: Yuri Phillips  : 1980  MRN: [id number]    Oncology Navigation Follow-Up Note    Contact Type:  Telephone    Notes: Writer called pt to check on him and to see how he is feeling. No answer, VM left requesting a return call.      Electronically signed by Jovanna Carter RN on 2025 at 3:29 PM

## 2025-02-14 NOTE — TELEPHONE ENCOUNTER
[facility] Oncology Nutrition Note:   Writer received message/referral from triage nurse, Isabell, stating patient is requesting to speak with a dietitian. Patient known to writer. Writer called patient at number listed in chart. No answer. Detailed message left encouraging patient to call back as able.     Maryan Silver RD, LD, CNSC  Registered Dietitian  [facility]  [phone]

## 2025-02-15 ENCOUNTER — HOSPITAL ENCOUNTER (OUTPATIENT)
Facility: MEDICAL CENTER | Age: 45
End: 2025-02-15
Payer: COMMERCIAL

## 2025-02-18 ENCOUNTER — HOSPITAL ENCOUNTER (OUTPATIENT)
Dept: INFUSION THERAPY | Facility: MEDICAL CENTER | Age: 45
Discharge: HOME | End: 2025-02-18
Payer: COMMERCIAL

## 2025-02-18 VITALS
BODY MASS INDEX: 35.7 KG/M2 | OXYGEN SATURATION: 98 % | DIASTOLIC BLOOD PRESSURE: 69 MMHG | HEART RATE: 64 BPM | RESPIRATION RATE: 16 BRPM | SYSTOLIC BLOOD PRESSURE: 140 MMHG | WEIGHT: 221.2 LBS | TEMPERATURE: 98.4 F

## 2025-02-18 DIAGNOSIS — C18.7 CANCER OF SIGMOID (HCC): Primary | ICD-10-CM

## 2025-02-18 DIAGNOSIS — C23 GALLBLADDER CANCER (HCC): ICD-10-CM

## 2025-02-18 LAB
ALBUMIN SERPL-MCNC: 3.7 G/DL (ref 3.5–5.2)
ALBUMIN/GLOB SERPL: 1.2 {RATIO} (ref 1–2.5)
ALP SERPL-CCNC: 79 U/L (ref 40–129)
ALT SERPL-CCNC: 24 U/L (ref 10–50)
ANION GAP SERPL CALCULATED.3IONS-SCNC: 9 MMOL/L (ref 9–16)
AST SERPL-CCNC: 33 U/L (ref 10–50)
BASOPHILS # BLD: <0.03 K/UL (ref 0–0.2)
BASOPHILS NFR BLD: 0 % (ref 0–2)
BILIRUB SERPL-MCNC: 0.2 MG/DL (ref 0–1.2)
BUN SERPL-MCNC: 11 MG/DL (ref 6–20)
CALCIUM SERPL-MCNC: 9.4 MG/DL (ref 8.6–10.4)
CHLORIDE SERPL-SCNC: 105 MMOL/L (ref 98–107)
CO2 SERPL-SCNC: 26 MMOL/L (ref 20–31)
CREAT SERPL-MCNC: 1 MG/DL (ref 0.7–1.2)
EOSINOPHIL # BLD: 0.37 K/UL (ref 0–0.44)
EOSINOPHILS RELATIVE PERCENT: 8 % (ref 1–4)
ERYTHROCYTE [DISTWIDTH] IN BLOOD BY AUTOMATED COUNT: 19.6 % (ref 11.8–14.4)
GFR, ESTIMATED: >90 ML/MIN/1.73M2
GLUCOSE SERPL-MCNC: 107 MG/DL (ref 74–99)
HCT VFR BLD AUTO: 32.8 % (ref 40.7–50.3)
HGB BLD-MCNC: 10.8 G/DL (ref 13–17)
IMM GRANULOCYTES # BLD AUTO: 0.01 K/UL (ref 0–0.3)
IMM GRANULOCYTES NFR BLD: 0 %
LYMPHOCYTES NFR BLD: 1.3 K/UL (ref 1.1–3.7)
LYMPHOCYTES RELATIVE PERCENT: 29 % (ref 24–43)
MAGNESIUM SERPL-MCNC: 1.9 MG/DL (ref 1.6–2.6)
MCH RBC QN AUTO: 27.9 PG (ref 25.2–33.5)
MCHC RBC AUTO-ENTMCNC: 32.9 G/DL (ref 28.4–34.8)
MCV RBC AUTO: 84.8 FL (ref 82.6–102.9)
MONOCYTES NFR BLD: 0.91 K/UL (ref 0.1–1.2)
MONOCYTES NFR BLD: 20 % (ref 3–12)
NEUTROPHILS NFR BLD: 43 % (ref 36–65)
NEUTS SEG NFR BLD: 1.89 K/UL (ref 1.5–8.1)
NRBC BLD-RTO: 0 PER 100 WBC
PLATELET # BLD AUTO: 179 K/UL (ref 138–453)
PMV BLD AUTO: 8.7 FL (ref 8.1–13.5)
POTASSIUM SERPL-SCNC: 3.9 MMOL/L (ref 3.7–5.3)
PROT SERPL-MCNC: 6.8 G/DL (ref 6.6–8.7)
RBC # BLD AUTO: 3.87 M/UL (ref 4.21–5.77)
RBC # BLD: ABNORMAL 10*6/UL
SODIUM SERPL-SCNC: 140 MMOL/L (ref 136–145)
WBC OTHER # BLD: 4.5 K/UL (ref 3.5–11.3)

## 2025-02-18 PROCEDURE — 2580000003 HC RX 258: Performed by: INTERNAL MEDICINE

## 2025-02-18 PROCEDURE — 83735 ASSAY OF MAGNESIUM: CPT

## 2025-02-18 PROCEDURE — 85025 COMPLETE CBC W/AUTO DIFF WBC: CPT

## 2025-02-18 PROCEDURE — 80053 COMPREHEN METABOLIC PANEL: CPT

## 2025-02-18 PROCEDURE — 6360000002 HC RX W HCPCS: Performed by: INTERNAL MEDICINE

## 2025-02-18 PROCEDURE — 96415 CHEMO IV INFUSION ADDL HR: CPT

## 2025-02-18 PROCEDURE — 96411 CHEMO IV PUSH ADDL DRUG: CPT

## 2025-02-18 PROCEDURE — 96368 THER/DIAG CONCURRENT INF: CPT

## 2025-02-18 PROCEDURE — 2500000003 HC RX 250 WO HCPCS: Performed by: INTERNAL MEDICINE

## 2025-02-18 PROCEDURE — 96417 CHEMO IV INFUS EACH ADDL SEQ: CPT

## 2025-02-18 PROCEDURE — 96413 CHEMO IV INFUSION 1 HR: CPT

## 2025-02-18 PROCEDURE — 96416 CHEMO PROLONG INFUSE W/PUMP: CPT

## 2025-02-18 PROCEDURE — 96375 TX/PRO/DX INJ NEW DRUG ADDON: CPT

## 2025-02-18 RX ORDER — EPINEPHRINE 1 MG/ML
0.3 INJECTION, SOLUTION, CONCENTRATE INTRAVENOUS PRN
OUTPATIENT
Start: 2025-02-18

## 2025-02-18 RX ORDER — HYDROCORTISONE SODIUM SUCCINATE 100 MG/2ML
100 INJECTION INTRAMUSCULAR; INTRAVENOUS
OUTPATIENT
Start: 2025-02-18

## 2025-02-18 RX ORDER — PALONOSETRON 0.05 MG/ML
0.25 INJECTION, SOLUTION INTRAVENOUS ONCE
Status: COMPLETED | OUTPATIENT
Start: 2025-02-18 | End: 2025-02-18

## 2025-02-18 RX ORDER — CYCLOBENZAPRINE HCL 5 MG
5 TABLET ORAL 3 TIMES DAILY PRN
Qty: 90 TABLET | Refills: 0 | Status: SHIPPED | OUTPATIENT
Start: 2025-02-18 | End: 2025-03-20

## 2025-02-18 RX ORDER — MAGNESIUM SULFATE IN WATER 40 MG/ML
2000 INJECTION, SOLUTION INTRAVENOUS PRN
Status: DISCONTINUED | OUTPATIENT
Start: 2025-02-18 | End: 2025-02-19 | Stop reason: HOSPADM

## 2025-02-18 RX ORDER — SODIUM CHLORIDE 9 MG/ML
5-250 INJECTION, SOLUTION INTRAVENOUS PRN
Status: CANCELLED | OUTPATIENT
Start: 2025-02-18

## 2025-02-18 RX ORDER — SODIUM CHLORIDE 9 MG/ML
5-250 INJECTION, SOLUTION INTRAVENOUS PRN
OUTPATIENT
Start: 2025-02-18

## 2025-02-18 RX ORDER — HEPARIN SODIUM (PORCINE) LOCK FLUSH IV SOLN 100 UNIT/ML 100 UNIT/ML
500 SOLUTION INTRAVENOUS PRN
OUTPATIENT
Start: 2025-02-18

## 2025-02-18 RX ORDER — MEPERIDINE HYDROCHLORIDE 50 MG/ML
12.5 INJECTION INTRAMUSCULAR; INTRAVENOUS; SUBCUTANEOUS PRN
OUTPATIENT
Start: 2025-02-18

## 2025-02-18 RX ORDER — DIPHENHYDRAMINE HYDROCHLORIDE 50 MG/ML
50 INJECTION INTRAMUSCULAR; INTRAVENOUS
OUTPATIENT
Start: 2025-02-18

## 2025-02-18 RX ORDER — FLUOROURACIL 50 MG/ML
400 INJECTION, SOLUTION INTRAVENOUS ONCE
Status: COMPLETED | OUTPATIENT
Start: 2025-02-18 | End: 2025-02-18

## 2025-02-18 RX ORDER — BENZOCAINE/MENTHOL 6 MG-10 MG
LOZENGE MUCOUS MEMBRANE
Qty: 30 G | Refills: 1 | Status: SHIPPED | OUTPATIENT
Start: 2025-02-18 | End: 2025-02-25

## 2025-02-18 RX ORDER — ONDANSETRON 2 MG/ML
8 INJECTION INTRAMUSCULAR; INTRAVENOUS
OUTPATIENT
Start: 2025-02-18

## 2025-02-18 RX ORDER — SODIUM CHLORIDE 9 MG/ML
5-250 INJECTION, SOLUTION INTRAVENOUS PRN
OUTPATIENT
Start: 2025-02-20

## 2025-02-18 RX ORDER — DEXAMETHASONE SODIUM PHOSPHATE 10 MG/ML
10 INJECTION, SOLUTION INTRA-ARTICULAR; INTRALESIONAL; INTRAMUSCULAR; INTRAVENOUS; SOFT TISSUE ONCE
Status: COMPLETED | OUTPATIENT
Start: 2025-02-18 | End: 2025-02-18

## 2025-02-18 RX ORDER — ATROPINE SULFATE 0.4 MG/ML
0.4 INJECTION, SOLUTION INTRAVENOUS ONCE
Status: COMPLETED | OUTPATIENT
Start: 2025-02-18 | End: 2025-02-18

## 2025-02-18 RX ORDER — DOXYCYCLINE HYCLATE 100 MG
100 TABLET ORAL 2 TIMES DAILY
Qty: 14 TABLET | Refills: 0 | Status: SHIPPED | OUTPATIENT
Start: 2025-02-18 | End: 2025-02-25

## 2025-02-18 RX ORDER — SODIUM CHLORIDE 0.9 % (FLUSH) 0.9 %
5-40 SYRINGE (ML) INJECTION PRN
OUTPATIENT
Start: 2025-02-20

## 2025-02-18 RX ORDER — FLUOROURACIL 50 MG/ML
400 INJECTION, SOLUTION INTRAVENOUS ONCE
Status: CANCELLED | OUTPATIENT
Start: 2025-02-18 | End: 2025-02-18

## 2025-02-18 RX ORDER — ACETAMINOPHEN 325 MG/1
650 TABLET ORAL
OUTPATIENT
Start: 2025-02-18

## 2025-02-18 RX ORDER — FAMOTIDINE 10 MG/ML
20 INJECTION, SOLUTION INTRAVENOUS
OUTPATIENT
Start: 2025-02-18

## 2025-02-18 RX ORDER — SODIUM CHLORIDE 0.9 % (FLUSH) 0.9 %
5-40 SYRINGE (ML) INJECTION PRN
Status: DISCONTINUED | OUTPATIENT
Start: 2025-02-18 | End: 2025-02-19 | Stop reason: HOSPADM

## 2025-02-18 RX ORDER — PALONOSETRON 0.05 MG/ML
0.25 INJECTION, SOLUTION INTRAVENOUS ONCE
Status: CANCELLED | OUTPATIENT
Start: 2025-02-18 | End: 2025-02-18

## 2025-02-18 RX ORDER — ALBUTEROL SULFATE 90 UG/1
4 INHALANT RESPIRATORY (INHALATION) PRN
OUTPATIENT
Start: 2025-02-18

## 2025-02-18 RX ORDER — SODIUM CHLORIDE 9 MG/ML
5-250 INJECTION, SOLUTION INTRAVENOUS PRN
Status: DISCONTINUED | OUTPATIENT
Start: 2025-02-18 | End: 2025-02-19 | Stop reason: HOSPADM

## 2025-02-18 RX ORDER — HEPARIN SODIUM (PORCINE) LOCK FLUSH IV SOLN 100 UNIT/ML 100 UNIT/ML
500 SOLUTION INTRAVENOUS PRN
OUTPATIENT
Start: 2025-02-20

## 2025-02-18 RX ORDER — SODIUM CHLORIDE 9 MG/ML
INJECTION, SOLUTION INTRAVENOUS CONTINUOUS
OUTPATIENT
Start: 2025-02-18

## 2025-02-18 RX ORDER — SODIUM CHLORIDE 0.9 % (FLUSH) 0.9 %
5-40 SYRINGE (ML) INJECTION PRN
Status: CANCELLED | OUTPATIENT
Start: 2025-02-18

## 2025-02-18 RX ADMIN — PANITUMUMAB 600 MG: 400 SOLUTION INTRAVENOUS at 11:34

## 2025-02-18 RX ADMIN — FLUOROURACIL 5000 MG: 50 INJECTION, SOLUTION INTRAVENOUS at 15:17

## 2025-02-18 RX ADMIN — FLUOROURACIL 850 MG: 50 INJECTION, SOLUTION INTRAVENOUS at 15:17

## 2025-02-18 RX ADMIN — SODIUM CHLORIDE 20 ML/HR: 0.9 INJECTION, SOLUTION INTRAVENOUS at 10:01

## 2025-02-18 RX ADMIN — IRINOTECAN HYDROCHLORIDE 380 MG: 20 INJECTION, SOLUTION INTRAVENOUS at 13:19

## 2025-02-18 RX ADMIN — SODIUM CHLORIDE, PRESERVATIVE FREE 10 ML: 5 INJECTION INTRAVENOUS at 09:52

## 2025-02-18 RX ADMIN — PALONOSETRON 0.25 MG: 0.05 INJECTION, SOLUTION INTRAVENOUS at 11:29

## 2025-02-18 RX ADMIN — ATROPINE SULFATE 0.4 MG: 0.4 INJECTION, SOLUTION INTRAVENOUS at 12:42

## 2025-02-18 RX ADMIN — DEXAMETHASONE SODIUM PHOSPHATE 10 MG: 10 INJECTION INTRAMUSCULAR; INTRAVENOUS at 11:29

## 2025-02-18 RX ADMIN — LEUCOVORIN CALCIUM 800 MG: 100 INJECTION, POWDER, LYOPHILIZED, FOR SUSPENSION INTRAMUSCULAR; INTRAVENOUS at 13:18

## 2025-02-18 ASSESSMENT — PAIN SCALES - GENERAL: PAINLEVEL_OUTOF10: 2

## 2025-02-18 ASSESSMENT — PAIN DESCRIPTION - LOCATION: LOCATION: HIP

## 2025-02-18 NOTE — PROGRESS NOTES
Patient arrived ambulatory per self for C1D1 treatment.   Patient reports muscle spasms to legs, denies other complaint or concern.  Port accessed, specimen sent.   Labs reviewed, OK to treat.   Patient premedicated, line flushed.  Consent signed, to  for signature.  Vectibix infused without issue, line flushed.  Atropine given, line flushed.   Irinotecan and leucovorin infused concurrently without issue, line flushed.  5FU push given with brisk blood return present throughout.  Patient connected to CADD pump, verified working prior to patient discharged and verified by second RN.  Patient discharged.   Scripts for doxycycline, hydrocortisone and flexeril sent to pharmacy per perfectserve with Dr. Sanchez.

## 2025-02-20 ENCOUNTER — HOSPITAL ENCOUNTER (OUTPATIENT)
Dept: INFUSION THERAPY | Facility: MEDICAL CENTER | Age: 45
Discharge: HOME | End: 2025-02-20
Payer: COMMERCIAL

## 2025-02-20 DIAGNOSIS — C18.7 CANCER OF SIGMOID (HCC): Primary | ICD-10-CM

## 2025-02-20 PROCEDURE — 6360000002 HC RX W HCPCS: Performed by: INTERNAL MEDICINE

## 2025-02-20 PROCEDURE — 2500000003 HC RX 250 WO HCPCS: Performed by: INTERNAL MEDICINE

## 2025-02-20 RX ORDER — SODIUM CHLORIDE 0.9 % (FLUSH) 0.9 %
5-40 SYRINGE (ML) INJECTION PRN
OUTPATIENT
Start: 2025-02-20

## 2025-02-20 RX ORDER — SODIUM CHLORIDE 9 MG/ML
25 INJECTION, SOLUTION INTRAVENOUS PRN
OUTPATIENT
Start: 2025-02-20

## 2025-02-20 RX ORDER — SODIUM CHLORIDE 0.9 % (FLUSH) 0.9 %
5-40 SYRINGE (ML) INJECTION PRN
Status: DISCONTINUED | OUTPATIENT
Start: 2025-02-20 | End: 2025-02-21 | Stop reason: HOSPADM

## 2025-02-20 RX ORDER — HEPARIN 100 UNIT/ML
500 SYRINGE INTRAVENOUS PRN
Status: DISCONTINUED | OUTPATIENT
Start: 2025-02-20 | End: 2025-02-21 | Stop reason: HOSPADM

## 2025-02-20 RX ORDER — ONDANSETRON 2 MG/ML
8 INJECTION INTRAMUSCULAR; INTRAVENOUS
OUTPATIENT
Start: 2025-02-20

## 2025-02-20 RX ORDER — HEPARIN 100 UNIT/ML
500 SYRINGE INTRAVENOUS PRN
OUTPATIENT
Start: 2025-02-20

## 2025-02-20 RX ADMIN — HEPARIN 500 UNITS: 100 SYRINGE at 13:21

## 2025-02-20 RX ADMIN — SODIUM CHLORIDE, PRESERVATIVE FREE 10 ML: 5 INJECTION INTRAVENOUS at 13:21

## 2025-02-20 NOTE — PROGRESS NOTES
Patient arrived ambulatory to unit for 5FU pump removal.  Denies complaints or concerns.  Chemo cartridge empty upon arrival to unit.  Pump disconnected.   Port flushed and heparinized with intact sprague needle removed per protocol.  Patient ambulated off unit per self at discharge.

## 2025-02-21 NOTE — TELEPHONE ENCOUNTER
Irinotecan added to plan, new drug for pt, checked with [facility] pharmacist and agree with calculations irinotecan 382 mg IV added to plan , second rn check done.

## 2025-02-27 ENCOUNTER — OFFICE VISIT (OUTPATIENT)
Dept: ONCOLOGY | Age: 45
End: 2025-02-27
Payer: COMMERCIAL

## 2025-02-27 ENCOUNTER — TELEPHONE (OUTPATIENT)
Dept: ONCOLOGY | Age: 45
End: 2025-02-27

## 2025-02-27 VITALS
TEMPERATURE: 98.5 F | DIASTOLIC BLOOD PRESSURE: 84 MMHG | WEIGHT: 214 LBS | SYSTOLIC BLOOD PRESSURE: 137 MMHG | HEIGHT: 67 IN | HEART RATE: 83 BPM | RESPIRATION RATE: 16 BRPM | BODY MASS INDEX: 33.59 KG/M2

## 2025-02-27 DIAGNOSIS — C18.7 CANCER OF SIGMOID (HCC): ICD-10-CM

## 2025-02-27 DIAGNOSIS — D64.81 ANEMIA ASSOCIATED WITH CHEMOTHERAPY: ICD-10-CM

## 2025-02-27 DIAGNOSIS — D64.9 NORMOCYTIC ANEMIA: ICD-10-CM

## 2025-02-27 DIAGNOSIS — C77.1 METASTASIS TO MEDIASTINAL LYMPH NODE (HCC): Primary | ICD-10-CM

## 2025-02-27 DIAGNOSIS — T45.1X5D ADVERSE EFFECT OF CHEMOTHERAPY, SUBSEQUENT ENCOUNTER: ICD-10-CM

## 2025-02-27 DIAGNOSIS — L27.0 DRUG-INDUCED SKIN RASH: ICD-10-CM

## 2025-02-27 DIAGNOSIS — T45.1X5A ANEMIA ASSOCIATED WITH CHEMOTHERAPY: ICD-10-CM

## 2025-02-27 PROCEDURE — 99215 OFFICE O/P EST HI 40 MIN: CPT | Performed by: INTERNAL MEDICINE

## 2025-02-27 PROCEDURE — 99211 OFF/OP EST MAY X REQ PHY/QHP: CPT | Performed by: INTERNAL MEDICINE

## 2025-02-27 RX ORDER — ACETAMINOPHEN 325 MG/1
650 TABLET ORAL
OUTPATIENT
Start: 2025-03-04

## 2025-02-27 RX ORDER — ALBUTEROL SULFATE 90 UG/1
4 INHALANT RESPIRATORY (INHALATION) PRN
OUTPATIENT
Start: 2025-03-04

## 2025-02-27 RX ORDER — HYDROCORTISONE SODIUM SUCCINATE 100 MG/2ML
100 INJECTION INTRAMUSCULAR; INTRAVENOUS
OUTPATIENT
Start: 2025-03-04

## 2025-02-27 RX ORDER — ONDANSETRON 2 MG/ML
8 INJECTION INTRAMUSCULAR; INTRAVENOUS
OUTPATIENT
Start: 2025-03-04

## 2025-02-27 RX ORDER — SODIUM CHLORIDE 9 MG/ML
5-250 INJECTION, SOLUTION INTRAVENOUS PRN
OUTPATIENT
Start: 2025-03-06

## 2025-02-27 RX ORDER — HEPARIN SODIUM (PORCINE) LOCK FLUSH IV SOLN 100 UNIT/ML 100 UNIT/ML
500 SOLUTION INTRAVENOUS PRN
OUTPATIENT
Start: 2025-03-06

## 2025-02-27 RX ORDER — FLUOROURACIL 50 MG/ML
400 INJECTION, SOLUTION INTRAVENOUS ONCE
OUTPATIENT
Start: 2025-03-04 | End: 2025-03-04

## 2025-02-27 RX ORDER — PALONOSETRON 0.05 MG/ML
0.25 INJECTION, SOLUTION INTRAVENOUS ONCE
OUTPATIENT
Start: 2025-03-04 | End: 2025-03-04

## 2025-02-27 RX ORDER — MEPERIDINE HYDROCHLORIDE 50 MG/ML
12.5 INJECTION INTRAMUSCULAR; INTRAVENOUS; SUBCUTANEOUS PRN
OUTPATIENT
Start: 2025-03-04

## 2025-02-27 RX ORDER — FAMOTIDINE 10 MG/ML
20 INJECTION, SOLUTION INTRAVENOUS
OUTPATIENT
Start: 2025-03-04

## 2025-02-27 RX ORDER — SODIUM CHLORIDE 9 MG/ML
5-250 INJECTION, SOLUTION INTRAVENOUS PRN
OUTPATIENT
Start: 2025-03-04

## 2025-02-27 RX ORDER — SODIUM CHLORIDE 0.9 % (FLUSH) 0.9 %
5-40 SYRINGE (ML) INJECTION PRN
OUTPATIENT
Start: 2025-03-06

## 2025-02-27 RX ORDER — SODIUM CHLORIDE 0.9 % (FLUSH) 0.9 %
5-40 SYRINGE (ML) INJECTION PRN
OUTPATIENT
Start: 2025-03-04

## 2025-02-27 RX ORDER — DIPHENHYDRAMINE HYDROCHLORIDE 50 MG/ML
50 INJECTION INTRAMUSCULAR; INTRAVENOUS
OUTPATIENT
Start: 2025-03-04

## 2025-02-27 RX ORDER — HEPARIN SODIUM (PORCINE) LOCK FLUSH IV SOLN 100 UNIT/ML 100 UNIT/ML
500 SOLUTION INTRAVENOUS PRN
OUTPATIENT
Start: 2025-03-04

## 2025-02-27 RX ORDER — SODIUM CHLORIDE 9 MG/ML
INJECTION, SOLUTION INTRAVENOUS CONTINUOUS
OUTPATIENT
Start: 2025-03-04

## 2025-02-27 RX ORDER — EPINEPHRINE 1 MG/ML
0.3 INJECTION, SOLUTION, CONCENTRATE INTRAVENOUS PRN
OUTPATIENT
Start: 2025-03-04

## 2025-02-27 NOTE — TELEPHONE ENCOUNTER
MARCELO HERE FOR MD VISIT  Rtc in 2-3 weeks  Ok for next treament  MD VISIT 3/13/25 @ 9AM  AVS PRINTED W/ INSTRUCTIONS AND MAILED TO PT

## 2025-02-27 NOTE — PROGRESS NOTES
Patient ID: Marcelo Barrios, 1980, [id number], 44 y.o.  Referred by :  No ref. provider found   Reason for consultation: Stage IV sigmoid cancer      Oncology problem  Stage IV sigmoid cancer  No MSI high, Tempus  K-rene wild-type  Panitumumab induced skin toxicity        Oncology history  Systemic treatment form of FOLFOX Avastin started 10/29/2024 given every 2 weeks and changed to FOLFOX panitumumab(not able to tolerate)  January 31, 2025> CT abdomen pelvis did show evidence of progression(mediastinal and retroperitoneal lymph node)  February 18, 2025 started >panitumumab and FOLFIRI          HISTORY OF PRESENT ILLNESS:    Oncologic History:    Marcelo Barrios is a very pleasant 44 y.o. male.presented initially to [facility] on 8/7/2024 with chief complaints of of abdominal pain.  CT abdomen done at that time showed segmental circumferential wall thickening with the rib enhancing fluid collection measuring 5.8 cm x 2.2 x 3.2 cm concerning for colitis/proctitis.  Also had enlarged retroperitoneal lymph nodes measuring 2.2 x 1.9 cm.  GI evaluated the patient, patient underwent colonoscopy, he was found to have circumferential mucosal abnormality and the scope could not be passed above 20 cm from the anus.  Biopsies were obtained.  And he was discharged home on Levaquin and Flagyl.  His biopsy results came back positive for colonic adenocarcinoma with signet ring features.  Underwent flexible sigmoidoscopy, exploratory laparotomy, creation of end colostomy and resection of multiple peritoneal masses for unresectable colon cancer from the rectum to the proximal sigmoid colon on September 20, 2024  Baseline CEA 58.4;      URGICAL PATHOLOGY CONSULTATION      Patient Name: MARCELO BARRIOS   [facility] Rec: [id number]   Path Number: [id number]   Collected: 8/9/2024   Received: 8/9/2024   Reported: 8/15/2024 09:16     -- Diagnosis --   COLON, SITE NOT SPECIFIED, BIOPSY:   -COLONIC ADENOCARCINOMA WITH SIGNET RING

## 2025-03-03 ENCOUNTER — HOSPITAL ENCOUNTER (OUTPATIENT)
Facility: MEDICAL CENTER | Age: 45
End: 2025-03-03
Payer: COMMERCIAL

## 2025-03-04 ENCOUNTER — HOSPITAL ENCOUNTER (OUTPATIENT)
Dept: INFUSION THERAPY | Facility: MEDICAL CENTER | Age: 45
Discharge: HOME | End: 2025-03-04
Payer: COMMERCIAL

## 2025-03-04 VITALS
WEIGHT: 215.5 LBS | SYSTOLIC BLOOD PRESSURE: 116 MMHG | RESPIRATION RATE: 18 BRPM | BODY MASS INDEX: 33.75 KG/M2 | DIASTOLIC BLOOD PRESSURE: 73 MMHG | TEMPERATURE: 98.4 F | HEART RATE: 72 BPM

## 2025-03-04 DIAGNOSIS — C18.7 CANCER OF SIGMOID (HCC): Primary | ICD-10-CM

## 2025-03-04 DIAGNOSIS — C23 GALLBLADDER CANCER (HCC): ICD-10-CM

## 2025-03-04 LAB
ALBUMIN SERPL-MCNC: 3.8 G/DL (ref 3.5–5.2)
ALBUMIN/GLOB SERPL: 1 {RATIO} (ref 1–2.5)
ALP SERPL-CCNC: 98 U/L (ref 40–129)
ALT SERPL-CCNC: 13 U/L (ref 10–50)
ANION GAP SERPL CALCULATED.3IONS-SCNC: 11 MMOL/L (ref 9–16)
AST SERPL-CCNC: 23 U/L (ref 10–50)
BASOPHILS # BLD: <0.03 K/UL (ref 0–0.2)
BASOPHILS NFR BLD: 0 % (ref 0–2)
BILIRUB SERPL-MCNC: 0.3 MG/DL (ref 0–1.2)
BUN SERPL-MCNC: 9 MG/DL (ref 6–20)
CALCIUM SERPL-MCNC: 9.1 MG/DL (ref 8.6–10.4)
CHLORIDE SERPL-SCNC: 103 MMOL/L (ref 98–107)
CO2 SERPL-SCNC: 25 MMOL/L (ref 20–31)
CREAT SERPL-MCNC: 0.9 MG/DL (ref 0.7–1.2)
EOSINOPHIL # BLD: 0.17 K/UL (ref 0–0.44)
EOSINOPHILS RELATIVE PERCENT: 3 % (ref 1–4)
ERYTHROCYTE [DISTWIDTH] IN BLOOD BY AUTOMATED COUNT: 19.2 % (ref 11.8–14.4)
GFR, ESTIMATED: >90 ML/MIN/1.73M2
GLUCOSE SERPL-MCNC: 97 MG/DL (ref 74–99)
HCT VFR BLD AUTO: 35.2 % (ref 40.7–50.3)
HGB BLD-MCNC: 11.9 G/DL (ref 13–17)
IMM GRANULOCYTES # BLD AUTO: 0.01 K/UL (ref 0–0.3)
IMM GRANULOCYTES NFR BLD: 0 %
LYMPHOCYTES NFR BLD: 1.34 K/UL (ref 1.1–3.7)
LYMPHOCYTES RELATIVE PERCENT: 24 % (ref 24–43)
MAGNESIUM SERPL-MCNC: 1.9 MG/DL (ref 1.6–2.6)
MCH RBC QN AUTO: 28.8 PG (ref 25.2–33.5)
MCHC RBC AUTO-ENTMCNC: 33.8 G/DL (ref 28.4–34.8)
MCV RBC AUTO: 85.2 FL (ref 82.6–102.9)
MONOCYTES NFR BLD: 0.81 K/UL (ref 0.1–1.2)
MONOCYTES NFR BLD: 15 % (ref 3–12)
NEUTROPHILS NFR BLD: 58 % (ref 36–65)
NEUTS SEG NFR BLD: 3.2 K/UL (ref 1.5–8.1)
NRBC BLD-RTO: 0 PER 100 WBC
PLATELET # BLD AUTO: 227 K/UL (ref 138–453)
PMV BLD AUTO: 8.8 FL (ref 8.1–13.5)
POTASSIUM SERPL-SCNC: 4.1 MMOL/L (ref 3.7–5.3)
PROT SERPL-MCNC: 7.5 G/DL (ref 6.6–8.7)
RBC # BLD AUTO: 4.13 M/UL (ref 4.21–5.77)
RBC # BLD: ABNORMAL 10*6/UL
SODIUM SERPL-SCNC: 138 MMOL/L (ref 136–145)
WBC OTHER # BLD: 5.6 K/UL (ref 3.5–11.3)

## 2025-03-04 PROCEDURE — 83735 ASSAY OF MAGNESIUM: CPT

## 2025-03-04 PROCEDURE — 85025 COMPLETE CBC W/AUTO DIFF WBC: CPT

## 2025-03-04 PROCEDURE — 96417 CHEMO IV INFUS EACH ADDL SEQ: CPT

## 2025-03-04 PROCEDURE — 96375 TX/PRO/DX INJ NEW DRUG ADDON: CPT

## 2025-03-04 PROCEDURE — 6360000002 HC RX W HCPCS: Performed by: INTERNAL MEDICINE

## 2025-03-04 PROCEDURE — 96415 CHEMO IV INFUSION ADDL HR: CPT

## 2025-03-04 PROCEDURE — 96413 CHEMO IV INFUSION 1 HR: CPT

## 2025-03-04 PROCEDURE — 80053 COMPREHEN METABOLIC PANEL: CPT

## 2025-03-04 PROCEDURE — 2580000003 HC RX 258: Performed by: INTERNAL MEDICINE

## 2025-03-04 PROCEDURE — 96416 CHEMO PROLONG INFUSE W/PUMP: CPT

## 2025-03-04 PROCEDURE — 96411 CHEMO IV PUSH ADDL DRUG: CPT

## 2025-03-04 PROCEDURE — 96368 THER/DIAG CONCURRENT INF: CPT

## 2025-03-04 RX ORDER — ATROPINE SULFATE 0.4 MG/ML
0.4 INJECTION, SOLUTION INTRAVENOUS ONCE
Status: COMPLETED | OUTPATIENT
Start: 2025-03-04 | End: 2025-03-04

## 2025-03-04 RX ORDER — SODIUM CHLORIDE 9 MG/ML
5-250 INJECTION, SOLUTION INTRAVENOUS PRN
Status: DISCONTINUED | OUTPATIENT
Start: 2025-03-04 | End: 2025-03-05 | Stop reason: HOSPADM

## 2025-03-04 RX ORDER — FLUOROURACIL 50 MG/ML
400 INJECTION, SOLUTION INTRAVENOUS ONCE
Status: COMPLETED | OUTPATIENT
Start: 2025-03-04 | End: 2025-03-04

## 2025-03-04 RX ORDER — DEXAMETHASONE SODIUM PHOSPHATE 10 MG/ML
10 INJECTION, SOLUTION INTRA-ARTICULAR; INTRALESIONAL; INTRAMUSCULAR; INTRAVENOUS; SOFT TISSUE ONCE
Status: COMPLETED | OUTPATIENT
Start: 2025-03-04 | End: 2025-03-04

## 2025-03-04 RX ORDER — PALONOSETRON 0.05 MG/ML
0.25 INJECTION, SOLUTION INTRAVENOUS ONCE
Status: COMPLETED | OUTPATIENT
Start: 2025-03-04 | End: 2025-03-04

## 2025-03-04 RX ADMIN — PANITUMUMAB 600 MG: 400 SOLUTION INTRAVENOUS at 11:48

## 2025-03-04 RX ADMIN — PALONOSETRON 0.25 MG: 0.05 INJECTION, SOLUTION INTRAVENOUS at 11:12

## 2025-03-04 RX ADMIN — FLUOROURACIL 850 MG: 50 INJECTION, SOLUTION INTRAVENOUS at 15:14

## 2025-03-04 RX ADMIN — LEUCOVORIN CALCIUM 800 MG: 100 INJECTION, POWDER, LYOPHILIZED, FOR SUSPENSION INTRAMUSCULAR; INTRAVENOUS at 13:16

## 2025-03-04 RX ADMIN — DEXAMETHASONE SODIUM PHOSPHATE 10 MG: 10 INJECTION INTRAMUSCULAR; INTRAVENOUS at 11:12

## 2025-03-04 RX ADMIN — SODIUM CHLORIDE 25 ML/HR: 0.9 INJECTION, SOLUTION INTRAVENOUS at 09:53

## 2025-03-04 RX ADMIN — ATROPINE SULFATE 0.4 MG: 0.4 INJECTION, SOLUTION INTRAVENOUS at 13:02

## 2025-03-04 RX ADMIN — FLUOROURACIL 5000 MG: 50 INJECTION, SOLUTION INTRAVENOUS at 15:14

## 2025-03-04 RX ADMIN — IRINOTECAN HYDROCHLORIDE 380 MG: 20 INJECTION, SOLUTION INTRAVENOUS at 13:18

## 2025-03-04 ASSESSMENT — PAIN DESCRIPTION - LOCATION: LOCATION: LEG

## 2025-03-04 ASSESSMENT — PAIN SCALES - GENERAL: PAINLEVEL_OUTOF10: 5

## 2025-03-04 ASSESSMENT — PAIN DESCRIPTION - ORIENTATION: ORIENTATION: RIGHT

## 2025-03-04 NOTE — PROGRESS NOTES
Patient presents ambulatory C2D1 treatment. VS and weight as charted. Medications and allergies reviewed. Patient states he is having increased right calf pain. Rash is improved that patient states he started having one day after last treatment.     Writer made aware that patient was supposed to take doxycycline if rash appeared but patient states he never took. Writer updated zack Starr to proceed with treatment today.     Patient premedicated. Vectibix administered with no adverse reactions noted, line flushed. Atropine administered. Irinotecan and leucovorin administered concurrently with no adverse reactions noted. Lines flushed.     5FU push administered with no adverse reactions noted, blood return noted throughout. Patient connected to CADD pump and verified by second nurse. Infusion started prior to patient leaving. Pt discharged ambulatory and aware of next appointment to removed CADD pump.

## 2025-03-06 ENCOUNTER — HOSPITAL ENCOUNTER (OUTPATIENT)
Dept: INFUSION THERAPY | Facility: MEDICAL CENTER | Age: 45
Discharge: HOME | End: 2025-03-06
Payer: COMMERCIAL

## 2025-03-06 VITALS
TEMPERATURE: 97.7 F | RESPIRATION RATE: 18 BRPM | SYSTOLIC BLOOD PRESSURE: 130 MMHG | DIASTOLIC BLOOD PRESSURE: 75 MMHG | HEART RATE: 67 BPM

## 2025-03-06 PROCEDURE — 2500000003 HC RX 250 WO HCPCS: Performed by: INTERNAL MEDICINE

## 2025-03-06 PROCEDURE — 96374 THER/PROPH/DIAG INJ IV PUSH: CPT

## 2025-03-06 PROCEDURE — 6360000002 HC RX W HCPCS: Performed by: INTERNAL MEDICINE

## 2025-03-06 RX ORDER — HEPARIN 100 UNIT/ML
500 SYRINGE INTRAVENOUS PRN
Status: DISCONTINUED | OUTPATIENT
Start: 2025-03-06 | End: 2025-03-07 | Stop reason: HOSPADM

## 2025-03-06 RX ORDER — SODIUM CHLORIDE 0.9 % (FLUSH) 0.9 %
5-40 SYRINGE (ML) INJECTION PRN
Status: DISCONTINUED | OUTPATIENT
Start: 2025-03-06 | End: 2025-03-07 | Stop reason: HOSPADM

## 2025-03-06 RX ADMIN — SODIUM CHLORIDE, PRESERVATIVE FREE 10 ML: 5 INJECTION INTRAVENOUS at 13:32

## 2025-03-06 RX ADMIN — HEPARIN 500 UNITS: 100 SYRINGE at 13:32

## 2025-03-11 ENCOUNTER — TELEPHONE (OUTPATIENT)
Dept: ONCOLOGY | Age: 45
End: 2025-03-11

## 2025-03-11 NOTE — TELEPHONE ENCOUNTER
[facility] Oncology Nutrition Note:   Chart reviewed/called patient for nutrition follow-up and d/t referral (second attempt). No answer. Detailed message left encouraging patient to return call as able. Writer's contact information provided.     Maryan Silver RD, LD, CNSC  Registered Dietitian  [facility]  [phone]

## 2025-03-14 ENCOUNTER — TELEPHONE (OUTPATIENT)
Dept: PRIMARY CARE CLINIC | Age: 45
End: 2025-03-14

## 2025-03-14 NOTE — TELEPHONE ENCOUNTER
Homa ([phone]) with [facility] called in to inform the pt is getting weaker and is going to do a therapy eval. GRAYSON

## 2025-03-15 ENCOUNTER — HOSPITAL ENCOUNTER (OUTPATIENT)
Facility: MEDICAL CENTER | Age: 45
End: 2025-03-15
Payer: COMMERCIAL

## 2025-03-18 ENCOUNTER — HOSPITAL ENCOUNTER (OUTPATIENT)
Dept: INFUSION THERAPY | Facility: MEDICAL CENTER | Age: 45
Discharge: HOME | End: 2025-03-18
Payer: COMMERCIAL

## 2025-03-18 VITALS
SYSTOLIC BLOOD PRESSURE: 109 MMHG | DIASTOLIC BLOOD PRESSURE: 74 MMHG | RESPIRATION RATE: 18 BRPM | TEMPERATURE: 98.4 F | BODY MASS INDEX: 33.99 KG/M2 | HEART RATE: 90 BPM | WEIGHT: 217 LBS

## 2025-03-18 DIAGNOSIS — C18.7 CANCER OF SIGMOID (HCC): Primary | ICD-10-CM

## 2025-03-18 DIAGNOSIS — C77.1 METASTASIS TO MEDIASTINAL LYMPH NODE (HCC): ICD-10-CM

## 2025-03-18 DIAGNOSIS — C18.7 CANCER OF SIGMOID (HCC): ICD-10-CM

## 2025-03-18 DIAGNOSIS — C77.1 METASTASIS TO MEDIASTINAL LYMPH NODE (HCC): Primary | ICD-10-CM

## 2025-03-18 LAB
ALBUMIN SERPL-MCNC: 4.1 G/DL (ref 3.5–5.2)
ALBUMIN/GLOB SERPL: 1 {RATIO} (ref 1–2.5)
ALP SERPL-CCNC: 106 U/L (ref 40–129)
ALT SERPL-CCNC: 13 U/L (ref 10–50)
ANION GAP SERPL CALCULATED.3IONS-SCNC: 12 MMOL/L (ref 9–16)
AST SERPL-CCNC: 30 U/L (ref 10–50)
BASOPHILS # BLD: 0 K/UL (ref 0–0.2)
BASOPHILS NFR BLD: 0 % (ref 0–2)
BILIRUB SERPL-MCNC: 0.4 MG/DL (ref 0–1.2)
BUN SERPL-MCNC: 8 MG/DL (ref 6–20)
CALCIUM SERPL-MCNC: 9.5 MG/DL (ref 8.6–10.4)
CEA SERPL-MCNC: 133 NG/ML (ref 0–3.8)
CHLORIDE SERPL-SCNC: 101 MMOL/L (ref 98–107)
CO2 SERPL-SCNC: 26 MMOL/L (ref 20–31)
CREAT SERPL-MCNC: 1 MG/DL (ref 0.7–1.2)
EOSINOPHIL # BLD: 0.22 K/UL (ref 0–0.44)
EOSINOPHILS RELATIVE PERCENT: 5 % (ref 1–4)
ERYTHROCYTE [DISTWIDTH] IN BLOOD BY AUTOMATED COUNT: 19.1 % (ref 11.8–14.4)
GFR, ESTIMATED: >90 ML/MIN/1.73M2
GLUCOSE SERPL-MCNC: 95 MG/DL (ref 74–99)
HCT VFR BLD AUTO: 37.7 % (ref 40.7–50.3)
HGB BLD-MCNC: 12.5 G/DL (ref 13–17)
IMM GRANULOCYTES # BLD AUTO: 0 K/UL (ref 0–0.3)
IMM GRANULOCYTES NFR BLD: 0 %
LYMPHOCYTES NFR BLD: 1.06 K/UL (ref 1.1–3.7)
LYMPHOCYTES RELATIVE PERCENT: 24 % (ref 24–43)
MAGNESIUM SERPL-MCNC: 1.9 MG/DL (ref 1.6–2.6)
MCH RBC QN AUTO: 28.5 PG (ref 25.2–33.5)
MCHC RBC AUTO-ENTMCNC: 33.2 G/DL (ref 28.4–34.8)
MCV RBC AUTO: 86.1 FL (ref 82.6–102.9)
MONOCYTES NFR BLD: 0.48 K/UL (ref 0.1–1.2)
MONOCYTES NFR BLD: 11 % (ref 3–12)
NEUTROPHILS NFR BLD: 60 % (ref 36–65)
NEUTS SEG NFR BLD: 2.64 K/UL (ref 1.5–8.1)
NRBC BLD-RTO: 0 PER 100 WBC
PLATELET # BLD AUTO: ABNORMAL K/UL (ref 138–453)
PLATELET, FLUORESCENCE: 195 K/UL (ref 138–453)
PLATELETS.RETICULATED NFR BLD AUTO: 3.6 % (ref 1.1–10.3)
POTASSIUM SERPL-SCNC: 4.1 MMOL/L (ref 3.7–5.3)
PROT SERPL-MCNC: 8 G/DL (ref 6.6–8.7)
RBC # BLD AUTO: 4.38 M/UL (ref 4.21–5.77)
SODIUM SERPL-SCNC: 140 MMOL/L (ref 136–145)
WBC OTHER # BLD: 4.4 K/UL (ref 3.5–11.3)

## 2025-03-18 PROCEDURE — 96411 CHEMO IV PUSH ADDL DRUG: CPT

## 2025-03-18 PROCEDURE — 80053 COMPREHEN METABOLIC PANEL: CPT

## 2025-03-18 PROCEDURE — 82378 CARCINOEMBRYONIC ANTIGEN: CPT

## 2025-03-18 PROCEDURE — 96417 CHEMO IV INFUS EACH ADDL SEQ: CPT

## 2025-03-18 PROCEDURE — 85055 RETICULATED PLATELET ASSAY: CPT

## 2025-03-18 PROCEDURE — 96413 CHEMO IV INFUSION 1 HR: CPT

## 2025-03-18 PROCEDURE — 36415 COLL VENOUS BLD VENIPUNCTURE: CPT

## 2025-03-18 PROCEDURE — 96415 CHEMO IV INFUSION ADDL HR: CPT

## 2025-03-18 PROCEDURE — 85025 COMPLETE CBC W/AUTO DIFF WBC: CPT

## 2025-03-18 PROCEDURE — 6360000002 HC RX W HCPCS: Performed by: INTERNAL MEDICINE

## 2025-03-18 PROCEDURE — 2500000003 HC RX 250 WO HCPCS: Performed by: INTERNAL MEDICINE

## 2025-03-18 PROCEDURE — 96416 CHEMO PROLONG INFUSE W/PUMP: CPT

## 2025-03-18 PROCEDURE — 36593 DECLOT VASCULAR DEVICE: CPT

## 2025-03-18 PROCEDURE — 83735 ASSAY OF MAGNESIUM: CPT

## 2025-03-18 PROCEDURE — 2580000003 HC RX 258: Performed by: INTERNAL MEDICINE

## 2025-03-18 PROCEDURE — 96368 THER/DIAG CONCURRENT INF: CPT

## 2025-03-18 PROCEDURE — 96375 TX/PRO/DX INJ NEW DRUG ADDON: CPT

## 2025-03-18 RX ORDER — ONDANSETRON 2 MG/ML
8 INJECTION INTRAMUSCULAR; INTRAVENOUS
OUTPATIENT
Start: 2025-03-18

## 2025-03-18 RX ORDER — HEPARIN SODIUM (PORCINE) LOCK FLUSH IV SOLN 100 UNIT/ML 100 UNIT/ML
500 SOLUTION INTRAVENOUS PRN
Status: CANCELLED | OUTPATIENT
Start: 2025-03-18

## 2025-03-18 RX ORDER — SODIUM CHLORIDE 9 MG/ML
5-250 INJECTION, SOLUTION INTRAVENOUS PRN
Status: DISCONTINUED | OUTPATIENT
Start: 2025-03-18 | End: 2025-03-19 | Stop reason: HOSPADM

## 2025-03-18 RX ORDER — ACETAMINOPHEN 325 MG/1
650 TABLET ORAL
Status: CANCELLED | OUTPATIENT
Start: 2025-03-18

## 2025-03-18 RX ORDER — SODIUM CHLORIDE 9 MG/ML
5-250 INJECTION, SOLUTION INTRAVENOUS PRN
Status: CANCELLED | OUTPATIENT
Start: 2025-03-20

## 2025-03-18 RX ORDER — FLUOROURACIL 50 MG/ML
400 INJECTION, SOLUTION INTRAVENOUS ONCE
Status: COMPLETED | OUTPATIENT
Start: 2025-03-18 | End: 2025-03-18

## 2025-03-18 RX ORDER — HEPARIN SODIUM (PORCINE) LOCK FLUSH IV SOLN 100 UNIT/ML 100 UNIT/ML
500 SOLUTION INTRAVENOUS PRN
Status: CANCELLED | OUTPATIENT
Start: 2025-03-20

## 2025-03-18 RX ORDER — DOXYCYCLINE 100 MG/1
100 CAPSULE ORAL 2 TIMES DAILY
Qty: 14 CAPSULE | Refills: 0 | Status: SHIPPED | OUTPATIENT
Start: 2025-03-18 | End: 2025-03-25

## 2025-03-18 RX ORDER — EPINEPHRINE 1 MG/ML
0.3 INJECTION, SOLUTION, CONCENTRATE INTRAVENOUS PRN
Status: CANCELLED | OUTPATIENT
Start: 2025-03-18

## 2025-03-18 RX ORDER — DEXAMETHASONE SODIUM PHOSPHATE 10 MG/ML
10 INJECTION, SOLUTION INTRA-ARTICULAR; INTRALESIONAL; INTRAMUSCULAR; INTRAVENOUS; SOFT TISSUE ONCE
Status: COMPLETED | OUTPATIENT
Start: 2025-03-18 | End: 2025-03-18

## 2025-03-18 RX ORDER — SODIUM CHLORIDE 9 MG/ML
5-250 INJECTION, SOLUTION INTRAVENOUS PRN
Status: CANCELLED | OUTPATIENT
Start: 2025-03-18

## 2025-03-18 RX ORDER — FAMOTIDINE 10 MG/ML
20 INJECTION, SOLUTION INTRAVENOUS
Status: CANCELLED | OUTPATIENT
Start: 2025-03-18

## 2025-03-18 RX ORDER — ATROPINE SULFATE 0.4 MG/ML
0.4 INJECTION, SOLUTION INTRAVENOUS ONCE
Status: COMPLETED | OUTPATIENT
Start: 2025-03-18 | End: 2025-03-18

## 2025-03-18 RX ORDER — MEPERIDINE HYDROCHLORIDE 50 MG/ML
12.5 INJECTION INTRAMUSCULAR; INTRAVENOUS; SUBCUTANEOUS PRN
Status: CANCELLED | OUTPATIENT
Start: 2025-03-18

## 2025-03-18 RX ORDER — DIPHENHYDRAMINE HYDROCHLORIDE 50 MG/ML
50 INJECTION, SOLUTION INTRAMUSCULAR; INTRAVENOUS
Status: CANCELLED | OUTPATIENT
Start: 2025-03-18

## 2025-03-18 RX ORDER — HYDROCORTISONE SODIUM SUCCINATE 100 MG/2ML
100 INJECTION INTRAMUSCULAR; INTRAVENOUS
Status: CANCELLED | OUTPATIENT
Start: 2025-03-18

## 2025-03-18 RX ORDER — SODIUM CHLORIDE 0.9 % (FLUSH) 0.9 %
5-40 SYRINGE (ML) INJECTION PRN
Status: CANCELLED | OUTPATIENT
Start: 2025-03-18

## 2025-03-18 RX ORDER — HEPARIN 100 UNIT/ML
500 SYRINGE INTRAVENOUS PRN
OUTPATIENT
Start: 2025-03-18

## 2025-03-18 RX ORDER — SODIUM CHLORIDE 0.9 % (FLUSH) 0.9 %
5-40 SYRINGE (ML) INJECTION PRN
OUTPATIENT
Start: 2025-03-18

## 2025-03-18 RX ORDER — SODIUM CHLORIDE 0.9 % (FLUSH) 0.9 %
5-40 SYRINGE (ML) INJECTION PRN
Status: CANCELLED | OUTPATIENT
Start: 2025-03-20

## 2025-03-18 RX ORDER — SODIUM CHLORIDE 9 MG/ML
25 INJECTION, SOLUTION INTRAVENOUS PRN
OUTPATIENT
Start: 2025-03-18

## 2025-03-18 RX ORDER — FLUOROURACIL 50 MG/ML
400 INJECTION, SOLUTION INTRAVENOUS ONCE
Status: CANCELLED | OUTPATIENT
Start: 2025-03-18 | End: 2025-03-18

## 2025-03-18 RX ORDER — ONDANSETRON 2 MG/ML
8 INJECTION INTRAMUSCULAR; INTRAVENOUS
Status: CANCELLED | OUTPATIENT
Start: 2025-03-18

## 2025-03-18 RX ORDER — ALBUTEROL SULFATE 90 UG/1
4 INHALANT RESPIRATORY (INHALATION) PRN
Status: CANCELLED | OUTPATIENT
Start: 2025-03-18

## 2025-03-18 RX ORDER — SODIUM CHLORIDE 9 MG/ML
INJECTION, SOLUTION INTRAVENOUS CONTINUOUS
Status: CANCELLED | OUTPATIENT
Start: 2025-03-18

## 2025-03-18 RX ORDER — PALONOSETRON 0.05 MG/ML
0.25 INJECTION, SOLUTION INTRAVENOUS ONCE
Status: COMPLETED | OUTPATIENT
Start: 2025-03-18 | End: 2025-03-18

## 2025-03-18 RX ADMIN — SODIUM CHLORIDE 20 ML/HR: 0.9 INJECTION, SOLUTION INTRAVENOUS at 11:40

## 2025-03-18 RX ADMIN — FLUOROURACIL 850 MG: 50 INJECTION, SOLUTION INTRAVENOUS at 15:25

## 2025-03-18 RX ADMIN — ALTEPLASE 2 MG: 2.2 INJECTION, POWDER, LYOPHILIZED, FOR SOLUTION INTRAVENOUS at 10:44

## 2025-03-18 RX ADMIN — IRINOTECAN HYDROCHLORIDE 380 MG: 20 INJECTION, SOLUTION INTRAVENOUS at 13:32

## 2025-03-18 RX ADMIN — ATROPINE SULFATE 0.4 MG: 0.4 INJECTION, SOLUTION INTRAVENOUS at 13:25

## 2025-03-18 RX ADMIN — PANITUMUMAB 600 MG: 400 SOLUTION INTRAVENOUS at 12:22

## 2025-03-18 RX ADMIN — LEUCOVORIN CALCIUM 800 MG: 100 INJECTION, POWDER, LYOPHILIZED, FOR SUSPENSION INTRAMUSCULAR; INTRAVENOUS at 13:31

## 2025-03-18 RX ADMIN — DEXAMETHASONE SODIUM PHOSPHATE 10 MG: 10 INJECTION INTRAMUSCULAR; INTRAVENOUS at 11:50

## 2025-03-18 RX ADMIN — FLUOROURACIL 5000 MG: 50 INJECTION, SOLUTION INTRAVENOUS at 15:25

## 2025-03-18 RX ADMIN — PALONOSETRON 0.25 MG: 0.05 INJECTION, SOLUTION INTRAVENOUS at 11:49

## 2025-03-18 ASSESSMENT — PAIN DESCRIPTION - ORIENTATION: ORIENTATION: RIGHT

## 2025-03-18 ASSESSMENT — PAIN SCALES - GENERAL: PAINLEVEL_OUTOF10: 4

## 2025-03-18 ASSESSMENT — PAIN DESCRIPTION - LOCATION: LOCATION: LEG

## 2025-03-18 NOTE — PROGRESS NOTES
Patient arrived ambulatory per self for cycle 3 day 1 treatment.   Patient states his rash has stayed the same. Denies other complaint or concern.  Port accessed by Carolina CURRAN, no blood return.  Lab to bedside to draw labs. Cath dariusz instilled. Blood return post administration.  Labs reviewed.   Patient missed his MD visit last week. Writer everton served Dr. Sanchez and notified him of continued but stable rash; orders per MD to continue treatment and send 7 day doxycycline script for patient. Script routed to MD for signature.  Patient premedicated, line flushed.  Vectibix infused without issue, line flushed.  Atropine given, line flushed.   Irinotecan and leucovorin infused concurrently without issue, line flushed.  5FU push given with brisk blood return present throughout.  Patient connected to CADD pump, verified infusing prior to patient discharge and verified by second RN.  Patient discharged. Will schedule MD visit when it is confirmed when MD is back in office.

## 2025-03-20 ENCOUNTER — HOSPITAL ENCOUNTER (OUTPATIENT)
Facility: MEDICAL CENTER | Age: 45
End: 2025-03-20
Payer: COMMERCIAL

## 2025-03-20 ENCOUNTER — HOSPITAL ENCOUNTER (OUTPATIENT)
Dept: INFUSION THERAPY | Facility: MEDICAL CENTER | Age: 45
Discharge: HOME | End: 2025-03-20
Payer: COMMERCIAL

## 2025-03-20 VITALS
HEART RATE: 74 BPM | DIASTOLIC BLOOD PRESSURE: 78 MMHG | SYSTOLIC BLOOD PRESSURE: 103 MMHG | TEMPERATURE: 97.5 F | RESPIRATION RATE: 18 BRPM

## 2025-03-20 DIAGNOSIS — C18.7 CANCER OF SIGMOID (HCC): Primary | ICD-10-CM

## 2025-03-20 PROCEDURE — 6360000002 HC RX W HCPCS: Performed by: INTERNAL MEDICINE

## 2025-03-20 PROCEDURE — 2500000003 HC RX 250 WO HCPCS: Performed by: INTERNAL MEDICINE

## 2025-03-20 RX ORDER — SODIUM CHLORIDE 0.9 % (FLUSH) 0.9 %
5-40 SYRINGE (ML) INJECTION PRN
Status: DISCONTINUED | OUTPATIENT
Start: 2025-03-20 | End: 2025-03-21 | Stop reason: HOSPADM

## 2025-03-20 RX ORDER — HEPARIN 100 UNIT/ML
500 SYRINGE INTRAVENOUS PRN
Status: DISCONTINUED | OUTPATIENT
Start: 2025-03-20 | End: 2025-03-21 | Stop reason: HOSPADM

## 2025-03-20 RX ADMIN — SODIUM CHLORIDE, PRESERVATIVE FREE 20 ML: 5 INJECTION INTRAVENOUS at 14:13

## 2025-03-20 RX ADMIN — HEPARIN 500 UNITS: 100 SYRINGE at 14:13

## 2025-03-20 ASSESSMENT — PAIN SCALES - GENERAL: PAINLEVEL_OUTOF10: 3

## 2025-03-20 ASSESSMENT — PAIN DESCRIPTION - LOCATION: LOCATION: ABDOMEN

## 2025-03-20 NOTE — PROGRESS NOTES
Patient arrive ambulatory for chemo pump removal.  Patient states he is having some abdominal discomfort and passed hard stool earlier. Denies other complaint or concern.  Vitals as charted.  Writer further assessed abdominal symptoms; abdomen soft, patient passing flatulence through ostomy and while in office also softer stool, is able to eat, no vomiting. Writer offered if he wanted to be evaluated further, he declines need and states this happens from time to time.   Writer educated patient on s/s of bowel obstruction and when to report to the ER. Writer also notified him he can take a stool softener if hard stools return. He verbalizes understanding.  Pump cartridge empty upon arrival to unit. Pump removed; port flushed and heparinized with intact sprague needle removed per protocol.  Patient ambulate off unit per self at discharge.

## 2025-04-01 ENCOUNTER — HOSPITAL ENCOUNTER (OUTPATIENT)
Dept: INFUSION THERAPY | Facility: MEDICAL CENTER | Age: 45
Discharge: HOME | End: 2025-04-01
Payer: COMMERCIAL

## 2025-04-01 ENCOUNTER — HOSPITAL ENCOUNTER (OUTPATIENT)
Facility: MEDICAL CENTER | Age: 45
End: 2025-04-01
Payer: COMMERCIAL

## 2025-04-01 VITALS
TEMPERATURE: 98.1 F | SYSTOLIC BLOOD PRESSURE: 103 MMHG | DIASTOLIC BLOOD PRESSURE: 74 MMHG | RESPIRATION RATE: 18 BRPM | WEIGHT: 202.6 LBS | BODY MASS INDEX: 31.73 KG/M2 | HEART RATE: 77 BPM

## 2025-04-01 DIAGNOSIS — C18.7 CANCER OF SIGMOID (HCC): Primary | ICD-10-CM

## 2025-04-01 DIAGNOSIS — C77.1 METASTASIS TO MEDIASTINAL LYMPH NODE (HCC): ICD-10-CM

## 2025-04-01 LAB
ALBUMIN SERPL-MCNC: 3.8 G/DL (ref 3.5–5.2)
ALBUMIN/GLOB SERPL: 1 {RATIO} (ref 1–2.5)
ALP SERPL-CCNC: 100 U/L (ref 40–129)
ALT SERPL-CCNC: 15 U/L (ref 10–50)
ANION GAP SERPL CALCULATED.3IONS-SCNC: 11 MMOL/L (ref 9–16)
AST SERPL-CCNC: 21 U/L (ref 10–50)
BASOPHILS # BLD: <0.03 K/UL (ref 0–0.2)
BASOPHILS NFR BLD: 0 % (ref 0–2)
BILIRUB SERPL-MCNC: 0.4 MG/DL (ref 0–1.2)
BUN SERPL-MCNC: 7 MG/DL (ref 6–20)
CALCIUM SERPL-MCNC: 9.3 MG/DL (ref 8.6–10.4)
CEA SERPL-MCNC: 152 NG/ML (ref 0–3.8)
CHLORIDE SERPL-SCNC: 100 MMOL/L (ref 98–107)
CO2 SERPL-SCNC: 26 MMOL/L (ref 20–31)
CREAT SERPL-MCNC: 0.9 MG/DL (ref 0.7–1.2)
EOSINOPHIL # BLD: 0.15 K/UL (ref 0–0.44)
EOSINOPHILS RELATIVE PERCENT: 2 % (ref 1–4)
ERYTHROCYTE [DISTWIDTH] IN BLOOD BY AUTOMATED COUNT: 17.2 % (ref 11.8–14.4)
GFR, ESTIMATED: >90 ML/MIN/1.73M2
GLUCOSE SERPL-MCNC: 109 MG/DL (ref 74–99)
HCT VFR BLD AUTO: 35.2 % (ref 40.7–50.3)
HGB BLD-MCNC: 11.7 G/DL (ref 13–17)
IMM GRANULOCYTES # BLD AUTO: 0.02 K/UL (ref 0–0.3)
IMM GRANULOCYTES NFR BLD: 0 %
LYMPHOCYTES NFR BLD: 1.21 K/UL (ref 1.1–3.7)
LYMPHOCYTES RELATIVE PERCENT: 16 % (ref 24–43)
MAGNESIUM SERPL-MCNC: 2.1 MG/DL (ref 1.6–2.6)
MCH RBC QN AUTO: 28.5 PG (ref 25.2–33.5)
MCHC RBC AUTO-ENTMCNC: 33.2 G/DL (ref 28.4–34.8)
MCV RBC AUTO: 85.9 FL (ref 82.6–102.9)
MONOCYTES NFR BLD: 0.79 K/UL (ref 0.1–1.2)
MONOCYTES NFR BLD: 10 % (ref 3–12)
NEUTROPHILS NFR BLD: 72 % (ref 36–65)
NEUTS SEG NFR BLD: 5.4 K/UL (ref 1.5–8.1)
NRBC BLD-RTO: 0 PER 100 WBC
PLATELET # BLD AUTO: 399 K/UL (ref 138–453)
PMV BLD AUTO: 8.8 FL (ref 8.1–13.5)
POTASSIUM SERPL-SCNC: 4 MMOL/L (ref 3.7–5.3)
PROT SERPL-MCNC: 7.6 G/DL (ref 6.6–8.7)
RBC # BLD AUTO: 4.1 M/UL (ref 4.21–5.77)
RBC # BLD: ABNORMAL 10*6/UL
SODIUM SERPL-SCNC: 137 MMOL/L (ref 136–145)
WBC OTHER # BLD: 7.6 K/UL (ref 3.5–11.3)

## 2025-04-01 PROCEDURE — 2500000003 HC RX 250 WO HCPCS: Performed by: INTERNAL MEDICINE

## 2025-04-01 PROCEDURE — 85025 COMPLETE CBC W/AUTO DIFF WBC: CPT

## 2025-04-01 PROCEDURE — 96416 CHEMO PROLONG INFUSE W/PUMP: CPT

## 2025-04-01 PROCEDURE — 6360000002 HC RX W HCPCS: Performed by: INTERNAL MEDICINE

## 2025-04-01 PROCEDURE — 96413 CHEMO IV INFUSION 1 HR: CPT

## 2025-04-01 PROCEDURE — 96411 CHEMO IV PUSH ADDL DRUG: CPT

## 2025-04-01 PROCEDURE — 80053 COMPREHEN METABOLIC PANEL: CPT

## 2025-04-01 PROCEDURE — 83735 ASSAY OF MAGNESIUM: CPT

## 2025-04-01 PROCEDURE — 96375 TX/PRO/DX INJ NEW DRUG ADDON: CPT

## 2025-04-01 PROCEDURE — 2580000003 HC RX 258: Performed by: INTERNAL MEDICINE

## 2025-04-01 PROCEDURE — 96417 CHEMO IV INFUS EACH ADDL SEQ: CPT

## 2025-04-01 PROCEDURE — 82378 CARCINOEMBRYONIC ANTIGEN: CPT

## 2025-04-01 PROCEDURE — 96368 THER/DIAG CONCURRENT INF: CPT

## 2025-04-01 RX ORDER — FLUOROURACIL 50 MG/ML
400 INJECTION, SOLUTION INTRAVENOUS ONCE
Status: COMPLETED | OUTPATIENT
Start: 2025-04-01 | End: 2025-04-01

## 2025-04-01 RX ORDER — SODIUM CHLORIDE 0.9 % (FLUSH) 0.9 %
5-40 SYRINGE (ML) INJECTION PRN
Status: DISCONTINUED | OUTPATIENT
Start: 2025-04-01 | End: 2025-04-02 | Stop reason: HOSPADM

## 2025-04-01 RX ORDER — ONDANSETRON 2 MG/ML
8 INJECTION INTRAMUSCULAR; INTRAVENOUS
Status: CANCELLED | OUTPATIENT
Start: 2025-04-01

## 2025-04-01 RX ORDER — SODIUM CHLORIDE 0.9 % (FLUSH) 0.9 %
5-40 SYRINGE (ML) INJECTION PRN
OUTPATIENT
Start: 2025-04-03

## 2025-04-01 RX ORDER — EPINEPHRINE 1 MG/ML
0.3 INJECTION, SOLUTION, CONCENTRATE INTRAVENOUS PRN
Status: CANCELLED | OUTPATIENT
Start: 2025-04-01

## 2025-04-01 RX ORDER — SODIUM CHLORIDE 9 MG/ML
5-250 INJECTION, SOLUTION INTRAVENOUS PRN
Status: DISCONTINUED | OUTPATIENT
Start: 2025-04-01 | End: 2025-04-02 | Stop reason: HOSPADM

## 2025-04-01 RX ORDER — MAGNESIUM SULFATE IN WATER 40 MG/ML
2000 INJECTION, SOLUTION INTRAVENOUS PRN
Status: CANCELLED | OUTPATIENT
Start: 2025-04-01

## 2025-04-01 RX ORDER — ATROPINE SULFATE 0.4 MG/ML
0.4 INJECTION, SOLUTION INTRAVENOUS ONCE
Status: CANCELLED | OUTPATIENT
Start: 2025-04-01 | End: 2025-04-01

## 2025-04-01 RX ORDER — ATROPINE SULFATE 0.4 MG/ML
0.4 INJECTION, SOLUTION INTRAVENOUS
Status: CANCELLED | OUTPATIENT
Start: 2025-04-01

## 2025-04-01 RX ORDER — SODIUM CHLORIDE 9 MG/ML
5-250 INJECTION, SOLUTION INTRAVENOUS PRN
OUTPATIENT
Start: 2025-04-03

## 2025-04-01 RX ORDER — DIPHENHYDRAMINE HYDROCHLORIDE 50 MG/ML
50 INJECTION, SOLUTION INTRAMUSCULAR; INTRAVENOUS
Status: CANCELLED | OUTPATIENT
Start: 2025-04-01

## 2025-04-01 RX ORDER — SODIUM CHLORIDE 0.9 % (FLUSH) 0.9 %
5-40 SYRINGE (ML) INJECTION PRN
Status: CANCELLED | OUTPATIENT
Start: 2025-04-01

## 2025-04-01 RX ORDER — HEPARIN 100 UNIT/ML
500 SYRINGE INTRAVENOUS PRN
Status: CANCELLED | OUTPATIENT
Start: 2025-04-01

## 2025-04-01 RX ORDER — PALONOSETRON 0.05 MG/ML
0.25 INJECTION, SOLUTION INTRAVENOUS ONCE
Status: CANCELLED | OUTPATIENT
Start: 2025-04-01 | End: 2025-04-01

## 2025-04-01 RX ORDER — ACETAMINOPHEN 325 MG/1
650 TABLET ORAL
Status: CANCELLED | OUTPATIENT
Start: 2025-04-01

## 2025-04-01 RX ORDER — ALBUTEROL SULFATE 90 UG/1
4 INHALANT RESPIRATORY (INHALATION) PRN
Status: CANCELLED | OUTPATIENT
Start: 2025-04-01

## 2025-04-01 RX ORDER — MEPERIDINE HYDROCHLORIDE 50 MG/ML
12.5 INJECTION INTRAMUSCULAR; INTRAVENOUS; SUBCUTANEOUS PRN
Status: CANCELLED | OUTPATIENT
Start: 2025-04-01

## 2025-04-01 RX ORDER — DEXAMETHASONE SODIUM PHOSPHATE 10 MG/ML
10 INJECTION, SOLUTION INTRA-ARTICULAR; INTRALESIONAL; INTRAMUSCULAR; INTRAVENOUS; SOFT TISSUE ONCE
Status: COMPLETED | OUTPATIENT
Start: 2025-04-01 | End: 2025-04-01

## 2025-04-01 RX ORDER — PALONOSETRON 0.05 MG/ML
0.25 INJECTION, SOLUTION INTRAVENOUS ONCE
Status: COMPLETED | OUTPATIENT
Start: 2025-04-01 | End: 2025-04-01

## 2025-04-01 RX ORDER — HEPARIN 100 UNIT/ML
500 SYRINGE INTRAVENOUS PRN
OUTPATIENT
Start: 2025-04-03

## 2025-04-01 RX ORDER — SODIUM CHLORIDE 9 MG/ML
INJECTION, SOLUTION INTRAVENOUS CONTINUOUS
Status: CANCELLED | OUTPATIENT
Start: 2025-04-01

## 2025-04-01 RX ORDER — SODIUM CHLORIDE 9 MG/ML
5-250 INJECTION, SOLUTION INTRAVENOUS PRN
Status: CANCELLED | OUTPATIENT
Start: 2025-04-01

## 2025-04-01 RX ORDER — DEXAMETHASONE SODIUM PHOSPHATE 10 MG/ML
10 INJECTION, SOLUTION INTRAMUSCULAR; INTRAVENOUS ONCE
Status: CANCELLED | OUTPATIENT
Start: 2025-04-01 | End: 2025-04-01

## 2025-04-01 RX ORDER — ATROPINE SULFATE 0.4 MG/ML
0.4 INJECTION, SOLUTION INTRAVENOUS ONCE
Status: COMPLETED | OUTPATIENT
Start: 2025-04-01 | End: 2025-04-01

## 2025-04-01 RX ORDER — HYDROCORTISONE SODIUM SUCCINATE 100 MG/2ML
100 INJECTION INTRAMUSCULAR; INTRAVENOUS
Status: CANCELLED | OUTPATIENT
Start: 2025-04-01

## 2025-04-01 RX ORDER — FAMOTIDINE 10 MG/ML
20 INJECTION, SOLUTION INTRAVENOUS
Status: CANCELLED | OUTPATIENT
Start: 2025-04-01

## 2025-04-01 RX ORDER — FLUOROURACIL 50 MG/ML
400 INJECTION, SOLUTION INTRAVENOUS ONCE
Status: CANCELLED | OUTPATIENT
Start: 2025-04-01 | End: 2025-04-01

## 2025-04-01 RX ADMIN — LEUCOVORIN CALCIUM 800 MG: 100 INJECTION, POWDER, LYOPHILIZED, FOR SUSPENSION INTRAMUSCULAR; INTRAVENOUS at 13:06

## 2025-04-01 RX ADMIN — DEXAMETHASONE SODIUM PHOSPHATE 10 MG: 10 INJECTION INTRAMUSCULAR; INTRAVENOUS at 11:12

## 2025-04-01 RX ADMIN — SODIUM CHLORIDE, PRESERVATIVE FREE 10 ML: 5 INJECTION INTRAVENOUS at 10:10

## 2025-04-01 RX ADMIN — SODIUM CHLORIDE 20 ML/HR: 0.9 INJECTION, SOLUTION INTRAVENOUS at 10:30

## 2025-04-01 RX ADMIN — IRINOTECAN HYDROCHLORIDE 380 MG: 20 INJECTION, SOLUTION INTRAVENOUS at 13:07

## 2025-04-01 RX ADMIN — FLUOROURACIL 850 MG: 50 INJECTION, SOLUTION INTRAVENOUS at 15:02

## 2025-04-01 RX ADMIN — PANITUMUMAB 600 MG: 400 SOLUTION INTRAVENOUS at 11:44

## 2025-04-01 RX ADMIN — FLUOROURACIL 5000 MG: 50 INJECTION, SOLUTION INTRAVENOUS at 15:02

## 2025-04-01 RX ADMIN — PALONOSETRON 0.25 MG: 0.05 INJECTION, SOLUTION INTRAVENOUS at 11:14

## 2025-04-01 RX ADMIN — ATROPINE SULFATE 0.4 MG: 0.4 INJECTION, SOLUTION INTRAVENOUS at 12:57

## 2025-04-01 NOTE — PROGRESS NOTES
Patient arrives ambulatory per self for C4 D1 tx  Patient complaints of fatigue - his rash improved. Patient stated the Doxy prescribed for rash was difficult for him to swallow;rash is better. Patient using aloe & Vitamin E lotion to moisturize skin  Encouraged patient to take the Doxy if rash worsens or call office  Patient weight loss of 6 # noted;patient states he is eating well enough  Labs drawn per port & reviewed  Within parameters to tx  Patient premedicated  Vextibix infused without incident;line flushed  Premedicated with Atropine  Irinotecan & Leucorvorin infused concurrently without incident & line flushed  5FU IVP administered with no adverse reaction via free flowing IV with blood return noted throughout.   5FU pump connected to infuse over 46 hours with settings verified by 2nd RN.  Infusion started prior to patient leaving.   Pt discharged ambulatory and aware of next appointment to remove pump

## 2025-04-02 ENCOUNTER — TELEPHONE (OUTPATIENT)
Dept: PRIMARY CARE CLINIC | Age: 45
End: 2025-04-02

## 2025-04-02 NOTE — TELEPHONE ENCOUNTER
Krishan Calix called to inform that this pt is no longer home bound and is going to discharge him from Pt.

## 2025-04-03 ENCOUNTER — HOSPITAL ENCOUNTER (OUTPATIENT)
Dept: INFUSION THERAPY | Facility: MEDICAL CENTER | Age: 45
Discharge: HOME | End: 2025-04-03
Payer: COMMERCIAL

## 2025-04-03 VITALS
TEMPERATURE: 97.3 F | HEART RATE: 63 BPM | DIASTOLIC BLOOD PRESSURE: 63 MMHG | RESPIRATION RATE: 18 BRPM | SYSTOLIC BLOOD PRESSURE: 101 MMHG

## 2025-04-03 DIAGNOSIS — C18.7 CANCER OF SIGMOID (HCC): Primary | ICD-10-CM

## 2025-04-03 PROCEDURE — 2500000003 HC RX 250 WO HCPCS: Performed by: INTERNAL MEDICINE

## 2025-04-03 PROCEDURE — 6360000002 HC RX W HCPCS: Performed by: INTERNAL MEDICINE

## 2025-04-03 RX ORDER — SODIUM CHLORIDE 0.9 % (FLUSH) 0.9 %
5-40 SYRINGE (ML) INJECTION PRN
OUTPATIENT
Start: 2025-04-03

## 2025-04-03 RX ORDER — SODIUM CHLORIDE 0.9 % (FLUSH) 0.9 %
5-40 SYRINGE (ML) INJECTION PRN
Status: DISCONTINUED | OUTPATIENT
Start: 2025-04-03 | End: 2025-04-04 | Stop reason: HOSPADM

## 2025-04-03 RX ORDER — HEPARIN 100 UNIT/ML
500 SYRINGE INTRAVENOUS PRN
Status: DISCONTINUED | OUTPATIENT
Start: 2025-04-03 | End: 2025-04-04 | Stop reason: HOSPADM

## 2025-04-03 RX ORDER — ONDANSETRON 2 MG/ML
8 INJECTION INTRAMUSCULAR; INTRAVENOUS
OUTPATIENT
Start: 2025-04-03

## 2025-04-03 RX ORDER — SODIUM CHLORIDE 9 MG/ML
25 INJECTION, SOLUTION INTRAVENOUS PRN
OUTPATIENT
Start: 2025-04-03

## 2025-04-03 RX ORDER — HEPARIN 100 UNIT/ML
500 SYRINGE INTRAVENOUS PRN
OUTPATIENT
Start: 2025-04-03

## 2025-04-03 RX ADMIN — HEPARIN 500 UNITS: 100 SYRINGE at 13:34

## 2025-04-03 RX ADMIN — SODIUM CHLORIDE, PRESERVATIVE FREE 10 ML: 5 INJECTION INTRAVENOUS at 13:34

## 2025-04-03 NOTE — PROGRESS NOTES
Pt arrives per amb per self and all 5-fu infused and no reactions or complaints and pt tolerated well and pump discontinued and pt discharged per amb per self and pt confirmed next appt.

## 2025-04-12 ENCOUNTER — HOSPITAL ENCOUNTER (OUTPATIENT)
Facility: MEDICAL CENTER | Age: 45
End: 2025-04-12

## 2025-04-14 ENCOUNTER — TELEPHONE (OUTPATIENT)
Dept: ONCOLOGY | Age: 45
End: 2025-04-14

## 2025-04-14 NOTE — TELEPHONE ENCOUNTER
[facility] Oncology Nutrition Note:  Chart reviewed/called patient for nutrition follow-up and d/t referral (third attempt). No answer. Detailed message left encouraging patient to return call with any current or future nutrition questions/concerns/needs. Contact information provided. Writer will follow as needed/per pt request.     Maryan Silver RD, LD, CNSC  Registered Dietitian  [facility]   [phone]

## 2025-04-15 ENCOUNTER — TELEPHONE (OUTPATIENT)
Dept: INFUSION THERAPY | Facility: MEDICAL CENTER | Age: 45
End: 2025-04-15

## 2025-04-15 NOTE — TELEPHONE ENCOUNTER
Call placed to patient as he missed treatment this morning. Patient states that he planned to call the office to reschedule treatment due to him oversleeping and needing to take care of his car payment.   Patient also states that he has had increased pain to his abdomen, as writer attempted to assess further, patient stated that he does not have pain rather a \"hard spot\" to his abdomen. He reports that he is passing stool. Writer offered MD visit tomorrow as he has not seen MD since February. Patient states that he is not able to take an appointment tomorrow.   Patient agreeable to rescheduling treatment at 4/22/25 and MD visit on 5/8/25 which is Dr. Sanchez's next availability.

## 2025-04-17 ENCOUNTER — APPOINTMENT (OUTPATIENT)
Dept: INFUSION THERAPY | Facility: MEDICAL CENTER | Age: 45
End: 2025-04-17
Payer: COMMERCIAL

## 2025-04-17 ENCOUNTER — HOSPITAL ENCOUNTER (OUTPATIENT)
Facility: MEDICAL CENTER | Age: 45
End: 2025-04-17
Payer: COMMERCIAL

## 2025-04-22 ENCOUNTER — HOSPITAL ENCOUNTER (OUTPATIENT)
Dept: INFUSION THERAPY | Facility: MEDICAL CENTER | Age: 45
Discharge: HOME | End: 2025-04-22
Payer: COMMERCIAL

## 2025-04-22 VITALS
SYSTOLIC BLOOD PRESSURE: 103 MMHG | RESPIRATION RATE: 18 BRPM | TEMPERATURE: 98.4 F | BODY MASS INDEX: 33.2 KG/M2 | WEIGHT: 212 LBS | HEART RATE: 85 BPM | DIASTOLIC BLOOD PRESSURE: 66 MMHG

## 2025-04-22 DIAGNOSIS — C18.7 CANCER OF SIGMOID (HCC): Primary | ICD-10-CM

## 2025-04-22 DIAGNOSIS — C23 GALLBLADDER CANCER (HCC): ICD-10-CM

## 2025-04-22 DIAGNOSIS — C77.1 METASTASIS TO MEDIASTINAL LYMPH NODE (HCC): ICD-10-CM

## 2025-04-22 LAB
ALBUMIN SERPL-MCNC: 3.8 G/DL (ref 3.5–5.2)
ALP SERPL-CCNC: 100 U/L (ref 40–129)
ALT SERPL-CCNC: 13 U/L (ref 10–50)
ANION GAP SERPL CALCULATED.3IONS-SCNC: 14 MMOL/L (ref 9–16)
AST SERPL-CCNC: 24 U/L (ref 10–50)
BASOPHILS # BLD: 0.03 K/UL (ref 0–0.2)
BASOPHILS NFR BLD: 0 % (ref 0–2)
BILIRUB SERPL-MCNC: 0.5 MG/DL (ref 0–1.2)
BUN SERPL-MCNC: 9 MG/DL (ref 6–20)
CALCIUM SERPL-MCNC: 9.4 MG/DL (ref 8.6–10.4)
CEA SERPL-MCNC: 191 NG/ML (ref 0–3.8)
CHLORIDE SERPL-SCNC: 99 MMOL/L (ref 98–107)
CO2 SERPL-SCNC: 24 MMOL/L (ref 20–31)
CREAT SERPL-MCNC: 0.9 MG/DL (ref 0.7–1.2)
EOSINOPHIL # BLD: 0.31 K/UL (ref 0–0.44)
EOSINOPHILS RELATIVE PERCENT: 4 % (ref 1–4)
ERYTHROCYTE [DISTWIDTH] IN BLOOD BY AUTOMATED COUNT: 17.6 % (ref 11.8–14.4)
GFR, ESTIMATED: >90 ML/MIN/1.73M2
GLUCOSE SERPL-MCNC: 103 MG/DL (ref 74–99)
HCT VFR BLD AUTO: 35.6 % (ref 40.7–50.3)
HGB BLD-MCNC: 11.7 G/DL (ref 13–17)
IMM GRANULOCYTES # BLD AUTO: 0.04 K/UL (ref 0–0.3)
IMM GRANULOCYTES NFR BLD: 1 %
LYMPHOCYTES NFR BLD: 1.62 K/UL (ref 1.1–3.7)
LYMPHOCYTES RELATIVE PERCENT: 23 % (ref 24–43)
MAGNESIUM SERPL-MCNC: 2 MG/DL (ref 1.6–2.6)
MCH RBC QN AUTO: 28.5 PG (ref 25.2–33.5)
MCHC RBC AUTO-ENTMCNC: 32.9 G/DL (ref 28.4–34.8)
MCV RBC AUTO: 86.8 FL (ref 82.6–102.9)
MONOCYTES NFR BLD: 1.29 K/UL (ref 0.1–1.2)
MONOCYTES NFR BLD: 18 % (ref 3–12)
NEUTROPHILS NFR BLD: 54 % (ref 36–65)
NEUTS SEG NFR BLD: 3.84 K/UL (ref 1.5–8.1)
NRBC BLD-RTO: 0 PER 100 WBC
PLATELET # BLD AUTO: 389 K/UL (ref 138–453)
PMV BLD AUTO: 8.4 FL (ref 8.1–13.5)
POTASSIUM SERPL-SCNC: 3.8 MMOL/L (ref 3.7–5.3)
PROT SERPL-MCNC: 7.7 G/DL (ref 6.6–8.7)
RBC # BLD AUTO: 4.1 M/UL (ref 4.21–5.77)
RBC # BLD: ABNORMAL 10*6/UL
SODIUM SERPL-SCNC: 138 MMOL/L (ref 136–145)
WBC OTHER # BLD: 7.1 K/UL (ref 3.5–11.3)

## 2025-04-22 PROCEDURE — 96416 CHEMO PROLONG INFUSE W/PUMP: CPT

## 2025-04-22 PROCEDURE — 96368 THER/DIAG CONCURRENT INF: CPT

## 2025-04-22 PROCEDURE — 96413 CHEMO IV INFUSION 1 HR: CPT

## 2025-04-22 PROCEDURE — 85025 COMPLETE CBC W/AUTO DIFF WBC: CPT

## 2025-04-22 PROCEDURE — 96411 CHEMO IV PUSH ADDL DRUG: CPT

## 2025-04-22 PROCEDURE — 96415 CHEMO IV INFUSION ADDL HR: CPT

## 2025-04-22 PROCEDURE — 2580000003 HC RX 258: Performed by: INTERNAL MEDICINE

## 2025-04-22 PROCEDURE — 6360000002 HC RX W HCPCS: Performed by: INTERNAL MEDICINE

## 2025-04-22 PROCEDURE — 82378 CARCINOEMBRYONIC ANTIGEN: CPT

## 2025-04-22 PROCEDURE — 96417 CHEMO IV INFUS EACH ADDL SEQ: CPT

## 2025-04-22 PROCEDURE — 96375 TX/PRO/DX INJ NEW DRUG ADDON: CPT

## 2025-04-22 PROCEDURE — 83735 ASSAY OF MAGNESIUM: CPT

## 2025-04-22 PROCEDURE — 80053 COMPREHEN METABOLIC PANEL: CPT

## 2025-04-22 RX ORDER — FLUOROURACIL 50 MG/ML
400 INJECTION, SOLUTION INTRAVENOUS ONCE
Status: COMPLETED | OUTPATIENT
Start: 2025-04-22 | End: 2025-04-22

## 2025-04-22 RX ORDER — HEPARIN SODIUM (PORCINE) LOCK FLUSH IV SOLN 100 UNIT/ML 100 UNIT/ML
500 SOLUTION INTRAVENOUS PRN
Status: CANCELLED | OUTPATIENT
Start: 2025-04-22

## 2025-04-22 RX ORDER — SODIUM CHLORIDE 9 MG/ML
5-250 INJECTION, SOLUTION INTRAVENOUS PRN
Status: DISCONTINUED | OUTPATIENT
Start: 2025-04-22 | End: 2025-04-23 | Stop reason: HOSPADM

## 2025-04-22 RX ORDER — SODIUM CHLORIDE 9 MG/ML
INJECTION, SOLUTION INTRAVENOUS CONTINUOUS
Status: CANCELLED | OUTPATIENT
Start: 2025-04-22

## 2025-04-22 RX ORDER — SODIUM CHLORIDE 0.9 % (FLUSH) 0.9 %
5-40 SYRINGE (ML) INJECTION PRN
Status: CANCELLED | OUTPATIENT
Start: 2025-04-22

## 2025-04-22 RX ORDER — ACETAMINOPHEN 325 MG/1
650 TABLET ORAL
Status: CANCELLED | OUTPATIENT
Start: 2025-04-22

## 2025-04-22 RX ORDER — SODIUM CHLORIDE 9 MG/ML
5-250 INJECTION, SOLUTION INTRAVENOUS PRN
Status: CANCELLED | OUTPATIENT
Start: 2025-04-22

## 2025-04-22 RX ORDER — PALONOSETRON 0.05 MG/ML
0.25 INJECTION, SOLUTION INTRAVENOUS ONCE
Status: COMPLETED | OUTPATIENT
Start: 2025-04-22 | End: 2025-04-22

## 2025-04-22 RX ORDER — ATROPINE SULFATE 0.4 MG/ML
0.4 INJECTION, SOLUTION INTRAVENOUS ONCE
Status: COMPLETED | OUTPATIENT
Start: 2025-04-22 | End: 2025-04-22

## 2025-04-22 RX ORDER — DEXAMETHASONE SODIUM PHOSPHATE 10 MG/ML
10 INJECTION, SOLUTION INTRA-ARTICULAR; INTRALESIONAL; INTRAMUSCULAR; INTRAVENOUS; SOFT TISSUE ONCE
Status: COMPLETED | OUTPATIENT
Start: 2025-04-22 | End: 2025-04-22

## 2025-04-22 RX ORDER — ALBUTEROL SULFATE 90 UG/1
4 INHALANT RESPIRATORY (INHALATION) PRN
Status: CANCELLED | OUTPATIENT
Start: 2025-04-22

## 2025-04-22 RX ORDER — DIPHENHYDRAMINE HYDROCHLORIDE 50 MG/ML
50 INJECTION, SOLUTION INTRAMUSCULAR; INTRAVENOUS
Status: CANCELLED | OUTPATIENT
Start: 2025-04-22

## 2025-04-22 RX ORDER — EPINEPHRINE 1 MG/ML
0.3 INJECTION, SOLUTION, CONCENTRATE INTRAVENOUS PRN
Status: CANCELLED | OUTPATIENT
Start: 2025-04-22

## 2025-04-22 RX ORDER — SODIUM CHLORIDE 9 MG/ML
5-250 INJECTION, SOLUTION INTRAVENOUS PRN
Status: CANCELLED | OUTPATIENT
Start: 2025-04-24

## 2025-04-22 RX ORDER — ONDANSETRON 2 MG/ML
8 INJECTION INTRAMUSCULAR; INTRAVENOUS
Status: CANCELLED | OUTPATIENT
Start: 2025-04-22

## 2025-04-22 RX ORDER — PALONOSETRON 0.05 MG/ML
0.25 INJECTION, SOLUTION INTRAVENOUS ONCE
Status: CANCELLED | OUTPATIENT
Start: 2025-04-22 | End: 2025-04-22

## 2025-04-22 RX ORDER — FLUOROURACIL 50 MG/ML
400 INJECTION, SOLUTION INTRAVENOUS ONCE
Status: CANCELLED | OUTPATIENT
Start: 2025-04-22 | End: 2025-04-22

## 2025-04-22 RX ORDER — FAMOTIDINE 10 MG/ML
20 INJECTION, SOLUTION INTRAVENOUS
Status: CANCELLED | OUTPATIENT
Start: 2025-04-22

## 2025-04-22 RX ORDER — HYDROCORTISONE SODIUM SUCCINATE 100 MG/2ML
100 INJECTION INTRAMUSCULAR; INTRAVENOUS
Status: CANCELLED | OUTPATIENT
Start: 2025-04-22

## 2025-04-22 RX ORDER — MEPERIDINE HYDROCHLORIDE 50 MG/ML
12.5 INJECTION INTRAMUSCULAR; INTRAVENOUS; SUBCUTANEOUS PRN
Status: CANCELLED | OUTPATIENT
Start: 2025-04-22

## 2025-04-22 RX ORDER — HEPARIN SODIUM (PORCINE) LOCK FLUSH IV SOLN 100 UNIT/ML 100 UNIT/ML
500 SOLUTION INTRAVENOUS PRN
Status: CANCELLED | OUTPATIENT
Start: 2025-04-24

## 2025-04-22 RX ORDER — SODIUM CHLORIDE 0.9 % (FLUSH) 0.9 %
5-40 SYRINGE (ML) INJECTION PRN
Status: CANCELLED | OUTPATIENT
Start: 2025-04-24

## 2025-04-22 RX ADMIN — DEXAMETHASONE SODIUM PHOSPHATE 10 MG: 10 INJECTION INTRAMUSCULAR; INTRAVENOUS at 11:24

## 2025-04-22 RX ADMIN — PANITUMUMAB 600 MG: 400 SOLUTION INTRAVENOUS at 11:58

## 2025-04-22 RX ADMIN — FLUOROURACIL 850 MG: 50 INJECTION, SOLUTION INTRAVENOUS at 15:07

## 2025-04-22 RX ADMIN — LEUCOVORIN CALCIUM 800 MG: 100 INJECTION, POWDER, LYOPHILIZED, FOR SUSPENSION INTRAMUSCULAR; INTRAVENOUS at 13:09

## 2025-04-22 RX ADMIN — SODIUM CHLORIDE 20 ML/HR: 0.9 INJECTION, SOLUTION INTRAVENOUS at 11:23

## 2025-04-22 RX ADMIN — IRINOTECAN HYDROCHLORIDE 380 MG: 20 INJECTION, SOLUTION INTRAVENOUS at 13:11

## 2025-04-22 RX ADMIN — ATROPINE SULFATE 0.4 MG: 0.4 INJECTION, SOLUTION INTRAVENOUS at 13:05

## 2025-04-22 RX ADMIN — FLUOROURACIL 5000 MG: 50 INJECTION, SOLUTION INTRAVENOUS at 15:08

## 2025-04-22 RX ADMIN — PALONOSETRON 0.25 MG: 0.05 INJECTION, SOLUTION INTRAVENOUS at 11:24

## 2025-04-22 ASSESSMENT — PAIN DESCRIPTION - LOCATION: LOCATION: BACK

## 2025-04-22 ASSESSMENT — PAIN SCALES - GENERAL: PAINLEVEL_OUTOF10: 6

## 2025-04-22 ASSESSMENT — PAIN DESCRIPTION - ORIENTATION: ORIENTATION: UPPER

## 2025-04-22 NOTE — PROGRESS NOTES
Patient arrived ambulatory per self for cycle 5 day 1 treatment.   Patient states his rash has stayed the same. Has upper back pain today. Denies other complaint or concern.  Port accessed; specimen sent.  Labs reviewed.   Plan needed to be signed, MD notified via [employer] of patient status. Plan signed.  Patient premedicated, line flushed.  Vectibix infused without issue, line flushed.  Atropine given, line flushed.   Irinotecan and leucovorin infused concurrently without issue, line flushed.  5FU push given with brisk blood return present throughout.  Patient connected to CADD pump, verified infusing prior to patient discharge and verified by second RN.  Patient is having stool urges, denies need for additional atropine, states this is his normal.  Patient discharged. Scheduled MD visit for tomorrow as patient has several questions related to treatment and goals of care.

## 2025-04-23 ENCOUNTER — OFFICE VISIT (OUTPATIENT)
Dept: ONCOLOGY | Age: 45
End: 2025-04-23
Payer: COMMERCIAL

## 2025-04-23 VITALS
TEMPERATURE: 97.6 F | WEIGHT: 215 LBS | DIASTOLIC BLOOD PRESSURE: 70 MMHG | HEART RATE: 56 BPM | RESPIRATION RATE: 18 BRPM | BODY MASS INDEX: 33.67 KG/M2 | SYSTOLIC BLOOD PRESSURE: 115 MMHG

## 2025-04-23 DIAGNOSIS — M79.604 PAIN OF RIGHT LOWER EXTREMITY: Primary | ICD-10-CM

## 2025-04-23 DIAGNOSIS — T45.1X5A CHEMOTHERAPY INDUCED DIARRHEA: ICD-10-CM

## 2025-04-23 DIAGNOSIS — T45.1X5D ADVERSE EFFECT OF CHEMOTHERAPY, SUBSEQUENT ENCOUNTER: ICD-10-CM

## 2025-04-23 DIAGNOSIS — C77.1 METASTASIS TO MEDIASTINAL LYMPH NODE (HCC): Primary | ICD-10-CM

## 2025-04-23 DIAGNOSIS — M79.604 RIGHT LEG PAIN: ICD-10-CM

## 2025-04-23 DIAGNOSIS — D64.9 NORMOCYTIC ANEMIA: ICD-10-CM

## 2025-04-23 DIAGNOSIS — K52.1 CHEMOTHERAPY INDUCED DIARRHEA: ICD-10-CM

## 2025-04-23 DIAGNOSIS — R59.0 LYMPHADENOPATHY, RETROPERITONEAL: ICD-10-CM

## 2025-04-23 DIAGNOSIS — C18.7 CANCER OF SIGMOID (HCC): ICD-10-CM

## 2025-04-23 PROCEDURE — 99215 OFFICE O/P EST HI 40 MIN: CPT | Performed by: INTERNAL MEDICINE

## 2025-04-23 PROCEDURE — 99211 OFF/OP EST MAY X REQ PHY/QHP: CPT | Performed by: INTERNAL MEDICINE

## 2025-04-23 NOTE — PROGRESS NOTES
Patient ID: Marcelo Barrios, 1980, [id number], 45 y.o.  Referred by :  No ref. provider found   Reason for consultation: Stage IV sigmoid cancer      Oncology problem  Stage IV sigmoid cancer  No MSI high, Tempus  K-rene wild-type, wild-type BRAF and NRAS  T p53 positive at 20%  Panitumumab induced skin toxicity        Oncology history  Systemic treatment form of FOLFOX Avastin started 10/29/2024 given every 2 weeks and changed to FOLFOX panitumumab(not able to tolerate)  January 31, 2025> CT abdomen pelvis did show evidence of progression(mediastinal and retroperitoneal lymph node)  February 18, 2025 started >panitumumab and FOLFIRI          HISTORY OF PRESENT ILLNESS:    Oncologic History:    Marcelo Barrios is a very pleasant 45 y.o. male.presented initially to [facility] on 8/7/2024 with chief complaints of of abdominal pain.  CT abdomen done at that time showed segmental circumferential wall thickening with the rib enhancing fluid collection measuring 5.8 cm x 2.2 x 3.2 cm concerning for colitis/proctitis.  Also had enlarged retroperitoneal lymph nodes measuring 2.2 x 1.9 cm.  GI evaluated the patient, patient underwent colonoscopy, he was found to have circumferential mucosal abnormality and the scope could not be passed above 20 cm from the anus.  Biopsies were obtained.  And he was discharged home on Levaquin and Flagyl.  His biopsy results came back positive for colonic adenocarcinoma with signet ring features.  Underwent flexible sigmoidoscopy, exploratory laparotomy, creation of end colostomy and resection of multiple peritoneal masses for unresectable colon cancer from the rectum to the proximal sigmoid colon on September 20, 2024  Baseline CEA 58.4;      URGICAL PATHOLOGY CONSULTATION      Patient Name: MARCELO BARRIOS   [facility] Rec: [id number]   Path Number: [id number]   Collected: 8/9/2024   Received: 8/9/2024   Reported: 8/15/2024 09:16     -- Diagnosis --   COLON, SITE NOT SPECIFIED,

## 2025-04-24 ENCOUNTER — HOSPITAL ENCOUNTER (OUTPATIENT)
Dept: INFUSION THERAPY | Facility: MEDICAL CENTER | Age: 45
Discharge: HOME | End: 2025-04-24
Payer: COMMERCIAL

## 2025-04-24 VITALS
RESPIRATION RATE: 20 BRPM | TEMPERATURE: 97.3 F | HEART RATE: 72 BPM | OXYGEN SATURATION: 96 % | DIASTOLIC BLOOD PRESSURE: 69 MMHG | SYSTOLIC BLOOD PRESSURE: 120 MMHG

## 2025-04-24 DIAGNOSIS — C18.7 CANCER OF SIGMOID (HCC): Primary | ICD-10-CM

## 2025-04-24 DIAGNOSIS — M79.604 PAIN OF RIGHT LOWER EXTREMITY: ICD-10-CM

## 2025-04-24 PROCEDURE — 6360000002 HC RX W HCPCS: Performed by: INTERNAL MEDICINE

## 2025-04-24 PROCEDURE — 2500000003 HC RX 250 WO HCPCS: Performed by: INTERNAL MEDICINE

## 2025-04-24 RX ORDER — SODIUM CHLORIDE 0.9 % (FLUSH) 0.9 %
5-40 SYRINGE (ML) INJECTION PRN
Status: DISCONTINUED | OUTPATIENT
Start: 2025-04-24 | End: 2025-04-25 | Stop reason: HOSPADM

## 2025-04-24 RX ORDER — HEPARIN 100 UNIT/ML
500 SYRINGE INTRAVENOUS PRN
Status: DISCONTINUED | OUTPATIENT
Start: 2025-04-24 | End: 2025-04-25 | Stop reason: HOSPADM

## 2025-04-24 RX ADMIN — SODIUM CHLORIDE, PRESERVATIVE FREE 20 ML: 5 INJECTION INTRAVENOUS at 14:52

## 2025-04-24 RX ADMIN — HEPARIN 500 UNITS: 100 SYRINGE at 14:53

## 2025-04-24 NOTE — PROGRESS NOTES
Patient arrive ambulatory for chemo pump removal.  Denies new complaint or concern. Patient continues to have indigestion, writer offered to ask MD for a script, patient denies need and will continue taking tums and drinking milk. Patient saw the doctor yesterday, doppler was ordered for right leg swelling. No appointment scheduled yet.  Vitals as charted.  Pump cartridge empty upon arrival to unit.  Pump removed; port flushed and heparinized with intact sprague needle removed per protocol.  Prachi (front office staff) scheduled doppler appointment for tomorrow at 2pm after order was placed as stat (ok per MD). Patient educated to go to ER with any chest pain/SOB/etc; he verbalizes understanding.  Patient ambulate off unit per self at discharge with order in hand.

## 2025-04-25 ENCOUNTER — TELEPHONE (OUTPATIENT)
Dept: INFUSION THERAPY | Facility: MEDICAL CENTER | Age: 45
End: 2025-04-25

## 2025-04-25 ENCOUNTER — HOSPITAL ENCOUNTER (EMERGENCY)
Age: 45
Discharge: HOME | End: 2025-04-25
Attending: EMERGENCY MEDICINE
Payer: COMMERCIAL

## 2025-04-25 ENCOUNTER — HOSPITAL ENCOUNTER (OUTPATIENT)
Dept: VASCULAR LAB | Age: 45
Discharge: HOME | End: 2025-04-27
Attending: INTERNAL MEDICINE
Payer: COMMERCIAL

## 2025-04-25 VITALS
SYSTOLIC BLOOD PRESSURE: 105 MMHG | TEMPERATURE: 98.2 F | DIASTOLIC BLOOD PRESSURE: 68 MMHG | HEIGHT: 65 IN | HEART RATE: 63 BPM | RESPIRATION RATE: 10 BRPM | OXYGEN SATURATION: 98 % | BODY MASS INDEX: 35.32 KG/M2 | WEIGHT: 212 LBS

## 2025-04-25 DIAGNOSIS — I82.4Y1 ACUTE DEEP VEIN THROMBOSIS (DVT) OF PROXIMAL VEIN OF RIGHT LOWER EXTREMITY (HCC): ICD-10-CM

## 2025-04-25 DIAGNOSIS — M79.604 PAIN OF RIGHT LOWER EXTREMITY: ICD-10-CM

## 2025-04-25 DIAGNOSIS — I82.4Y9 DEEP VEIN THROMBOSIS (DVT) OF PROXIMAL LOWER EXTREMITY, UNSPECIFIED CHRONICITY, UNSPECIFIED LATERALITY (HCC): Primary | ICD-10-CM

## 2025-04-25 LAB
ANION GAP SERPL CALCULATED.3IONS-SCNC: 12 MMOL/L (ref 9–16)
BASOPHILS # BLD: <0.03 K/UL (ref 0–0.2)
BASOPHILS NFR BLD: 0 % (ref 0–2)
BUN SERPL-MCNC: 10 MG/DL (ref 6–20)
CALCIUM SERPL-MCNC: 9.5 MG/DL (ref 8.6–10.4)
CHLORIDE SERPL-SCNC: 98 MMOL/L (ref 98–107)
CO2 SERPL-SCNC: 27 MMOL/L (ref 20–31)
CREAT SERPL-MCNC: 0.9 MG/DL (ref 0.7–1.2)
EOSINOPHIL # BLD: 0.34 K/UL (ref 0–0.44)
EOSINOPHILS RELATIVE PERCENT: 5 % (ref 1–4)
ERYTHROCYTE [DISTWIDTH] IN BLOOD BY AUTOMATED COUNT: 17.2 % (ref 11.8–14.4)
GFR, ESTIMATED: >90 ML/MIN/1.73M2
GLUCOSE SERPL-MCNC: 99 MG/DL (ref 74–99)
HCT VFR BLD AUTO: 39.8 % (ref 40.7–50.3)
HGB BLD-MCNC: 13.1 G/DL (ref 13–17)
IMM GRANULOCYTES # BLD AUTO: 0.01 K/UL (ref 0–0.3)
IMM GRANULOCYTES NFR BLD: 0 %
INR PPP: 1
LYMPHOCYTES NFR BLD: 1.79 K/UL (ref 1.1–3.7)
LYMPHOCYTES RELATIVE PERCENT: 27 % (ref 24–43)
MCH RBC QN AUTO: 28.6 PG (ref 25.2–33.5)
MCHC RBC AUTO-ENTMCNC: 32.9 G/DL (ref 28.4–34.8)
MCV RBC AUTO: 86.9 FL (ref 82.6–102.9)
MONOCYTES NFR BLD: 0.25 K/UL (ref 0.1–1.2)
MONOCYTES NFR BLD: 4 % (ref 3–12)
NEUTROPHILS NFR BLD: 64 % (ref 36–65)
NEUTS SEG NFR BLD: 4.2 K/UL (ref 1.5–8.1)
NRBC BLD-RTO: 0 PER 100 WBC
PARTIAL THROMBOPLASTIN TIME: 28.5 SEC (ref 23.9–33.8)
PLATELET # BLD AUTO: 428 K/UL (ref 138–453)
PMV BLD AUTO: 8.5 FL (ref 8.1–13.5)
POTASSIUM SERPL-SCNC: 4.1 MMOL/L (ref 3.7–5.3)
PROTHROMBIN TIME: 13.3 SEC (ref 11.5–14.2)
RBC # BLD AUTO: 4.58 M/UL (ref 4.21–5.77)
RBC # BLD: ABNORMAL 10*6/UL
SODIUM SERPL-SCNC: 136 MMOL/L (ref 136–145)
WBC OTHER # BLD: 6.6 K/UL (ref 3.5–11.3)

## 2025-04-25 PROCEDURE — 6370000000 HC RX 637 (ALT 250 FOR IP): Performed by: EMERGENCY MEDICINE

## 2025-04-25 PROCEDURE — 36415 COLL VENOUS BLD VENIPUNCTURE: CPT

## 2025-04-25 PROCEDURE — 93971 EXTREMITY STUDY: CPT

## 2025-04-25 PROCEDURE — 85610 PROTHROMBIN TIME: CPT

## 2025-04-25 PROCEDURE — 85025 COMPLETE CBC W/AUTO DIFF WBC: CPT

## 2025-04-25 PROCEDURE — 99283 EMERGENCY DEPT VISIT LOW MDM: CPT

## 2025-04-25 PROCEDURE — 80048 BASIC METABOLIC PNL TOTAL CA: CPT

## 2025-04-25 PROCEDURE — 85730 THROMBOPLASTIN TIME PARTIAL: CPT

## 2025-04-25 RX ADMIN — APIXABAN 10 MG: 5 TABLET, FILM COATED ORAL at 16:23

## 2025-04-25 ASSESSMENT — LIFESTYLE VARIABLES
HOW MANY STANDARD DRINKS CONTAINING ALCOHOL DO YOU HAVE ON A TYPICAL DAY: PATIENT DOES NOT DRINK
HOW OFTEN DO YOU HAVE A DRINK CONTAINING ALCOHOL: NEVER

## 2025-04-25 ASSESSMENT — PAIN - FUNCTIONAL ASSESSMENT: PAIN_FUNCTIONAL_ASSESSMENT: NONE - DENIES PAIN

## 2025-04-25 NOTE — TELEPHONE ENCOUNTER
MRN [id number] per vasc lab now, call received, \"extensive DVT right femoral to right posterior tibial vein\"  Pt directed to go to ER per vasc staff according to protocol based on finding, notfiied vasc lab that writer will notify md.

## 2025-04-25 NOTE — DISCHARGE INSTRUCTIONS
Take blood thinner as prescribed.  The vascular surgeon wants to see you at the office next week on Monday morning.  He wants you to arrive to the office around 8 AM.  It is important to follow-up with him.  If anything worsens prior to appointment you may return to the emergency room.

## 2025-04-25 NOTE — PROGRESS NOTES
Pt came in for STAT lower extremity venous right as outpatient. Pt appeared positive for extensive DVT from the right common femoral vein to the right posterior tibial veins. Writer called ordering physicians office. Spoke with Carolina. Pt not on blood thinners. Pt advised to be seen in ER. ER called and room made available. Writer brought pt to ER room via wheelchair.

## 2025-04-25 NOTE — ED PROVIDER NOTES
EMERGENCY DEPARTMENT ENCOUNTER    Pt Name: Yuri Phillips  MRN: [id number]  Birthdate 1980  Date of evaluation: 4/25/25  CHIEF COMPLAINT       Chief Complaint   Patient presents with    Leg Pain     Patient came from diagnostics, doppler showed significant DVT present in the right leg. Patient denies discomfort at this time. PMH Colon or prostate cancer pt is unsure exact diagnosis.      HISTORY OF PRESENT ILLNESS   45-year-old male presents emergency room for right lower leg swelling and outpatient ultrasound that showed extensive DVT.  Patient is currently undergoing treatment for metastatic colon cancer.  He has been having some intermittent cramping and swelling in the leg for a couple of weeks.  Ultrasound was just done today.             REVIEW OF SYSTEMS     Review of Systems   Cardiovascular:  Positive for leg swelling.     PASTMEDICAL HISTORY     Past Medical History:   Diagnosis Date    Colitis     Colon cancer (HCC)     GERD (gastroesophageal reflux disease)     Migraines     Seizure (HCC)     No seizures since 2016 (as of 2024) - not taking any medications    Under care of service provider 09/05/2024    no pcp at this time    Wears glasses      Past Problem List  Patient Active Problem List   Diagnosis Code    Colitis with abscess K52.9, K63.0    Nausea vomiting and diarrhea R11.2, R19.7    Intractable abdominal pain R10.9    Acute cystitis without hematuria N30.00    Proctitis K62.89    S/P left colectomy Z90.49    Cancer of sigmoid (HCC) C18.7    Lymphadenopathy, retroperitoneal R59.0    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

## 2025-04-26 PROCEDURE — 93971 EXTREMITY STUDY: CPT | Performed by: SURGERY

## 2025-04-30 ENCOUNTER — HOSPITAL ENCOUNTER (OUTPATIENT)
Dept: CT IMAGING | Age: 45
Discharge: HOME | End: 2025-05-02
Attending: INTERNAL MEDICINE
Payer: COMMERCIAL

## 2025-04-30 DIAGNOSIS — C77.1 METASTASIS TO MEDIASTINAL LYMPH NODE (HCC): ICD-10-CM

## 2025-04-30 PROCEDURE — 6360000004 HC RX CONTRAST MEDICATION: Performed by: INTERNAL MEDICINE

## 2025-04-30 PROCEDURE — 71260 CT THORAX DX C+: CPT

## 2025-04-30 PROCEDURE — 2500000003 HC RX 250 WO HCPCS: Performed by: INTERNAL MEDICINE

## 2025-04-30 RX ORDER — IOPAMIDOL 755 MG/ML
100 INJECTION, SOLUTION INTRAVASCULAR
Status: COMPLETED | OUTPATIENT
Start: 2025-04-30 | End: 2025-04-30

## 2025-04-30 RX ADMIN — IOPAMIDOL 100 ML: 755 INJECTION, SOLUTION INTRAVENOUS at 10:29

## 2025-04-30 RX ADMIN — BARIUM SULFATE 450 ML: 20 SUSPENSION ORAL at 10:36

## 2025-05-02 ENCOUNTER — HOSPITAL ENCOUNTER (OUTPATIENT)
Facility: MEDICAL CENTER | Age: 45
End: 2025-05-02
Payer: COMMERCIAL

## 2025-05-06 ENCOUNTER — HOSPITAL ENCOUNTER (OUTPATIENT)
Dept: INFUSION THERAPY | Facility: MEDICAL CENTER | Age: 45
Discharge: HOME | End: 2025-05-06
Payer: COMMERCIAL

## 2025-05-06 VITALS
HEART RATE: 79 BPM | BODY MASS INDEX: 35.4 KG/M2 | DIASTOLIC BLOOD PRESSURE: 77 MMHG | SYSTOLIC BLOOD PRESSURE: 107 MMHG | WEIGHT: 212.7 LBS | RESPIRATION RATE: 16 BRPM | OXYGEN SATURATION: 99 % | TEMPERATURE: 98.6 F

## 2025-05-06 DIAGNOSIS — C18.7 CANCER OF SIGMOID (HCC): Primary | ICD-10-CM

## 2025-05-06 DIAGNOSIS — C77.1 METASTASIS TO MEDIASTINAL LYMPH NODE (HCC): ICD-10-CM

## 2025-05-06 DIAGNOSIS — C23 GALLBLADDER CANCER (HCC): ICD-10-CM

## 2025-05-06 LAB
ALBUMIN SERPL-MCNC: 3.8 G/DL (ref 3.5–5.2)
ALBUMIN/GLOB SERPL: 1.1 {RATIO} (ref 1–2.5)
ALP SERPL-CCNC: 97 U/L (ref 40–129)
ALT SERPL-CCNC: 9 U/L (ref 10–50)
ANION GAP SERPL CALCULATED.3IONS-SCNC: 11 MMOL/L (ref 9–16)
AST SERPL-CCNC: 17 U/L (ref 10–50)
BASOPHILS # BLD: <0.03 K/UL (ref 0–0.2)
BASOPHILS NFR BLD: 0 % (ref 0–2)
BILIRUB SERPL-MCNC: 0.2 MG/DL (ref 0–1.2)
BUN SERPL-MCNC: 9 MG/DL (ref 6–20)
CALCIUM SERPL-MCNC: 9.3 MG/DL (ref 8.6–10.4)
CEA SERPL-MCNC: 283 NG/ML (ref 0–3.8)
CHLORIDE SERPL-SCNC: 101 MMOL/L (ref 98–107)
CO2 SERPL-SCNC: 26 MMOL/L (ref 20–31)
CREAT SERPL-MCNC: 1 MG/DL (ref 0.7–1.2)
EOSINOPHIL # BLD: 0.34 K/UL (ref 0–0.44)
EOSINOPHILS RELATIVE PERCENT: 7 % (ref 1–4)
ERYTHROCYTE [DISTWIDTH] IN BLOOD BY AUTOMATED COUNT: 17.3 % (ref 11.8–14.4)
GFR, ESTIMATED: >90 ML/MIN/1.73M2
GLUCOSE SERPL-MCNC: 110 MG/DL (ref 74–99)
HCT VFR BLD AUTO: 32.7 % (ref 40.7–50.3)
HGB BLD-MCNC: 10.9 G/DL (ref 13–17)
IMM GRANULOCYTES # BLD AUTO: 0 K/UL (ref 0–0.3)
IMM GRANULOCYTES NFR BLD: 0 %
LYMPHOCYTES NFR BLD: 1.23 K/UL (ref 1.1–3.7)
LYMPHOCYTES RELATIVE PERCENT: 24 % (ref 24–43)
MAGNESIUM SERPL-MCNC: 1.8 MG/DL (ref 1.6–2.6)
MCH RBC QN AUTO: 29 PG (ref 25.2–33.5)
MCHC RBC AUTO-ENTMCNC: 33.3 G/DL (ref 28.4–34.8)
MCV RBC AUTO: 87 FL (ref 82.6–102.9)
MONOCYTES NFR BLD: 0.51 K/UL (ref 0.1–1.2)
MONOCYTES NFR BLD: 10 % (ref 3–12)
NEUTROPHILS NFR BLD: 59 % (ref 36–65)
NEUTS SEG NFR BLD: 3.12 K/UL (ref 1.5–8.1)
NRBC BLD-RTO: 0 PER 100 WBC
PLATELET # BLD AUTO: 388 K/UL (ref 138–453)
PMV BLD AUTO: 8.3 FL (ref 8.1–13.5)
POTASSIUM SERPL-SCNC: 3.9 MMOL/L (ref 3.7–5.3)
PROT SERPL-MCNC: 7.3 G/DL (ref 6.6–8.7)
RBC # BLD AUTO: 3.76 M/UL (ref 4.21–5.77)
RBC # BLD: ABNORMAL 10*6/UL
SODIUM SERPL-SCNC: 138 MMOL/L (ref 136–145)
WBC OTHER # BLD: 5.2 K/UL (ref 3.5–11.3)

## 2025-05-06 PROCEDURE — 80053 COMPREHEN METABOLIC PANEL: CPT

## 2025-05-06 PROCEDURE — 36591 DRAW BLOOD OFF VENOUS DEVICE: CPT

## 2025-05-06 PROCEDURE — 82378 CARCINOEMBRYONIC ANTIGEN: CPT

## 2025-05-06 PROCEDURE — 6360000002 HC RX W HCPCS: Performed by: INTERNAL MEDICINE

## 2025-05-06 PROCEDURE — 83735 ASSAY OF MAGNESIUM: CPT

## 2025-05-06 PROCEDURE — 85025 COMPLETE CBC W/AUTO DIFF WBC: CPT

## 2025-05-06 PROCEDURE — 2500000003 HC RX 250 WO HCPCS: Performed by: INTERNAL MEDICINE

## 2025-05-06 RX ORDER — ONDANSETRON 2 MG/ML
8 INJECTION INTRAMUSCULAR; INTRAVENOUS
OUTPATIENT
Start: 2025-05-06

## 2025-05-06 RX ORDER — SODIUM CHLORIDE 9 MG/ML
25 INJECTION, SOLUTION INTRAVENOUS PRN
OUTPATIENT
Start: 2025-05-06

## 2025-05-06 RX ORDER — HEPARIN 100 UNIT/ML
500 SYRINGE INTRAVENOUS PRN
OUTPATIENT
Start: 2025-05-06

## 2025-05-06 RX ORDER — SODIUM CHLORIDE 0.9 % (FLUSH) 0.9 %
5-40 SYRINGE (ML) INJECTION PRN
OUTPATIENT
Start: 2025-05-06

## 2025-05-06 RX ORDER — SODIUM CHLORIDE 0.9 % (FLUSH) 0.9 %
5-40 SYRINGE (ML) INJECTION PRN
Status: DISCONTINUED | OUTPATIENT
Start: 2025-05-06 | End: 2025-05-07 | Stop reason: HOSPADM

## 2025-05-06 RX ORDER — HEPARIN 100 UNIT/ML
500 SYRINGE INTRAVENOUS PRN
Status: DISCONTINUED | OUTPATIENT
Start: 2025-05-06 | End: 2025-05-07 | Stop reason: HOSPADM

## 2025-05-06 RX ADMIN — HEPARIN 500 UNITS: 100 SYRINGE at 10:44

## 2025-05-06 RX ADMIN — SODIUM CHLORIDE, PRESERVATIVE FREE 10 ML: 5 INJECTION INTRAVENOUS at 10:44

## 2025-05-06 RX ADMIN — SODIUM CHLORIDE, PRESERVATIVE FREE 10 ML: 5 INJECTION INTRAVENOUS at 10:01

## 2025-05-06 ASSESSMENT — PAIN SCALES - GENERAL: PAINLEVEL_OUTOF10: 4

## 2025-05-06 ASSESSMENT — PAIN DESCRIPTION - LOCATION: LOCATION: BACK

## 2025-05-06 ASSESSMENT — PAIN DESCRIPTION - PAIN TYPE: TYPE: CHRONIC PAIN

## 2025-05-06 ASSESSMENT — PAIN DESCRIPTION - ORIENTATION: ORIENTATION: LOWER

## 2025-05-06 NOTE — PROGRESS NOTES
Patient arrives ambulatory for cycle 6 day 1 treatment.  Patient denies new concerns or complaints. States he has been taking eliquis for DVT. Per patient, leg swelling is decreasing.  Vitals as charted.  Port accessed; specimen sent.  Labs reviewed.  MD notified of patients DVT, new eliquis script, and CT scan results. Per MD, hold treatment today and keep MD visit on Thursday. Patient verbalizes understanding.  Port flushed and heparinized with intact sprague needle removed per protocol. Port site bleeding upon removal of needle, pressure held and pressure dressing applied.  Patient discharged. Writer instructed him to go to ER with any SOB or worsening issues, he verbalizes understanding. Returns Thursday for MD visit.

## 2025-05-08 ENCOUNTER — OFFICE VISIT (OUTPATIENT)
Age: 45
End: 2025-05-08
Payer: COMMERCIAL

## 2025-05-08 ENCOUNTER — TELEPHONE (OUTPATIENT)
Dept: INFUSION THERAPY | Facility: MEDICAL CENTER | Age: 45
End: 2025-05-08

## 2025-05-08 ENCOUNTER — TELEPHONE (OUTPATIENT)
Age: 45
End: 2025-05-08

## 2025-05-08 VITALS
DIASTOLIC BLOOD PRESSURE: 74 MMHG | HEART RATE: 84 BPM | TEMPERATURE: 98.7 F | BODY MASS INDEX: 36.24 KG/M2 | WEIGHT: 217.8 LBS | SYSTOLIC BLOOD PRESSURE: 106 MMHG | RESPIRATION RATE: 16 BRPM

## 2025-05-08 DIAGNOSIS — C18.7 CANCER OF SIGMOID (HCC): ICD-10-CM

## 2025-05-08 DIAGNOSIS — R59.0 LYMPHADENOPATHY, RETROPERITONEAL: ICD-10-CM

## 2025-05-08 DIAGNOSIS — C77.1 METASTASIS TO MEDIASTINAL LYMPH NODE (HCC): Primary | ICD-10-CM

## 2025-05-08 DIAGNOSIS — T45.1X5D ADVERSE EFFECT OF CHEMOTHERAPY, SUBSEQUENT ENCOUNTER: ICD-10-CM

## 2025-05-08 PROCEDURE — 99215 OFFICE O/P EST HI 40 MIN: CPT | Performed by: INTERNAL MEDICINE

## 2025-05-08 RX ORDER — ONDANSETRON 2 MG/ML
8 INJECTION INTRAMUSCULAR; INTRAVENOUS
OUTPATIENT
Start: 2025-05-29

## 2025-05-08 RX ORDER — HEPARIN SODIUM (PORCINE) LOCK FLUSH IV SOLN 100 UNIT/ML 100 UNIT/ML
500 SOLUTION INTRAVENOUS PRN
OUTPATIENT
Start: 2025-05-29

## 2025-05-08 RX ORDER — SODIUM CHLORIDE 9 MG/ML
5-250 INJECTION, SOLUTION INTRAVENOUS PRN
OUTPATIENT
Start: 2025-05-29

## 2025-05-08 RX ORDER — SODIUM CHLORIDE 9 MG/ML
INJECTION, SOLUTION INTRAVENOUS CONTINUOUS
OUTPATIENT
Start: 2025-05-15

## 2025-05-08 RX ORDER — ALBUTEROL SULFATE 90 UG/1
4 INHALANT RESPIRATORY (INHALATION) PRN
OUTPATIENT
Start: 2025-05-29

## 2025-05-08 RX ORDER — ALBUTEROL SULFATE 90 UG/1
4 INHALANT RESPIRATORY (INHALATION) PRN
OUTPATIENT
Start: 2025-05-15

## 2025-05-08 RX ORDER — FAMOTIDINE 10 MG/ML
20 INJECTION, SOLUTION INTRAVENOUS
OUTPATIENT
Start: 2025-05-29

## 2025-05-08 RX ORDER — EPINEPHRINE 1 MG/ML
0.3 INJECTION, SOLUTION, CONCENTRATE INTRAVENOUS PRN
OUTPATIENT
Start: 2025-05-15

## 2025-05-08 RX ORDER — ONDANSETRON 2 MG/ML
8 INJECTION INTRAMUSCULAR; INTRAVENOUS
OUTPATIENT
Start: 2025-05-15

## 2025-05-08 RX ORDER — FAMOTIDINE 10 MG/ML
20 INJECTION, SOLUTION INTRAVENOUS
OUTPATIENT
Start: 2025-05-15

## 2025-05-08 RX ORDER — ACETAMINOPHEN 325 MG/1
650 TABLET ORAL
OUTPATIENT
Start: 2025-05-29

## 2025-05-08 RX ORDER — HEPARIN SODIUM (PORCINE) LOCK FLUSH IV SOLN 100 UNIT/ML 100 UNIT/ML
500 SOLUTION INTRAVENOUS PRN
OUTPATIENT
Start: 2025-05-15

## 2025-05-08 RX ORDER — MEPERIDINE HYDROCHLORIDE 50 MG/ML
12.5 INJECTION INTRAMUSCULAR; INTRAVENOUS; SUBCUTANEOUS PRN
OUTPATIENT
Start: 2025-05-29

## 2025-05-08 RX ORDER — SODIUM CHLORIDE 0.9 % (FLUSH) 0.9 %
5-40 SYRINGE (ML) INJECTION PRN
OUTPATIENT
Start: 2025-05-15

## 2025-05-08 RX ORDER — DIPHENHYDRAMINE HYDROCHLORIDE 50 MG/ML
50 INJECTION, SOLUTION INTRAMUSCULAR; INTRAVENOUS
OUTPATIENT
Start: 2025-05-15

## 2025-05-08 RX ORDER — SODIUM CHLORIDE 9 MG/ML
5-250 INJECTION, SOLUTION INTRAVENOUS PRN
OUTPATIENT
Start: 2025-05-15

## 2025-05-08 RX ORDER — SODIUM CHLORIDE 0.9 % (FLUSH) 0.9 %
5-40 SYRINGE (ML) INJECTION PRN
OUTPATIENT
Start: 2025-05-29

## 2025-05-08 RX ORDER — HYDROCORTISONE SODIUM SUCCINATE 100 MG/2ML
100 INJECTION INTRAMUSCULAR; INTRAVENOUS
OUTPATIENT
Start: 2025-05-15

## 2025-05-08 RX ORDER — ACETAMINOPHEN 325 MG/1
650 TABLET ORAL
OUTPATIENT
Start: 2025-05-15

## 2025-05-08 RX ORDER — DIPHENHYDRAMINE HYDROCHLORIDE 50 MG/ML
50 INJECTION, SOLUTION INTRAMUSCULAR; INTRAVENOUS
OUTPATIENT
Start: 2025-05-29

## 2025-05-08 RX ORDER — EPINEPHRINE 1 MG/ML
0.3 INJECTION, SOLUTION, CONCENTRATE INTRAVENOUS PRN
OUTPATIENT
Start: 2025-05-29

## 2025-05-08 RX ORDER — HYDROCORTISONE SODIUM SUCCINATE 100 MG/2ML
100 INJECTION INTRAMUSCULAR; INTRAVENOUS
OUTPATIENT
Start: 2025-05-29

## 2025-05-08 RX ORDER — SODIUM CHLORIDE 9 MG/ML
INJECTION, SOLUTION INTRAVENOUS CONTINUOUS
OUTPATIENT
Start: 2025-05-29

## 2025-05-08 RX ORDER — MEPERIDINE HYDROCHLORIDE 50 MG/ML
12.5 INJECTION INTRAMUSCULAR; INTRAVENOUS; SUBCUTANEOUS PRN
OUTPATIENT
Start: 2025-05-15

## 2025-05-08 NOTE — TELEPHONE ENCOUNTER
MARCELO HERE FOR MD VISIT  Dc chemotherapy  Start new treatment asap  Rtc in 2 weeks to be seen at [facility]  NO INSTRUCTIONS GIVEN ABOUT A REFERRAL BUT A REFERRAL WAS FAXED TO [facility] TO [phone] AND FAXED CONFIRMATION SCANNED IN [employer]  NEW TX FOR ORAL WAS GIVEN TO MAGDY ROMAN MD VISIT 5/30/25 @ 9AM @ [facility]  AVS PRINTED W/ INSTRUCTIONS AND GIVEN TO PT ON EXIT

## 2025-05-08 NOTE — PROGRESS NOTES
patient's and had severe skin rash due to panitumumab and improved but patient reluctant to start back on panitumumab(even explained to him that the severity of toxicity correlate positively with response)  Follow-up imaging did show evidence of progression, long discussion with the patient and his sister over the phone to change treatment to FOLFIRI also recommend to rechallenge with panitumumab and will give hydrocortisone 1% cream's and doxycycline  Started on FOLFIRI panitumumab on February 18, 2025, status post fourth treatment and follow-up imaging on April 30 did show evidence of cancer progression  Long discussion with the patient's about third line treatment with Lonsurf and Avastin  Toxicity profile reviewed with the patient and willing to proceed  Also recommended referral to tertiary cancer center for possible clinical trial due to young age and excellent performance status      Extensive right lower extremity DVT and PE  -    Provoked by malignancy  -    Indefinite anticoagulation    RTC in 2 to 4 weeks              I spent a total of 50 minutes on the date of the service which included preparing to see the patient, face-to-face patient care, completing clinical documentation, obtaining and/or reviewing separately obtained history, performing a medically appropriate examination, counseling and educating the patient/family/caregiver, ordering medications, tests, or procedures, communicating with other HCPs (not separately reported), independently interpreting results (not separately reported), communicating results to the patient/family/caregiver and care coordination (not separately reported).                                  Osman Sanchez MD                          [facility]                            This note is created with the assistance of a speech recognition program.  While intending to generate a document that actually reflects the content of the visit, the document can still

## 2025-05-09 ENCOUNTER — TELEPHONE (OUTPATIENT)
Age: 45
End: 2025-05-09

## 2025-05-09 NOTE — TELEPHONE ENCOUNTER
Name: Yuri Phillips  : 1980  MRN: [id number]    Oncology Navigation Follow-Up Note    Contact Type:  Telephone    Notes: Writer called pt to follow up with him on change of his tx regimen. Pt is aware of new orders of oral chemo and injection every other week. Pt also aware of [facility] referral. Will continue to follow.      Electronically signed by Jovanna Carter RN on 2025 at 11:33 AM

## 2025-05-12 ENCOUNTER — TELEPHONE (OUTPATIENT)
Age: 45
End: 2025-05-12

## 2025-05-12 NOTE — TELEPHONE ENCOUNTER
Yuri Phillips was contacted to schedule an appointment three times with our clinical pharmacy team per referral and was not able to be reached.      The referral to pharmacist will be closed and the patient will be contacted again if a new referral is sent.    Thank you,  [facility] Team     Naa Momin  [facility]  Ambulatory Pharmacy   Clinical   [phone]

## 2025-05-13 ENCOUNTER — TELEPHONE (OUTPATIENT)
Dept: INFUSION THERAPY | Facility: MEDICAL CENTER | Age: 45
End: 2025-05-13

## 2025-05-13 DIAGNOSIS — C18.7 CANCER OF SIGMOID (HCC): ICD-10-CM

## 2025-05-13 DIAGNOSIS — C77.1 METASTASIS TO MEDIASTINAL LYMPH NODE (HCC): Primary | ICD-10-CM

## 2025-05-13 NOTE — TELEPHONE ENCOUNTER
New chemo order 5/8/25, from Dr Sanchez.    Zirabev  (bevacizumab-bvzr)     Ht and wt and bsa per epic agree as follows: .1 cm  Wt 98.8 kg  BSA 2.13    Zirabev dose is 5 mg/kg or 494 mg IV , rounded per epic to 500 mg,     Labs and urinalysis added to orders in epic.

## 2025-05-19 ENCOUNTER — TELEPHONE (OUTPATIENT)
Dept: INFUSION THERAPY | Facility: MEDICAL CENTER | Age: 45
End: 2025-05-19

## 2025-05-19 NOTE — TELEPHONE ENCOUNTER
I TRIED TO CALL MARCELO TO SCHEDULE HIS TX AND HAD TO LEAVE A MESSAGE TO CALL THE OFFICE TO SCHEDULE.

## 2025-05-20 ENCOUNTER — TELEPHONE (OUTPATIENT)
Age: 45
End: 2025-05-20

## 2025-05-20 NOTE — TELEPHONE ENCOUNTER
[facility] Medication Management [facility] Note  Called patient to attempt to discuss oral chemo: Lonsurf. Patient not yet scheduled for concomitant therapy with bevacizumab - per chart review [facility]  has left message for patient. Did not reach patient today, left message to return call.    Emily Blue, PharmD  [facility]  5/20/2025 9:24 AM

## 2025-05-21 ENCOUNTER — OFFICE VISIT (OUTPATIENT)
Dept: PRIMARY CARE CLINIC | Age: 45
End: 2025-05-21
Payer: COMMERCIAL

## 2025-05-21 VITALS
SYSTOLIC BLOOD PRESSURE: 120 MMHG | WEIGHT: 216.6 LBS | HEIGHT: 65 IN | DIASTOLIC BLOOD PRESSURE: 81 MMHG | HEART RATE: 77 BPM | BODY MASS INDEX: 36.09 KG/M2 | OXYGEN SATURATION: 99 %

## 2025-05-21 DIAGNOSIS — C18.7 CANCER OF SIGMOID (HCC): Primary | ICD-10-CM

## 2025-05-21 DIAGNOSIS — M54.50 CHRONIC BILATERAL LOW BACK PAIN WITHOUT SCIATICA: ICD-10-CM

## 2025-05-21 DIAGNOSIS — I82.491 ACUTE DEEP VEIN THROMBOSIS (DVT) OF OTHER SPECIFIED VEIN OF RIGHT LOWER EXTREMITY (HCC): ICD-10-CM

## 2025-05-21 DIAGNOSIS — G89.29 CHRONIC BILATERAL LOW BACK PAIN WITHOUT SCIATICA: ICD-10-CM

## 2025-05-21 PROCEDURE — 99204 OFFICE O/P NEW MOD 45 MIN: CPT | Performed by: NURSE PRACTITIONER
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
## 2025-05-22 NOTE — TELEPHONE ENCOUNTER
I CALLED AND TALKED TO MARCELO. HE STATES HIS CHEMO PILL IS COMING TODAY AND HE IS TO START TAKING IT RIGHT AWAY. I TOLD HIM THAT I WAS CALLING ABOUT SCHEDULING THE CHEMOTHERAPY AND NOT SURE ABOUT WHEN TO START THE CHEMO PILL. PER PT HE HAS NOT HAD A CHEMO EDUCATION FOR THE CHEMO PILL. I REACHED OUT TO HIS NAVIGATOR AND LEFT A VM ABOUT THE ABOVE SO HOPEFULLY SHE CAN HELP THE PATIENT ON WHEN TO START THE CHEMO & PILL. I ASKED THE NAVIGATOR TO CALL ME BACK WHEN SHE GETS MY  MESSAGE .

## 2025-05-23 ENCOUNTER — TELEPHONE (OUTPATIENT)
Age: 45
End: 2025-05-23

## 2025-05-23 ENCOUNTER — TELEPHONE (OUTPATIENT)
Dept: INFUSION THERAPY | Age: 45
End: 2025-05-23

## 2025-05-23 DIAGNOSIS — C18.7 CANCER OF SIGMOID (HCC): Primary | ICD-10-CM

## 2025-05-23 NOTE — TELEPHONE ENCOUNTER
**SPOKE WITH PT, PT AWARE OF APPT**     Electronically signed by Heidi Whitaker on 5/23/2025 at 9:28 AM

## 2025-05-23 NOTE — TELEPHONE ENCOUNTER
Pt requesting DME for adhesive remover wipes for ostomy bag. Order placed and faxed to [facility] at [phone] with confirmation.

## 2025-05-28 ASSESSMENT — ENCOUNTER SYMPTOMS
PHOTOPHOBIA: 0
BACK PAIN: 1
SINUS PAIN: 0
VOMITING: 0
DIARRHEA: 0
NAUSEA: 0
SORE THROAT: 0
SHORTNESS OF BREATH: 0
COLOR CHANGE: 0
COUGH: 0
SINUS PRESSURE: 0
ABDOMINAL PAIN: 0
CHEST TIGHTNESS: 0

## 2025-05-29 NOTE — TELEPHONE ENCOUNTER
PT HAS A MD VISIT ON 5-30-25 @  [facility]. I SAW PHARMACY TRIED TO REACH HIM TO DO EDUCATION FOR THE CHEMO PILL. I WILL SEE WHAT THE MD NOTE SAYS AND IF HE HAD EDUCATION, I WILL CALL HIM TO GET SCHEDULED.

## 2025-05-30 ENCOUNTER — OFFICE VISIT (OUTPATIENT)
Age: 45
End: 2025-05-30
Payer: COMMERCIAL

## 2025-05-30 ENCOUNTER — TELEPHONE (OUTPATIENT)
Age: 45
End: 2025-05-30

## 2025-05-30 VITALS
WEIGHT: 214.4 LBS | HEART RATE: 80 BPM | SYSTOLIC BLOOD PRESSURE: 123 MMHG | BODY MASS INDEX: 35.68 KG/M2 | DIASTOLIC BLOOD PRESSURE: 84 MMHG

## 2025-05-30 DIAGNOSIS — C77.1 METASTASIS TO MEDIASTINAL LYMPH NODE (HCC): Primary | ICD-10-CM

## 2025-05-30 DIAGNOSIS — T45.1X5A ANEMIA ASSOCIATED WITH CHEMOTHERAPY: ICD-10-CM

## 2025-05-30 DIAGNOSIS — D64.81 ANEMIA ASSOCIATED WITH CHEMOTHERAPY: ICD-10-CM

## 2025-05-30 DIAGNOSIS — D64.9 NORMOCYTIC ANEMIA: ICD-10-CM

## 2025-05-30 DIAGNOSIS — R59.0 LYMPHADENOPATHY, RETROPERITONEAL: ICD-10-CM

## 2025-05-30 DIAGNOSIS — C18.7 CANCER OF SIGMOID (HCC): ICD-10-CM

## 2025-05-30 PROCEDURE — 99214 OFFICE O/P EST MOD 30 MIN: CPT | Performed by: INTERNAL MEDICINE

## 2025-05-30 NOTE — TELEPHONE ENCOUNTER
AVS 05/30/25    Instructions   from Dr. Osman Sanchez MD    To verify the dose of Lonsurf  Please follow up on avastin and about the referral to [facility]  Rtc in 2-3 weeks    RV 06/20/25    Office spoke with Francoise at [facility] and pt is scheduled for tx. Nancy at [facility] said the referral was sent to [facility]  for the second opinion.    PT was given AVS and appt schedule    Electronically signed by Heidi Whitaker on 5/30/2025 at 11:47 AM

## 2025-05-30 NOTE — PATIENT INSTRUCTIONS
To verify the dose of Lonsurf  Please follow up on avastin and about the referral to [facility]  Rtc in 2-3 weeks

## 2025-05-30 NOTE — PROGRESS NOTES
Patient ID: Yuri Phillips, 1980, [id number], 45 y.o.  Referred by :  No ref. provider found   Reason for consultation: Stage IV sigmoid cancer      Oncology problem  Stage IV sigmoid cancer  No MSI high, Tempus  K-rene wild-type, wild-type BRAF and NRAS  T p53 positive at 20%  Panitumumab induced skin toxicity  April 25, 2025 extensive right lower leg DVT  April 30, 2025> incidental pulmonary embolism          Oncology treatment  Systemic treatment form of FOLFOX Avastin started 10/29/2024 given every 2 weeks and changed to FOLFOX panitumumab(not able to tolerate)  January 31, 2025> CT abdomen pelvis did show evidence of progression(mediastinal and retroperitoneal lymph node)  February 18, 2025 started >panitumumab and FOLFIRI  April 30, 2025> CT chest abdomen pelvis did not show evidence of disease progression with worsening mediastinal and abdominal pelvic lymphadenopathy  Tentatively Trifluridine + tipiracil ± bevacizumab           HISTORY OF PRESENT ILLNESS:    Oncologic History:    Yuri Phillips is a very pleasant 45 y.o. male.presented initially to [facility] on 8/7/2024 with chief complaints of of abdominal pain.  CT abdomen done at that time showed segmental circumferential wall thickening with the rib enhancing fluid collection measuring 5.8 cm x 2.2 x 3.2 cm concerning for colitis/proctitis.  Also had enlarged retroperitoneal lymph nodes measuring 2.2 x 1.9 cm.  GI evaluated the patient, patient underwent colonoscopy, he was found to have circumferential mucosal abnormality and the scope could not be passed above 20 cm from the anus.  Biopsies were obtained.  And he was discharged home on Levaquin and Flagyl.  His biopsy results came back positive for colonic adenocarcinoma with signet ring features.  Underwent flexible sigmoidoscopy, exploratory laparotomy, creation of end colostomy and resection of multiple peritoneal masses for unresectable colon cancer from the rectum to the

## 2025-06-04 ENCOUNTER — HOSPITAL ENCOUNTER (OUTPATIENT)
Facility: MEDICAL CENTER | Age: 45
End: 2025-06-04

## 2025-06-05 ENCOUNTER — TELEPHONE (OUTPATIENT)
Age: 45
End: 2025-06-05

## 2025-06-05 ENCOUNTER — TELEPHONE (OUTPATIENT)
Dept: INFUSION THERAPY | Facility: MEDICAL CENTER | Age: 45
End: 2025-06-05

## 2025-06-05 ENCOUNTER — HOSPITAL ENCOUNTER (OUTPATIENT)
Facility: MEDICAL CENTER | Age: 45
End: 2025-06-05

## 2025-06-05 NOTE — TELEPHONE ENCOUNTER
Name: Yuri Phillips  : 1980  MRN: [id number]    Oncology Navigation Follow-Up Note    Contact Type:  Telephone    Notes: Writer received a message via epic from MAGDY Colon asking for assistance to coordinate pt infusion as pt was a no show today and office is unable to reach pt. Writer called pt and pt states he was aware he missed as he didn't feel well stating he's been belching and vomited once. Pt states he took pepto bismol and it helped. Writer also notes that Emily, pharmacist, has been unable to reach pt for his oral chemo education. Writer spoke to Emily who states she can call him today between 2-3pm. Writer informed pt of call and states he will answer his phone today. Pt also confirms that he will be there on Monday for his infusion at 1115am. Writer also sent his appt info via text message per pt request. Will continue to follow.      Electronically signed by Jovanna Carter RN on 2025 at 11:26 AM

## 2025-06-05 NOTE — TELEPHONE ENCOUNTER
[facility] Medication Management Clinic Note  Called patient for oral chemo teaching re: Lonsurf. Multiple attempts have been made to reach patient. Left message again. Will not reach out to patient again unless we receive callback from patient.    Emily Blue, PharmD  [facility]  6/5/2025 10:27 AM

## 2025-06-05 NOTE — TELEPHONE ENCOUNTER
[facility] Medication Management Clinic Note  Received request from nurse navigator Jovanna to reach out to patient again today for oral chemo teaching, advised Jovanna I could call patient sometime between 2 and 3 pm today. Tried to call patient during agreed upon timeframe, left another message.    Emily Blue, PharmD  [facility]  6/5/2025 2:14 PM

## 2025-06-05 NOTE — TELEPHONE ENCOUNTER
WRITER CALLED AND LET A VM MESSAGE FOR PT TO CALL OUR OFFICE PT NO SHOWED ON 6/5/25 PT NEW APPT TO BE SCHEDULED FOR 6/9/25 * PER MAGDY CHARLES WAITING ON A CALL BACK TO SCHEDULE.

## 2025-06-05 NOTE — TELEPHONE ENCOUNTER
Call to patient regarding missed appt for treatment. Patient states stomach upset;maybe indigestion. Patient states he is having normal bowel movements by colostomy. Patient states he is taking his oral chemo;has not yet had his oral chemo education. patient transferred up to  to have infusion rescheduled    Writer also notified EARNESTINE Nugent to reach out to patient regarding any needs with coordinating appts

## 2025-06-06 ENCOUNTER — HOSPITAL ENCOUNTER (OUTPATIENT)
Age: 45
Setting detail: THERAPIES SERIES
Discharge: HOME | End: 2025-06-06
Payer: COMMERCIAL

## 2025-06-06 ENCOUNTER — INITIAL CONSULT (OUTPATIENT)
Dept: VASCULAR SURGERY | Age: 45
End: 2025-06-06
Payer: COMMERCIAL

## 2025-06-06 VITALS
WEIGHT: 211.4 LBS | BODY MASS INDEX: 35.22 KG/M2 | HEIGHT: 65 IN | SYSTOLIC BLOOD PRESSURE: 128 MMHG | HEART RATE: 88 BPM | OXYGEN SATURATION: 95 % | DIASTOLIC BLOOD PRESSURE: 90 MMHG | TEMPERATURE: 98.6 F

## 2025-06-06 DIAGNOSIS — Z86.718 HISTORY OF DVT OF LOWER EXTREMITY: Primary | ICD-10-CM

## 2025-06-06 DIAGNOSIS — I82.491 ACUTE DEEP VEIN THROMBOSIS (DVT) OF OTHER SPECIFIED VEIN OF RIGHT LOWER EXTREMITY (HCC): ICD-10-CM

## 2025-06-06 PROCEDURE — 99203 OFFICE O/P NEW LOW 30 MIN: CPT | Performed by: SURGERY

## 2025-06-06 PROCEDURE — 97161 PT EVAL LOW COMPLEX 20 MIN: CPT

## 2025-06-06 PROCEDURE — 97110 THERAPEUTIC EXERCISES: CPT

## 2025-06-06 NOTE — CONSULTS
x daily - 7 x weekly - 10 reps  - Supine Figure 4 Piriformis Stretch  - 2 x daily - 7 x weekly - 3 sets - 15 hold  - Hip Flexor Stretch at Edge of Bed  - 2 x daily - 7 x weekly - 3 sets - 15 hold  - Standing Sidebending with Chair Support  - 2 x daily - 7 x weekly - 1 sets - 10 reps  - Standing Marching  - 2 x daily - 7 x weekly - 2 sets - 10 reps    Treatment Plan:  [x] Therapeutic Exercise   48778  [] Iontophoresis: 4 mg/mL Dexamethasone Sodium Phosphate  mAmin  84405   [x] Therapeutic Activity  26920 [] Vasopneumatic cold with compression  99235    [x] Gait Training   45917 [] Ultrasound   75330   [x] Neuromuscular Re-education  00922 [] Electrical Stimulation Unattended  52672   [x] Manual Therapy  23393 [] Electrical Stimulation Attended  83821   [x] Instruction in HEP  [] Lumbar/Cervical Traction  90324   [] Aquatic Therapy   22045 [x] Cold/hotpack    [] Massage   63408      [] Dry Needling, 1 or 2 muscles  37209   [] Biofeedback, first 15 minutes   70441  [] Biofeedback, additional 15 minutes   39367 [] Dry Needling, 3 or more muscles  28442       Frequency:  2 x/week for 10 visits    Today’s Treatment:  Modalities:   Precautions: CA, Colostomy, history of DVT  Exercises:  Exercise Reps/ Time Weight/ Level Comments                                 Other: See education section for initial evaluation exercises.  Pt reports good relief with massage and would benefit from manual therapy. He is able to lay prone with pillows to keep pressure off of colostomy.  Specific Instructions for next treatment:  - general strengthening  - core and hip strengthening  - LE stretching and lumbar mobility.  -Manual therapy PRN    Evaluation Complexity:  History (Personal factors, comorbidities) [] 0 [x] 1-2 [] 3+   Exam (limitations, restrictions) [x] 1-2 [] 3 [] 4+   Clinical presentation (progression) [x] Stable [] Evolving  [] Unstable   Decision Making [x] Low [] Moderate [] High    [x] Low Complexity [] Moderate

## 2025-06-06 NOTE — PRE-CERTIFICATION NOTE
[x] [facility]  Outpatient Rehabilitation &  Therapy  [address]  [phone]  [phone] [] [facility]  Outpatient Rehabilitation &  Therapy  [address]  P: [phone]  F: [phone] [] [facility]  Outpatient Rehabilitation &  Therapy  [address]  P: [phone]  F: [phone] [] [facility]  Outpatient Rehabilitation &  Therapy  [address]  [phone]  [phone] [] [facility]  Outpatient Rehabilitation &  Therapy  [address]   P: [phone]  F: [phone]  [] [facility]  Outpatient Rehabilitation &  Therapy  [address]  P: [phone]  F: [phone] [] [facility]  Outpatient Rehabilitation &  Therapy  [address]  P: [phone]  F: [phone] [] [facility]  Outpatient Rehabilitation &  Therapy  [address]  P: [phone]  F: [phone] [] [facility]  Outpatient Rehabilitation &  Therapy  [address]  P: [phone]  F: [phone]  [] [facility] Outpatient Rehabilitation &  Therapy  [address]  P: [phone]  F: [phone]          Therapy Pre-certification Note      6/6/2025    Yuri Phillips  1980   [id number]      Insurance approval was received for Physical Therapy from [facility] on 6/6/25.  Approval was received for 10 visits (40 units) , from 6/6/25 to 7/25/25.  Authorization number [id number].      [x] Patient is scheduled.  [] Called patient to schedule and was able to schedule patient.  [] Called patient to schedule and message was left.  [] Called patient to schedule but was unable to reach patient.      Electronically signed by Naa Alba on 6/6/2025 at 2:36 PM

## 2025-06-06 NOTE — PROGRESS NOTES
[facility] PHYSICIANS [facility]  [address] [address] [address]  [place] [address]  Dept: [phone]     Patient: Yuri Phillips  : 1980  MRN: [id number]  DOS: 2025    Referring provider:  Deya Vu APRN - CNP         HPI:  Yuri Phillips is a 45 y.o. male who comes to the office for the first time regarding his right lower extremity deep vein thrombus.  He has stage IV colon cancer which I think is the most likely cause of his deep vein thrombus.  He is on anticoagulation with Eliquis 5 mg twice daily.  His colon was removed last year and he has been going with chemotherapy.  He has left inguinal lymph nodes which are robust.  He has not had pulmonary embolism.  He has not had significant lower extremity edema.  Currently he has a colostomy.  Past Medical History:   Diagnosis Date    Colitis     Colon cancer (HCC)     GERD (gastroesophageal reflux disease)     Migraines     Seizure (HCC)     No seizures since  (as of ) - not taking any medications    Under care of service provider 2024    no pcp at this time    Wears glasses      Family History   Problem Relation Age of Onset    Diabetes Mother     High Blood Pressure Father     Diabetes Sister     Cancer Neg Hx     Migraines Neg Hx     Seizures Neg Hx       Social History     Socioeconomic History    Marital status: Single     Spouse name: Not on file    Number of children: Not on file    Years of education: Not on file    Highest education level: Not on file   Occupational History    Not on file   Tobacco Use    Smoking status: Never     Passive exposure: Never    Smokeless tobacco: Never   Vaping Use    Vaping status: Never Used   Substance and Sexual Activity    Alcohol use: Not Currently    Drug use: Never    Sexual activity: Not on file   Other Topics Concern    Not on file   Social History Narrative    Not on file     Social Drivers of Health     Financial

## 2025-06-07 ENCOUNTER — HOSPITAL ENCOUNTER (OUTPATIENT)
Facility: MEDICAL CENTER | Age: 45
End: 2025-06-07
Payer: COMMERCIAL

## 2025-06-09 ENCOUNTER — HOSPITAL ENCOUNTER (OUTPATIENT)
Dept: INFUSION THERAPY | Facility: MEDICAL CENTER | Age: 45
Discharge: HOME | End: 2025-06-09
Payer: COMMERCIAL

## 2025-06-09 DIAGNOSIS — C77.1 METASTASIS TO MEDIASTINAL LYMPH NODE (HCC): ICD-10-CM

## 2025-06-09 DIAGNOSIS — C18.7 CANCER OF SIGMOID (HCC): Primary | ICD-10-CM

## 2025-06-09 LAB
ALBUMIN SERPL-MCNC: 4.2 G/DL (ref 3.5–5.2)
ALBUMIN/GLOB SERPL: 1.1 {RATIO} (ref 1–2.5)
ALP SERPL-CCNC: 129 U/L (ref 40–129)
ALT SERPL-CCNC: 9 U/L (ref 10–50)
ANION GAP SERPL CALCULATED.3IONS-SCNC: 14 MMOL/L (ref 9–16)
AST SERPL-CCNC: 27 U/L (ref 10–50)
BACTERIA URNS QL MICRO: ABNORMAL
BASOPHILS # BLD: <0.03 K/UL (ref 0–0.2)
BASOPHILS NFR BLD: 0 % (ref 0–2)
BILIRUB SERPL-MCNC: 0.6 MG/DL (ref 0–1.2)
BILIRUB UR QL STRIP: ABNORMAL
BUN SERPL-MCNC: 9 MG/DL (ref 6–20)
CALCIUM SERPL-MCNC: 10 MG/DL (ref 8.6–10.4)
CHLORIDE SERPL-SCNC: 99 MMOL/L (ref 98–107)
CLARITY UR: ABNORMAL
CO2 SERPL-SCNC: 26 MMOL/L (ref 20–31)
COLOR UR: YELLOW
CREAT SERPL-MCNC: 1 MG/DL (ref 0.7–1.2)
EOSINOPHIL # BLD: 0.25 K/UL (ref 0–0.44)
EOSINOPHILS RELATIVE PERCENT: 3 % (ref 1–4)
EPI CELLS #/AREA URNS HPF: ABNORMAL /HPF (ref 0–5)
ERYTHROCYTE [DISTWIDTH] IN BLOOD BY AUTOMATED COUNT: 18.3 % (ref 11.8–14.4)
GFR, ESTIMATED: >90 ML/MIN/1.73M2
GLUCOSE SERPL-MCNC: 99 MG/DL (ref 74–99)
GLUCOSE UR STRIP-MCNC: ABNORMAL MG/DL
HCT VFR BLD AUTO: 33.6 % (ref 40.7–50.3)
HGB BLD-MCNC: 11.1 G/DL (ref 13–17)
HGB UR QL STRIP.AUTO: ABNORMAL
IMM GRANULOCYTES # BLD AUTO: 0.04 K/UL (ref 0–0.3)
IMM GRANULOCYTES NFR BLD: 0 %
KETONES UR STRIP-MCNC: ABNORMAL MG/DL
LEUKOCYTE ESTERASE UR QL STRIP: ABNORMAL
LYMPHOCYTES NFR BLD: 1.13 K/UL (ref 1.1–3.7)
LYMPHOCYTES RELATIVE PERCENT: 12 % (ref 24–43)
MCH RBC QN AUTO: 28.1 PG (ref 25.2–33.5)
MCHC RBC AUTO-ENTMCNC: 33 G/DL (ref 28.4–34.8)
MCV RBC AUTO: 85.1 FL (ref 82.6–102.9)
MONOCYTES NFR BLD: 0.64 K/UL (ref 0.1–1.2)
MONOCYTES NFR BLD: 7 % (ref 3–12)
NEUTROPHILS NFR BLD: 78 % (ref 36–65)
NEUTS SEG NFR BLD: 7.5 K/UL (ref 1.5–8.1)
NITRITE UR QL STRIP: NEGATIVE
NRBC BLD-RTO: 0 PER 100 WBC
PH UR STRIP: 7.5 [PH] (ref 5–8)
PLATELET # BLD AUTO: 402 K/UL (ref 138–453)
PMV BLD AUTO: 8.1 FL (ref 8.1–13.5)
POTASSIUM SERPL-SCNC: 4 MMOL/L (ref 3.7–5.3)
PROT SERPL-MCNC: 7.9 G/DL (ref 6.6–8.7)
PROT UR STRIP-MCNC: ABNORMAL MG/DL
RBC # BLD AUTO: 3.95 M/UL (ref 4.21–5.77)
RBC # BLD: ABNORMAL 10*6/UL
RBC #/AREA URNS HPF: ABNORMAL /HPF (ref 0–2)
SODIUM SERPL-SCNC: 140 MMOL/L (ref 136–145)
SP GR UR STRIP: 1.01 (ref 1–1.03)
UROBILINOGEN UR STRIP-ACNC: ABNORMAL EU/DL (ref 0–1)
WBC #/AREA URNS HPF: ABNORMAL /HPF (ref 0–5)
WBC OTHER # BLD: 9.6 K/UL (ref 3.5–11.3)

## 2025-06-09 PROCEDURE — 81001 URINALYSIS AUTO W/SCOPE: CPT

## 2025-06-09 PROCEDURE — 85025 COMPLETE CBC W/AUTO DIFF WBC: CPT

## 2025-06-09 PROCEDURE — 80053 COMPREHEN METABOLIC PANEL: CPT

## 2025-06-09 PROCEDURE — 6360000002 HC RX W HCPCS: Performed by: INTERNAL MEDICINE

## 2025-06-09 PROCEDURE — 2500000003 HC RX 250 WO HCPCS: Performed by: INTERNAL MEDICINE

## 2025-06-09 PROCEDURE — 2580000003 HC RX 258: Performed by: INTERNAL MEDICINE

## 2025-06-09 PROCEDURE — 96413 CHEMO IV INFUSION 1 HR: CPT

## 2025-06-09 RX ORDER — SODIUM CHLORIDE 0.9 % (FLUSH) 0.9 %
5-40 SYRINGE (ML) INJECTION PRN
Status: DISCONTINUED | OUTPATIENT
Start: 2025-06-09 | End: 2025-06-10 | Stop reason: HOSPADM

## 2025-06-09 RX ORDER — SODIUM CHLORIDE 9 MG/ML
5-250 INJECTION, SOLUTION INTRAVENOUS PRN
Status: DISCONTINUED | OUTPATIENT
Start: 2025-06-09 | End: 2025-06-10 | Stop reason: HOSPADM

## 2025-06-09 RX ORDER — HEPARIN 100 UNIT/ML
500 SYRINGE INTRAVENOUS PRN
Status: DISCONTINUED | OUTPATIENT
Start: 2025-06-09 | End: 2025-06-10 | Stop reason: HOSPADM

## 2025-06-09 RX ADMIN — SODIUM CHLORIDE 500 MG: 9 INJECTION, SOLUTION INTRAVENOUS at 12:51

## 2025-06-09 RX ADMIN — SODIUM CHLORIDE 20 ML/HR: 0.9 INJECTION, SOLUTION INTRAVENOUS at 12:48

## 2025-06-09 RX ADMIN — SODIUM CHLORIDE, PRESERVATIVE FREE 10 ML: 5 INJECTION INTRAVENOUS at 13:46

## 2025-06-09 RX ADMIN — HEPARIN 500 UNITS: 100 SYRINGE at 13:46

## 2025-06-09 NOTE — PROGRESS NOTES
Patient arrived for cycle 1 day 1 treatment.  Patient denies complaints or concerns.   Patient denies wounds.  Port accessed;specimen sent.  Labs reviewed.   Pt educated , Chemo consent signed.   Zirabev infused with no sign adverse reaction;line flushed.  Port flushed and heparinized with intact sprague needle removed per protocol.  Patient discharge.

## 2025-06-11 ENCOUNTER — HOSPITAL ENCOUNTER (OUTPATIENT)
Age: 45
Setting detail: THERAPIES SERIES
Discharge: HOME | End: 2025-06-11
Payer: COMMERCIAL

## 2025-06-11 PROCEDURE — 97110 THERAPEUTIC EXERCISES: CPT

## 2025-06-12 ENCOUNTER — TELEPHONE (OUTPATIENT)
Age: 45
End: 2025-06-12

## 2025-06-12 ENCOUNTER — OFFICE VISIT (OUTPATIENT)
Age: 45
End: 2025-06-12
Payer: COMMERCIAL

## 2025-06-12 VITALS
HEART RATE: 78 BPM | BODY MASS INDEX: 34.11 KG/M2 | RESPIRATION RATE: 16 BRPM | DIASTOLIC BLOOD PRESSURE: 96 MMHG | WEIGHT: 205 LBS | SYSTOLIC BLOOD PRESSURE: 124 MMHG | TEMPERATURE: 97.8 F

## 2025-06-12 DIAGNOSIS — C77.1 METASTASIS TO MEDIASTINAL LYMPH NODE (HCC): Primary | ICD-10-CM

## 2025-06-12 DIAGNOSIS — T45.1X5A CHEMOTHERAPY INDUCED DIARRHEA: ICD-10-CM

## 2025-06-12 DIAGNOSIS — C18.7 CANCER OF SIGMOID (HCC): ICD-10-CM

## 2025-06-12 DIAGNOSIS — R19.7 NAUSEA VOMITING AND DIARRHEA: ICD-10-CM

## 2025-06-12 DIAGNOSIS — R11.2 NAUSEA VOMITING AND DIARRHEA: ICD-10-CM

## 2025-06-12 DIAGNOSIS — K52.1 CHEMOTHERAPY INDUCED DIARRHEA: ICD-10-CM

## 2025-06-12 PROCEDURE — 99215 OFFICE O/P EST HI 40 MIN: CPT | Performed by: INTERNAL MEDICINE

## 2025-06-12 RX ORDER — ONDANSETRON 4 MG/1
4 TABLET, FILM COATED ORAL EVERY 6 HOURS PRN
Qty: 30 TABLET | Refills: 3 | Status: SHIPPED | OUTPATIENT
Start: 2025-06-12 | End: 2025-07-12

## 2025-06-12 NOTE — TELEPHONE ENCOUNTER
MARCELO HERE FOR FOLLOW UP   RTC IN 2 WEEKS WITH LABS CAN BE SEEN IN [facility]  TO FOLLOW UP THE REFERRAL TO [facility] FOR SECOND OPINION  MD VISIT: OFFICE AT [facility] WILL CALL PT TO SCHEDULE   [facility]: REFERRAL FAXED & CONFIRMATION RECEIVED PER RYAN NAQVI RN NAVIGATOR CAN CHECK FURTHER TO SEE ABOUT AN APPOINTMENT.   AVS PRINTED AND GIVEN ON EXIT

## 2025-06-12 NOTE — TELEPHONE ENCOUNTER
Name: Yuri Phillips  : 1980  MRN: [id number]    Oncology Navigation Follow-Up Note    Contact Type:  Telephone    Notes: Writer called pt to f/u with him regarding his [facility] referral. Pt states  he hasn't heard from [facility]. Referral faxed one month ago and again yesterday. Writer provided pt with M Line number to [facility] and instructed him to call today. Pt verbalizes understanding. Will continue to follow.      Electronically signed by Jovanna Carter RN on 2025 at 10:53 AM

## 2025-06-12 NOTE — PROGRESS NOTES
and mild FDG uptake  consistent with postoperative healing and inflammation.  6. Mildly enlarged left inguinal lymph node with FDG uptake above background  is noted.  SUV maximum 2.73.    Interval history  During this visit patient's allergy, social, medical, surgical history and medications were reviewed and updated.    Less fatigue,more back pain and notice lump on the LLQ which is on my exam subcutaneous nodule  started the Lonsurf and Avastin  Leg swelling down  Tolerated treatment well but nausea vomiting diarrhea/abdominal pain        Past Medical History:   Diagnosis Date    Colitis     Colon cancer (HCC)     GERD (gastroesophageal reflux disease)     Migraines     Seizure (HCC)     No seizures since 2016 (as of 2024) - not taking any medications    Under care of service provider 09/05/2024    no pcp at this time    Wears glasses        Past Surgical History:   Procedure Laterality Date    ABDOMINAL EXPLORATION SURGERY N/A 09/20/2024    ABDOMINAL EXPLORATION, LOW ANTERIOR RESECTION, LYSIS OF ADHESIONS, MULTIPLE ABDOMINAL WALL BIOPSIES, BIOPSY OF RECTAL MASS, COLOSTOMY CREATION, BIOPSY OF UMBILICAL MASS    COLECTOMY N/A 9/20/2024    ABDOMINAL EXPLORATION, LOW ANTERIOR RESECTION, LYSIS OF ADHESIONS, MULTIPLE ABDOMINAL WALL BIOPSIES, BIOPSY OF RECTAL MASS, COLOSTOMY CREATION, BIOPSY OF UMBILICAL MASS performed by Keshia Villar MD at [facility] OR    COLONOSCOPY N/A 08/09/2024    COLONOSCOPY BIOPSY performed by Fabiano Alvarenga MD at [facility] OR    FLEXIBLE SIGMOIDOSCOPY N/A 09/20/2024    FLEXIBLE SIGMOIDOSCOPY    IR PORT PLACEMENT > 5 YEARS  10/22/2024    IR PORT PLACEMENT EQUAL OR GREATER THAN 5 YEARS 10/22/2024 Eliud Hooks MD [facility] SPECIAL PROCEDURES    PROCTOSIGMOIDOSCOPY N/A 9/20/2024    FLEXIBLE SIGMOIDOSCOPY -GI SCHEDULED performed by Keshia Villar MD at [facility] OR       No Known Allergies    Current Outpatient Medications   Medication Sig Dispense Refill    ondansetron (ZOFRAN) 4 MG tablet Take 1 tablet by

## 2025-06-12 NOTE — PATIENT INSTRUCTIONS
RTC IN 2 WEEKS WITH LABS CAN BE SEEN IN [facility]  TO FOLLOW UP THE REFERRAL TO [facility] FOR SECOND OPINION

## 2025-06-16 ENCOUNTER — HOSPITAL ENCOUNTER (OUTPATIENT)
Age: 45
Setting detail: THERAPIES SERIES
Discharge: HOME | End: 2025-06-16
Payer: COMMERCIAL

## 2025-06-16 PROCEDURE — 97110 THERAPEUTIC EXERCISES: CPT

## 2025-06-16 NOTE — FLOWSHEET NOTE
[x] [facility]  Outpatient Rehabilitation &  Therapy  [address]  [phone]  [phone] [] [facility]  Outpatient Rehabilitation &  Therapy  [address]  P: [phone]  F: [phone] [] [facility]  Outpatient Rehabilitation &  Therapy  [address]  P: [phone]  F: [phone] [] [facility]  Outpatient Rehabilitation &  Therapy  [address]  [phone]  [phone] [] [facility]  Outpatient Rehabilitation &  Therapy  [address]   P: [phone]  F: [phone]  [] [facility]  Outpatient Rehabilitation &  Therapy  [address]  P: [phone]  F: [phone] [] [facility]  Outpatient Rehabilitation &  Therapy  [address]  P: [phone]  F: [phone] [] [facility]  Outpatient Rehabilitation &  Therapy  [address]  P: [phone]  F: [phone] [] [facility]  Outpatient Rehabilitation &  Therapy  [address]  P: [phone]  F: [phone]  [] [facility] Outpatient Rehabilitation &  Therapy  [address]  P: [phone]  F: [phone]     Physical Therapy Daily Treatment Note    Date:  2025  Patient Name:  Yuri Phillips    :  1980  MRN: [id number]  Physician: Deya Vu APRN - SANTIAGO                                 [facility] after eval 20 visits per year hard max (secondary [facility])   Medical Diagnosis: M54.50, G89.29 (ICD-10-CM) - Chronic bilateral low back pain without sciatica                        Rehab Codes: M54.50, M62.81, R53.83, M25.551, M25.552  Onset Date: 2025 Referral                    Next 's appt.: 2025  Visit# / total visits: 3/10; Progress note for Medicare patient due at visit

## 2025-06-17 ENCOUNTER — TELEPHONE (OUTPATIENT)
Age: 45
End: 2025-06-17

## 2025-06-17 NOTE — TELEPHONE ENCOUNTER
WRITER CALLED AND LEFT A VM MESSAGE TO PUSH PT APPT OUT 1 WK EITHER ON 6/23/25 OR 6/26/25 PT PREFERENCE WAITING ON A CALL BACK

## 2025-06-19 ENCOUNTER — HOSPITAL ENCOUNTER (OUTPATIENT)
Dept: INFUSION THERAPY | Facility: MEDICAL CENTER | Age: 45
End: 2025-06-19

## 2025-06-20 ENCOUNTER — OFFICE VISIT (OUTPATIENT)
Age: 45
End: 2025-06-20
Payer: COMMERCIAL

## 2025-06-20 ENCOUNTER — TELEPHONE (OUTPATIENT)
Age: 45
End: 2025-06-20

## 2025-06-20 ENCOUNTER — HOSPITAL ENCOUNTER (OUTPATIENT)
Age: 45
Setting detail: THERAPIES SERIES
Discharge: HOME | End: 2025-06-20
Payer: COMMERCIAL

## 2025-06-20 VITALS
WEIGHT: 194.8 LBS | SYSTOLIC BLOOD PRESSURE: 122 MMHG | DIASTOLIC BLOOD PRESSURE: 89 MMHG | HEART RATE: 85 BPM | OXYGEN SATURATION: 99 % | HEIGHT: 65 IN | BODY MASS INDEX: 32.46 KG/M2

## 2025-06-20 DIAGNOSIS — C18.7 CANCER OF SIGMOID (HCC): ICD-10-CM

## 2025-06-20 DIAGNOSIS — C77.1 METASTASIS TO MEDIASTINAL LYMPH NODE (HCC): Primary | ICD-10-CM

## 2025-06-20 DIAGNOSIS — T45.1X5A ANEMIA ASSOCIATED WITH CHEMOTHERAPY: ICD-10-CM

## 2025-06-20 DIAGNOSIS — D64.81 ANEMIA ASSOCIATED WITH CHEMOTHERAPY: ICD-10-CM

## 2025-06-20 DIAGNOSIS — R59.0 LYMPHADENOPATHY, RETROPERITONEAL: ICD-10-CM

## 2025-06-20 DIAGNOSIS — T45.1X5D ADVERSE EFFECT OF CHEMOTHERAPY, SUBSEQUENT ENCOUNTER: ICD-10-CM

## 2025-06-20 PROCEDURE — 97140 MANUAL THERAPY 1/> REGIONS: CPT

## 2025-06-20 PROCEDURE — 99215 OFFICE O/P EST HI 40 MIN: CPT | Performed by: INTERNAL MEDICINE

## 2025-06-20 PROCEDURE — 97110 THERAPEUTIC EXERCISES: CPT

## 2025-06-20 RX ORDER — OMEPRAZOLE 40 MG/1
40 CAPSULE, DELAYED RELEASE ORAL
Qty: 90 CAPSULE | Refills: 1 | Status: SHIPPED | OUTPATIENT
Start: 2025-06-20

## 2025-06-20 NOTE — FLOWSHEET NOTE
[x] [facility]  Outpatient Rehabilitation &  Therapy  [address]  [phone]  [phone] [] [facility]  Outpatient Rehabilitation &  Therapy  [address]  P: [phone]  F: [phone] [] [facility]  Outpatient Rehabilitation &  Therapy  [address]  P: [phone]  F: [phone] [] [facility]  Outpatient Rehabilitation &  Therapy  [address]  [phone]  [phone] [] [facility]  Outpatient Rehabilitation &  Therapy  [address]   P: [phone]  F: [phone]  [] [facility]  Outpatient Rehabilitation &  Therapy  [address]  P: [phone]  F: [phone] [] [facility]  Outpatient Rehabilitation &  Therapy  [address]  P: [phone]  F: [phone] [] [facility]  Outpatient Rehabilitation &  Therapy  [address]  P: [phone]  F: [phone] [] [facility]  Outpatient Rehabilitation &  Therapy  [address]  P: [phone]  F: [phone]  [] [facility] Outpatient Rehabilitation &  Therapy  [address]  P: [phone]  F: [phone]     Physical Therapy Daily Treatment Note    Date:  2025  Patient Name:  Yuri Phillips    :  1980  MRN: [id number]  Physician: Deya Vu APRN - SANTIAGO                                 [facility] after eval 20 visits per year hard max (secondary [facility])   Medical Diagnosis: M54.50, G89.29 (ICD-10-CM) - Chronic bilateral low back pain without sciatica                        Rehab Codes: M54.50, M62.81, R53.83, M25.551, M25.552  Onset Date: 2025 Referral                    Next 's appt.: 2025  Visit# / total visits: 4/10; Progress note for Medicare patient due at visit

## 2025-06-20 NOTE — TELEPHONE ENCOUNTER
AVS 06/20/25    Instructions   from Dr. Osman Sanchez MD    Rtc in 4 weeks      Labs ordered today  CEA  Complete this on or around 6/20/2025.    RV- 07/18/25    An After Visit Summary was printed and given to the patient.    Electronically signed by Evelyne Hernandez MA on 6/20/2025 at 11:18 AM

## 2025-06-20 NOTE — PROGRESS NOTES
diameter similar to  January 2025. The collapsed rectosigmoid colon demonstrates heterogeneous  mural enhancement in the proximal half with quite pronounced surrounding fat  stranding and some trace of fluid compatible with the known malignancy. No  major change.    Peritoneum/Retroperitoneum: Bilateral external/internal iliac lymphadenopathy  more pronounced on the left.  Retroperitoneal left pelvic lymphadenopathy  worsened from prior.  For example left external iliac lymph node 1.7 cm short  axis was previously 1.2 cm, series 5, image 140. 3.7 x 2.1 cm left internal  iliac lymph node was previously 2.7 x 1.7 cm.  Conglomerate of large  periaortic lymph nodes again noted.  1.7 cm short axis left periaortic lymph  node previously 1.1 cm, series 5, image 85.  No ascites.    Bones/Soft Tissues: No acute abnormality of the bones.  The superficial soft  tissues show no acute process.  Impression: 1. Disease progression manifested mainly by worsening mediastinal and  abdominopelvic lymphadenopathy.  2. Incidental note of focus of chronic pulmonary embolism in the left  pulmonary artery.       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 Cancer of sigmoid (HCC)  CEA      3. Lymphadenopathy, retroperitoneal        4. Adverse effect of chemotherapy, subsequent encounter        5. Anemia associated with chemotherapy                                         PLAN:     I reviewed labs, imaging studies, related electronic medical records including outside records and discussed the diagnosis, prognosis and treatment recommendations   I reviewed the surgical pathology results, discuss goals of care and NCCN guidelines with patient and family    44-year-old man diagnosed with stage IV sigmoid cancer status post exploratory laparotomy, resection of multiple peritoneal masses, creation of end colostomy 9/20/24, discussed with the patient the natural history of sigmoid cancer in general

## 2025-06-23 ENCOUNTER — HOSPITAL ENCOUNTER (OUTPATIENT)
Age: 45
Setting detail: THERAPIES SERIES
Discharge: HOME | End: 2025-06-23
Payer: COMMERCIAL

## 2025-06-23 PROCEDURE — 97110 THERAPEUTIC EXERCISES: CPT

## 2025-06-23 NOTE — FLOWSHEET NOTE
[x] [facility]  Outpatient Rehabilitation &  Therapy  [address]  [phone]  [phone] [] [facility]  Outpatient Rehabilitation &  Therapy  [address]  P: [phone]  F: [phone] [] [facility]  Outpatient Rehabilitation &  Therapy  [address]  P: [phone]  F: [phone] [] [facility]  Outpatient Rehabilitation &  Therapy  [address]  [phone]  [phone] [] [facility]  Outpatient Rehabilitation &  Therapy  [address]   P: [phone]  F: [phone]  [] [facility]  Outpatient Rehabilitation &  Therapy  [address]  P: [phone]  F: [phone] [] [facility]  Outpatient Rehabilitation &  Therapy  [address]  P: [phone]  F: [phone] [] [facility]  Outpatient Rehabilitation &  Therapy  [address]  P: [phone]  F: [phone] [] [facility]  Outpatient Rehabilitation &  Therapy  [address]  P: [phone]  F: [phone]  [] [facility] Outpatient Rehabilitation &  Therapy  [address]  P: [phone]  F: [phone]     Physical Therapy Daily Treatment Note    Date:  2025  Patient Name:  Yuri Phillips    :  1980  MRN: [id number]  Physician: Deya Vu APRN - SANTIAGO                                 [facility] after eval 20 visits per year hard max (secondary [facility])   Medical Diagnosis: M54.50, G89.29 (ICD-10-CM) - Chronic bilateral low back pain without sciatica                        Rehab Codes: M54.50, M62.81, R53.83, M25.551, M25.552  Onset Date: 2025 Referral                    Next 's appt.: 2025  Visit# / total visits: 5/10; Progress note for Medicare patient due at visit

## 2025-06-30 ENCOUNTER — HOSPITAL ENCOUNTER (OUTPATIENT)
Age: 45
Setting detail: THERAPIES SERIES
Discharge: HOME | End: 2025-06-30
Payer: COMMERCIAL

## 2025-06-30 NOTE — FLOWSHEET NOTE
[x] [facility]  Outpatient Rehabilitation &  Therapy  [address]  [phone]  [phone] [] [facility]  Outpatient Rehabilitation &  Therapy  [address]  P: [phone]  F: [phone] [] [facility]  Outpatient Rehabilitation &  Therapy  [address]  P: [phone]  F: [phone] [] [facility]  Outpatient Rehabilitation &  Therapy  [address]  [phone]  [phone] [] [facility]  Outpatient Rehabilitation &  Therapy  [address]   P: [phone]  F: [phone]  [] [facility]  Outpatient Rehabilitation &  Therapy  [address]  P: [phone]  F: [phone] [] [facility]  Outpatient Rehabilitation &  Therapy  [address]  P: [phone]  F: [phone] [] [facility]  Outpatient Rehabilitation &  Therapy  [address]  P: [phone]  F: [phone] [] [facility]  Outpatient Rehabilitation &  Therapy  [address]  P: [phone]  F: [phone]  [] [facility] Outpatient Rehabilitation &  Therapy  [address]  P: [phone]  F: [phone] [] [facility] Outpatient Rehabilitation &  Therapy  [address]  P: [phone]   F: [phone]      Therapy Cancel/No Show note    Date: 2025  Patient: Yuri Phillips  : 1980  MRN: [id number]    Cancels/No Shows to date: 0    For today's appointment patient:    [x]  Cancelled    [] Rescheduled appointment    [] No-show     Reason given by patient:    [x]  Patient ill    []  Conflicting appointment    [] No transportation      [] Conflict with work    [] No reason given    [] Weather related    [] COVID-19    [] Other:      Comments:        [x] Next appointment was

## 2025-07-01 ENCOUNTER — HOSPITAL ENCOUNTER (OUTPATIENT)
Facility: MEDICAL CENTER | Age: 45
End: 2025-07-01

## 2025-07-02 ENCOUNTER — HOSPITAL ENCOUNTER (OUTPATIENT)
Age: 45
Setting detail: THERAPIES SERIES
Discharge: HOME | End: 2025-07-02
Payer: COMMERCIAL

## 2025-07-02 PROCEDURE — 97110 THERAPEUTIC EXERCISES: CPT

## 2025-07-02 NOTE — FLOWSHEET NOTE
[x] [facility]  Outpatient Rehabilitation &  Therapy  [address]  [phone]  [phone] [] [facility]  Outpatient Rehabilitation &  Therapy  [address]  P: [phone]  F: [phone] [] [facility]  Outpatient Rehabilitation &  Therapy  [address]  P: [phone]  F: [phone] [] [facility]  Outpatient Rehabilitation &  Therapy  [address]  [phone]  [phone] [] [facility]  Outpatient Rehabilitation &  Therapy  [address]   P: [phone]  F: [phone]  [] [facility]  Outpatient Rehabilitation &  Therapy  [address]  P: [phone]  F: [phone] [] [facility]  Outpatient Rehabilitation &  Therapy  [address]  P: [phone]  F: [phone] [] [facility]  Outpatient Rehabilitation &  Therapy  [address]  P: [phone]  F: [phone] [] [facility]  Outpatient Rehabilitation &  Therapy  [address]  P: [phone]  F: [phone]  [] [facility] Outpatient Rehabilitation &  Therapy  [address]  P: [phone]  F: [phone]     Physical Therapy Daily Treatment Note    Date:  2025  Patient Name:  Yuri Phillips    :  1980  MRN: [id number]  Physician: Deya Vu APRN - [place]                                 [facility] after eval 20 visits per year hard max (secondary [facility])   Medical Diagnosis: M54.50, G89.29 (ICD-10-CM) - Chronic bilateral low back pain without sciatica                        Rehab Codes: M54.50, M62.81, R53.83, M25.551, M25.552  Onset Date: 2025 Referral                    Next 's appt.: 2025  Visit# / total visits: 6/10; Progress note for Medicare patient due at visit

## 2025-07-03 ENCOUNTER — HOSPITAL ENCOUNTER (OUTPATIENT)
Dept: INFUSION THERAPY | Facility: MEDICAL CENTER | Age: 45
Discharge: HOME | End: 2025-07-03

## 2025-07-03 NOTE — PROGRESS NOTES
Writer called patient regarding missed appt for immunotherapy infusion scheduled today. Patient states he forgot and had a recent death in his family;patient was transferred up front to reschedule

## 2025-07-09 ENCOUNTER — TELEPHONE (OUTPATIENT)
Age: 45
End: 2025-07-09

## 2025-07-09 ENCOUNTER — HOSPITAL ENCOUNTER (OUTPATIENT)
Age: 45
Setting detail: THERAPIES SERIES
Discharge: HOME | End: 2025-07-09
Payer: COMMERCIAL

## 2025-07-09 PROCEDURE — 97110 THERAPEUTIC EXERCISES: CPT

## 2025-07-09 NOTE — TELEPHONE ENCOUNTER
Name: Yuri Phillips  : 1980  MRN: [id number]    Oncology Navigation Follow-Up Note    Contact Type:  Telephone    Notes: Writer called pt to f/u with him regarding referral to [facility]. No answer, detailed VM left regarding above and requesting a return call.       Electronically signed by Jovanna Carter RN on 2025 at 2:07 PM

## 2025-07-09 NOTE — FLOWSHEET NOTE
[x] [facility]  Outpatient Rehabilitation &  Therapy  [address]  [phone]  [phone] [] [facility]  Outpatient Rehabilitation &  Therapy  [address]  P: [phone]  F: [phone] [] [facility]  Outpatient Rehabilitation &  Therapy  [address]  P: [phone]  F: [phone] [] [facility]  Outpatient Rehabilitation &  Therapy  [address]  [phone]  [phone] [] [facility]  Outpatient Rehabilitation &  Therapy  [address]   P: [phone]  F: [phone]  [] [facility]  Outpatient Rehabilitation &  Therapy  [address]  P: [phone]  F: [phone] [] [facility]  Outpatient Rehabilitation &  Therapy  [address]  P: [phone]  F: [phone] [] [facility]  Outpatient Rehabilitation &  Therapy  [address]  P: [phone]  F: [phone] [] [facility]  Outpatient Rehabilitation &  Therapy  [address]  P: [phone]  F: [phone]  [] [facility] Outpatient Rehabilitation &  Therapy  [address]  P: [phone]  F: [phone]     Physical Therapy Daily Treatment Note    Date:  2025  Patient Name:  Yuri Phillips    :  1980  MRN: [id number]  Physician: Deya Vu APRN - [place]                                 [facility] after eval 20 visits per year hard max (secondary [facility])   Medical Diagnosis: M54.50, G89.29 (ICD-10-CM) - Chronic bilateral low back pain without sciatica                        Rehab Codes: M54.50, M62.81, R53.83, M25.551, M25.552  Onset Date: 2025 Referral                    Next 's appt.: 2025  Visit# / total visits: 7/10; Progress note for Medicare patient due at visit

## 2025-07-10 ENCOUNTER — TELEPHONE (OUTPATIENT)
Age: 45
End: 2025-07-10

## 2025-07-10 NOTE — TELEPHONE ENCOUNTER
**SPOKE WITH PT, PT REMINDED OF APPT**     Electronically signed by Heidi Whitaker on 7/10/2025 at 9:13 AM

## 2025-07-14 ENCOUNTER — HOSPITAL ENCOUNTER (OUTPATIENT)
Age: 45
Setting detail: THERAPIES SERIES
End: 2025-07-14
Payer: COMMERCIAL

## 2025-07-17 ENCOUNTER — TELEPHONE (OUTPATIENT)
Dept: INFUSION THERAPY | Facility: MEDICAL CENTER | Age: 45
End: 2025-07-17

## 2025-07-18 ENCOUNTER — OFFICE VISIT (OUTPATIENT)
Age: 45
End: 2025-07-18

## 2025-07-18 ENCOUNTER — TELEPHONE (OUTPATIENT)
Age: 45
End: 2025-07-18

## 2025-07-18 ENCOUNTER — HOSPITAL ENCOUNTER (OUTPATIENT)
Age: 45
Discharge: HOME | End: 2025-07-18
Payer: COMMERCIAL

## 2025-07-18 VITALS
HEART RATE: 90 BPM | WEIGHT: 179.2 LBS | SYSTOLIC BLOOD PRESSURE: 120 MMHG | OXYGEN SATURATION: 90 % | BODY MASS INDEX: 29.82 KG/M2 | TEMPERATURE: 98.5 F | DIASTOLIC BLOOD PRESSURE: 86 MMHG

## 2025-07-18 DIAGNOSIS — C18.7 CANCER OF SIGMOID (HCC): ICD-10-CM

## 2025-07-18 DIAGNOSIS — C18.7 CANCER OF SIGMOID (HCC): Primary | ICD-10-CM

## 2025-07-18 DIAGNOSIS — C77.1 METASTASIS TO MEDIASTINAL LYMPH NODE (HCC): ICD-10-CM

## 2025-07-18 DIAGNOSIS — T45.1X5D ADVERSE EFFECT OF CHEMOTHERAPY, SUBSEQUENT ENCOUNTER: ICD-10-CM

## 2025-07-18 DIAGNOSIS — T45.1X5A ANEMIA ASSOCIATED WITH CHEMOTHERAPY: ICD-10-CM

## 2025-07-18 DIAGNOSIS — I82.401 DEEP VENOUS EMBOLISM AND THROMBOSIS OF RIGHT LOWER EXTREMITY (HCC): ICD-10-CM

## 2025-07-18 DIAGNOSIS — D64.81 ANEMIA ASSOCIATED WITH CHEMOTHERAPY: ICD-10-CM

## 2025-07-18 LAB
ALBUMIN SERPL-MCNC: 4.3 G/DL (ref 3.5–5.2)
ALP SERPL-CCNC: 186 U/L (ref 40–129)
ALT SERPL-CCNC: 14 U/L (ref 10–50)
ANION GAP SERPL CALCULATED.3IONS-SCNC: 17 MMOL/L (ref 9–16)
AST SERPL-CCNC: 28 U/L (ref 10–50)
BASOPHILS # BLD: 0 K/UL (ref 0–0.2)
BASOPHILS NFR BLD: 0 % (ref 0–2)
BILIRUB SERPL-MCNC: 0.6 MG/DL (ref 0–1.2)
BUN SERPL-MCNC: 9 MG/DL (ref 6–20)
CALCIUM SERPL-MCNC: 10 MG/DL (ref 8.6–10.4)
CEA SERPL-MCNC: 1387 NG/ML (ref 0–3.8)
CHLORIDE SERPL-SCNC: 95 MMOL/L (ref 98–107)
CO2 SERPL-SCNC: 24 MMOL/L (ref 20–31)
CREAT SERPL-MCNC: 0.9 MG/DL (ref 0.7–1.2)
EOSINOPHIL # BLD: 0.13 K/UL (ref 0–0.44)
EOSINOPHILS RELATIVE PERCENT: 2 % (ref 0–4)
ERYTHROCYTE [DISTWIDTH] IN BLOOD BY AUTOMATED COUNT: 22.3 % (ref 11.5–14.9)
GFR, ESTIMATED: >90 ML/MIN/1.73M2
GLUCOSE SERPL-MCNC: 107 MG/DL (ref 74–99)
HCT VFR BLD AUTO: 36.5 % (ref 41–53)
HGB BLD-MCNC: 11.9 G/DL (ref 13.5–17.5)
IMM GRANULOCYTES # BLD AUTO: 0 K/UL (ref 0–0.3)
IMM GRANULOCYTES NFR BLD: 0 %
LYMPHOCYTES NFR BLD: 1.07 K/UL (ref 1.1–3.7)
LYMPHOCYTES RELATIVE PERCENT: 16 % (ref 24–44)
MCH RBC QN AUTO: 27.7 PG (ref 26–34)
MCHC RBC AUTO-ENTMCNC: 32.6 G/DL (ref 31–37)
MCV RBC AUTO: 84.9 FL (ref 80–100)
MONOCYTES NFR BLD: 0.74 K/UL (ref 0.1–1.2)
MONOCYTES NFR BLD: 11 % (ref 3–12)
MORPHOLOGY: ABNORMAL
NEUTROPHILS NFR BLD: 71 % (ref 36–66)
NEUTS SEG NFR BLD: 4.76 K/UL (ref 1.5–8.1)
NRBC BLD-RTO: 0 PER 100 WBC
PLATELET # BLD AUTO: 390 K/UL (ref 150–450)
PMV BLD AUTO: 8.8 FL (ref 8–13.5)
POTASSIUM SERPL-SCNC: 3.8 MMOL/L (ref 3.7–5.3)
PROT SERPL-MCNC: 8.2 G/DL (ref 6.6–8.7)
RBC # BLD AUTO: 4.3 M/UL (ref 4.21–5.77)
SODIUM SERPL-SCNC: 136 MMOL/L (ref 136–145)
WBC OTHER # BLD: 6.7 K/UL (ref 3.5–11)

## 2025-07-18 PROCEDURE — 80053 COMPREHEN METABOLIC PANEL: CPT

## 2025-07-18 PROCEDURE — 36415 COLL VENOUS BLD VENIPUNCTURE: CPT

## 2025-07-18 PROCEDURE — 85025 COMPLETE CBC W/AUTO DIFF WBC: CPT

## 2025-07-18 PROCEDURE — 82378 CARCINOEMBRYONIC ANTIGEN: CPT

## 2025-07-18 RX ORDER — BISACODYL 10 MG/1
SUPPOSITORY RECTAL
Qty: 500 SUPPOSITORY | Refills: 1 | OUTPATIENT
Start: 2025-07-18

## 2025-07-18 NOTE — PROGRESS NOTES
Patient ID: Yuri Phillips, 1980, [id number], 45 y.o.  Referred by :  No ref. provider found   Reason for consultation: Stage IV sigmoid cancer      Oncology problem  Stage IV sigmoid cancer  No MSI high, Tempus  K-rene wild-type, wild-type BRAF and NRAS   T p53 positive at 20%  Panitumumab induced skin toxicity  April 25, 2025 extensive right lower leg DVT  April 30, 2025> incidental pulmonary embolism          Oncology treatment  Systemic treatment form of FOLFOX Avastin started 10/29/2024 given every 2 weeks and changed to FOLFOX panitumumab(not able to tolerate)  January 31, 2025> CT abdomen pelvis did show evidence of progression(mediastinal and retroperitoneal lymph node)  February 18, 2025 started >panitumumab and FOLFIRI  April 30, 2025> CT chest abdomen pelvis did not show evidence of disease progression with worsening mediastinal and abdominal pelvic lymphadenopathy  May 2025 >Trifluridine + tipiracil ± bevacizumab (Avastin did not restart 2 July)          HISTORY OF PRESENT ILLNESS:    Oncologic History:    Yuri Phillips is a very pleasant 45 y.o. male.presented initially to [facility] on 8/7/2024 with chief complaints of of abdominal pain.  CT abdomen done at that time showed segmental circumferential wall thickening with the rib enhancing fluid collection measuring 5.8 cm x 2.2 x 3.2 cm concerning for colitis/proctitis.  Also had enlarged retroperitoneal lymph nodes measuring 2.2 x 1.9 cm.  GI evaluated the patient, patient underwent colonoscopy, he was found to have circumferential mucosal abnormality and the scope could not be passed above 20 cm from the anus.  Biopsies were obtained.  And he was discharged home on Levaquin and Flagyl.  His biopsy results came back positive for colonic adenocarcinoma with signet ring features.  Underwent flexible sigmoidoscopy, exploratory laparotomy, creation of end colostomy and resection of multiple peritoneal masses for unresectable colon

## 2025-07-18 NOTE — TELEPHONE ENCOUNTER
[id number]    Instructions   from Dr. Osman Sanchez MD    Rtc in 2 weeks at [facility] 7/31 and contact [facility] to resume avastin asap  Labs today    Today's medication changes   START taking:  Magic Mouthwash (Miracle Mouthwash)   Accurate as of July 18, 2025  9:54 AM.  Review your updated medication list below.     these medications at [id number] - [place], [place] - [address] - P [phone] - F [phone]  Magic Mouthwash  Address: [facility] [address] [facility] [address]   Phone: [phone]       Referrals made today  External Referral To Hematology Oncology  [facility]  Where: [facility]  Address: [address] [place] [address]  Phone: [phone]    Labs ordered today  CEA  Complete this on or around 7/18/2025.  CBC with Auto Differential  Every 4 Weeks. Complete this on or around 8/15/2025.  Must be done by 7/18/2026  Comprehensive Metabolic Panel  Every 4 Weeks. Complete this on or around 8/15/2025.  Must be done by 7/18/2026    RV- [id number]- [facility] will call pt. To schedule. [facility] for [facility]    Referral faxed and sent to [facility] for oncology/Hematology    Called Michelle Grimaldo for referral. Pt. Verbalized understanding    An After Visit Summary was printed and given to the patient.

## 2025-07-18 NOTE — PATIENT INSTRUCTIONS
Rtc in 2 weeks at [facility] 7/31 and contact [facility] to resume avastin asap  Labs today

## 2025-07-30 ENCOUNTER — TELEPHONE (OUTPATIENT)
Dept: INFUSION THERAPY | Facility: MEDICAL CENTER | Age: 45
End: 2025-07-30

## 2025-07-30 NOTE — TELEPHONE ENCOUNTER
I TRIED TO CALL MARCELO AGAIN TO GET SCHEDULED FOR HIS TX AND MD VISIT AND HAD TO LEAVE A MESSAGE AGAIN TO CALL THE OFFICE TO SCHEDULE.

## 2025-08-08 ENCOUNTER — HOSPITAL ENCOUNTER (OUTPATIENT)
Facility: MEDICAL CENTER | Age: 45
End: 2025-08-08
Payer: COMMERCIAL

## 2025-08-14 ENCOUNTER — HOSPITAL ENCOUNTER (OUTPATIENT)
Dept: INFUSION THERAPY | Facility: MEDICAL CENTER | Age: 45
Discharge: HOME | End: 2025-08-14
Payer: COMMERCIAL

## 2025-08-14 ENCOUNTER — HOSPITAL ENCOUNTER (OUTPATIENT)
Facility: MEDICAL CENTER | Age: 45
End: 2025-08-14
Payer: COMMERCIAL

## 2025-08-14 VITALS — TEMPERATURE: 98.4 F | SYSTOLIC BLOOD PRESSURE: 127 MMHG | HEART RATE: 106 BPM | DIASTOLIC BLOOD PRESSURE: 94 MMHG

## 2025-08-14 DIAGNOSIS — C18.7 CANCER OF SIGMOID (HCC): Primary | ICD-10-CM

## 2025-08-14 DIAGNOSIS — C77.1 METASTASIS TO MEDIASTINAL LYMPH NODE (HCC): ICD-10-CM

## 2025-08-14 LAB
ALBUMIN SERPL-MCNC: 3.9 G/DL (ref 3.5–5.2)
ALBUMIN/GLOB SERPL: 1.1 {RATIO} (ref 1–2.5)
ALP SERPL-CCNC: 205 U/L (ref 40–129)
ALT SERPL-CCNC: 10 U/L (ref 10–50)
ANION GAP SERPL CALCULATED.3IONS-SCNC: 19 MMOL/L (ref 9–16)
AST SERPL-CCNC: 37 U/L (ref 10–50)
BACTERIA URNS QL MICRO: ABNORMAL
BASOPHILS # BLD: 0 K/UL (ref 0–0.2)
BASOPHILS NFR BLD: 0 %
BILIRUB SERPL-MCNC: 0.6 MG/DL (ref 0–1.2)
BILIRUB UR QL STRIP: ABNORMAL
BUN SERPL-MCNC: 16 MG/DL (ref 6–20)
CALCIUM SERPL-MCNC: 9.8 MG/DL (ref 8.6–10.4)
CEA SERPL-MCNC: 2472 NG/ML (ref 0–3.8)
CHLORIDE SERPL-SCNC: 96 MMOL/L (ref 98–107)
CLARITY UR: CLEAR
CO2 SERPL-SCNC: 22 MMOL/L (ref 20–31)
COLOR UR: YELLOW
CREAT SERPL-MCNC: 1.3 MG/DL (ref 0.7–1.2)
EOSINOPHIL # BLD: 0.07 K/UL (ref 0–0.4)
EOSINOPHILS RELATIVE PERCENT: 1 % (ref 1–4)
EPI CELLS #/AREA URNS HPF: ABNORMAL /HPF (ref 0–5)
ERYTHROCYTE [DISTWIDTH] IN BLOOD BY AUTOMATED COUNT: 23.9 % (ref 11.8–14.4)
GFR, ESTIMATED: 67 ML/MIN/1.73M2
GLUCOSE SERPL-MCNC: 97 MG/DL (ref 74–99)
GLUCOSE UR STRIP-MCNC: NEGATIVE MG/DL
HCT VFR BLD AUTO: 32.3 % (ref 40.7–50.3)
HGB BLD-MCNC: 11.2 G/DL (ref 13–17)
HGB UR QL STRIP.AUTO: ABNORMAL
IMM GRANULOCYTES # BLD AUTO: 0 K/UL (ref 0–0.3)
IMM GRANULOCYTES NFR BLD: 0 %
KETONES UR STRIP-MCNC: ABNORMAL MG/DL
LEUKOCYTE ESTERASE UR QL STRIP: ABNORMAL
LYMPHOCYTES NFR BLD: 1.63 K/UL (ref 1–4.8)
LYMPHOCYTES RELATIVE PERCENT: 24 % (ref 24–44)
MCH RBC QN AUTO: 29.6 PG (ref 25.2–33.5)
MCHC RBC AUTO-ENTMCNC: 34.7 G/DL (ref 28.4–34.8)
MCV RBC AUTO: 85.4 FL (ref 82.6–102.9)
MONOCYTES NFR BLD: 0.48 K/UL (ref 0.2–0.8)
MONOCYTES NFR BLD: 7 % (ref 1–7)
MUCOUS THREADS URNS QL MICRO: ABNORMAL
NEUTROPHILS NFR BLD: 68 % (ref 36–66)
NEUTS SEG NFR BLD: 4.62 K/UL (ref 1.8–7.7)
NITRITE UR QL STRIP: NEGATIVE
NRBC BLD-RTO: 0.7 PER 100 WBC
PH UR STRIP: 5.5 [PH] (ref 5–8)
PLATELET # BLD AUTO: 324 K/UL (ref 138–453)
PMV BLD AUTO: 8.6 FL (ref 8.1–13.5)
POTASSIUM SERPL-SCNC: 3.8 MMOL/L (ref 3.7–5.3)
PROT SERPL-MCNC: 7.4 G/DL (ref 6.6–8.7)
PROT UR STRIP-MCNC: ABNORMAL MG/DL
RBC # BLD AUTO: 3.78 M/UL (ref 4.21–5.77)
RBC #/AREA URNS HPF: ABNORMAL /HPF (ref 0–2)
SODIUM SERPL-SCNC: 136 MMOL/L (ref 136–145)
SP GR UR STRIP: 1.02 (ref 1–1.03)
UROBILINOGEN UR STRIP-ACNC: NORMAL EU/DL (ref 0–1)
WBC #/AREA URNS HPF: ABNORMAL /HPF (ref 0–5)
WBC OTHER # BLD: 6.8 K/UL (ref 3.5–11.3)

## 2025-08-14 PROCEDURE — 82378 CARCINOEMBRYONIC ANTIGEN: CPT

## 2025-08-14 PROCEDURE — 81001 URINALYSIS AUTO W/SCOPE: CPT

## 2025-08-14 PROCEDURE — 2580000003 HC RX 258: Performed by: INTERNAL MEDICINE

## 2025-08-14 PROCEDURE — 2500000003 HC RX 250 WO HCPCS: Performed by: INTERNAL MEDICINE

## 2025-08-14 PROCEDURE — 80053 COMPREHEN METABOLIC PANEL: CPT

## 2025-08-14 PROCEDURE — 85025 COMPLETE CBC W/AUTO DIFF WBC: CPT

## 2025-08-14 PROCEDURE — 6360000002 HC RX W HCPCS: Performed by: INTERNAL MEDICINE

## 2025-08-14 PROCEDURE — 96365 THER/PROPH/DIAG IV INF INIT: CPT

## 2025-08-14 RX ORDER — SODIUM CHLORIDE 0.9 % (FLUSH) 0.9 %
5-40 SYRINGE (ML) INJECTION PRN
Status: CANCELLED | OUTPATIENT
Start: 2025-08-14

## 2025-08-14 RX ORDER — 0.9 % SODIUM CHLORIDE 0.9 %
500 INTRAVENOUS SOLUTION INTRAVENOUS ONCE
Status: COMPLETED | OUTPATIENT
Start: 2025-08-14 | End: 2025-08-14

## 2025-08-14 RX ORDER — ONDANSETRON 2 MG/ML
8 INJECTION INTRAMUSCULAR; INTRAVENOUS
OUTPATIENT
Start: 2025-08-14

## 2025-08-14 RX ORDER — HEPARIN 100 UNIT/ML
500 SYRINGE INTRAVENOUS PRN
Status: DISCONTINUED | OUTPATIENT
Start: 2025-08-14 | End: 2025-08-15 | Stop reason: HOSPADM

## 2025-08-14 RX ORDER — SODIUM CHLORIDE 0.9 % (FLUSH) 0.9 %
5-40 SYRINGE (ML) INJECTION PRN
Status: DISCONTINUED | OUTPATIENT
Start: 2025-08-14 | End: 2025-08-15 | Stop reason: HOSPADM

## 2025-08-14 RX ORDER — HEPARIN 100 UNIT/ML
500 SYRINGE INTRAVENOUS PRN
Status: CANCELLED | OUTPATIENT
Start: 2025-08-14

## 2025-08-14 RX ORDER — SODIUM CHLORIDE 9 MG/ML
25 INJECTION, SOLUTION INTRAVENOUS PRN
OUTPATIENT
Start: 2025-08-14

## 2025-08-14 RX ADMIN — HEPARIN 500 UNITS: 100 SYRINGE at 15:09

## 2025-08-14 RX ADMIN — SODIUM CHLORIDE, PRESERVATIVE FREE 10 ML: 5 INJECTION INTRAVENOUS at 15:09

## 2025-08-14 RX ADMIN — SODIUM CHLORIDE, PRESERVATIVE FREE 10 ML: 5 INJECTION INTRAVENOUS at 11:40

## 2025-08-14 RX ADMIN — SODIUM CHLORIDE 500 ML: 0.9 INJECTION, SOLUTION INTRAVENOUS at 11:58

## 2025-08-16 ENCOUNTER — HOSPITAL ENCOUNTER (EMERGENCY)
Age: 45
Discharge: HOME | End: 2025-08-16
Attending: EMERGENCY MEDICINE
Payer: COMMERCIAL

## 2025-08-16 VITALS
HEART RATE: 101 BPM | HEIGHT: 67 IN | RESPIRATION RATE: 16 BRPM | SYSTOLIC BLOOD PRESSURE: 131 MMHG | WEIGHT: 167.99 LBS | TEMPERATURE: 97.9 F | BODY MASS INDEX: 26.37 KG/M2 | OXYGEN SATURATION: 95 % | DIASTOLIC BLOOD PRESSURE: 96 MMHG

## 2025-08-16 DIAGNOSIS — T45.1X5A CHEMOTHERAPY INDUCED DIARRHEA: Primary | ICD-10-CM

## 2025-08-16 DIAGNOSIS — K52.1 CHEMOTHERAPY INDUCED DIARRHEA: Primary | ICD-10-CM

## 2025-08-16 DIAGNOSIS — R11.0 CHEMOTHERAPY-INDUCED NAUSEA: ICD-10-CM

## 2025-08-16 DIAGNOSIS — T45.1X5A CHEMOTHERAPY-INDUCED NAUSEA: ICD-10-CM

## 2025-08-16 LAB
ALBUMIN SERPL-MCNC: 4.1 G/DL (ref 3.5–5.2)
ALBUMIN/GLOB SERPL: 1 {RATIO} (ref 1–2.5)
ALP SERPL-CCNC: 207 U/L (ref 40–129)
ALT SERPL-CCNC: 12 U/L (ref 10–50)
ANION GAP SERPL CALCULATED.3IONS-SCNC: 17 MMOL/L (ref 9–16)
AST SERPL-CCNC: 46 U/L (ref 10–50)
BASOPHILS # BLD: 0 K/UL (ref 0–0.2)
BASOPHILS NFR BLD: 0 % (ref 0–2)
BILIRUB SERPL-MCNC: 0.6 MG/DL (ref 0–1.2)
BUN SERPL-MCNC: 17 MG/DL (ref 6–20)
CALCIUM SERPL-MCNC: 9.9 MG/DL (ref 8.6–10.4)
CELLS COUNTED: 200
CHLORIDE SERPL-SCNC: 94 MMOL/L (ref 98–107)
CO2 SERPL-SCNC: 22 MMOL/L (ref 20–31)
CREAT SERPL-MCNC: 1.3 MG/DL (ref 0.7–1.2)
EOSINOPHIL # BLD: 0.13 K/UL (ref 0–0.4)
EOSINOPHILS RELATIVE PERCENT: 2 % (ref 1–4)
ERYTHROCYTE [DISTWIDTH] IN BLOOD BY AUTOMATED COUNT: 24.3 % (ref 11.8–14.4)
GFR, ESTIMATED: 69 ML/MIN/1.73M2
GLUCOSE SERPL-MCNC: 106 MG/DL (ref 74–99)
HCT VFR BLD AUTO: 33 % (ref 40.7–50.3)
HGB BLD-MCNC: 11 G/DL (ref 13–17)
IMM GRANULOCYTES # BLD AUTO: 0.13 K/UL (ref 0–0.3)
IMM GRANULOCYTES NFR BLD: 2 %
LYMPHOCYTES NFR BLD: 1.47 K/UL (ref 1–4.8)
LYMPHOCYTES RELATIVE PERCENT: 23 % (ref 24–44)
MCH RBC QN AUTO: 28.6 PG (ref 25.2–33.5)
MCHC RBC AUTO-ENTMCNC: 33.3 G/DL (ref 28.4–34.8)
MCV RBC AUTO: 85.7 FL (ref 82.6–102.9)
MONOCYTES NFR BLD: 0.83 K/UL (ref 0.1–0.8)
MONOCYTES NFR BLD: 13 % (ref 1–7)
MORPHOLOGY: ABNORMAL
NEUTROPHILS NFR BLD: 60 % (ref 36–66)
NEUTS SEG NFR BLD: 3.84 K/UL (ref 1.8–7.7)
NRBC BLD-RTO: 0.6 PER 100 WBC
PLATELET # BLD AUTO: 323 K/UL (ref 138–453)
PMV BLD AUTO: 9.1 FL (ref 8.1–13.5)
POTASSIUM SERPL-SCNC: 4.4 MMOL/L (ref 3.7–5.3)
PROT SERPL-MCNC: 8.1 G/DL (ref 6.6–8.7)
RBC # BLD AUTO: 3.85 M/UL (ref 4.21–5.77)
SODIUM SERPL-SCNC: 133 MMOL/L (ref 136–145)
WBC OTHER # BLD: 6.4 K/UL (ref 3.5–11.3)

## 2025-08-16 PROCEDURE — 99284 EMERGENCY DEPT VISIT MOD MDM: CPT

## 2025-08-16 PROCEDURE — 85025 COMPLETE CBC W/AUTO DIFF WBC: CPT

## 2025-08-16 PROCEDURE — 80053 COMPREHEN METABOLIC PANEL: CPT

## 2025-08-16 PROCEDURE — 6360000002 HC RX W HCPCS

## 2025-08-16 PROCEDURE — 96361 HYDRATE IV INFUSION ADD-ON: CPT

## 2025-08-16 PROCEDURE — 96374 THER/PROPH/DIAG INJ IV PUSH: CPT

## 2025-08-16 PROCEDURE — 2580000003 HC RX 258

## 2025-08-16 RX ORDER — 0.9 % SODIUM CHLORIDE 0.9 %
1000 INTRAVENOUS SOLUTION INTRAVENOUS ONCE
Status: COMPLETED | OUTPATIENT
Start: 2025-08-16 | End: 2025-08-16

## 2025-08-16 RX ORDER — ONDANSETRON 2 MG/ML
4 INJECTION INTRAMUSCULAR; INTRAVENOUS ONCE
Status: COMPLETED | OUTPATIENT
Start: 2025-08-16 | End: 2025-08-16

## 2025-08-16 RX ADMIN — ONDANSETRON 4 MG: 2 INJECTION INTRAMUSCULAR; INTRAVENOUS at 18:33

## 2025-08-16 RX ADMIN — SODIUM CHLORIDE 1000 ML: 9 INJECTION, SOLUTION INTRAVENOUS at 18:33

## 2025-08-16 ASSESSMENT — LIFESTYLE VARIABLES
HOW OFTEN DO YOU HAVE A DRINK CONTAINING ALCOHOL: NEVER
HOW MANY STANDARD DRINKS CONTAINING ALCOHOL DO YOU HAVE ON A TYPICAL DAY: PATIENT DOES NOT DRINK

## 2025-08-18 ENCOUNTER — HOSPITAL ENCOUNTER (OUTPATIENT)
Facility: MEDICAL CENTER | Age: 45
End: 2025-08-18

## 2025-08-20 ENCOUNTER — HOSPITAL ENCOUNTER (OUTPATIENT)
Facility: MEDICAL CENTER | Age: 45
End: 2025-08-20
Payer: COMMERCIAL

## 2025-08-21 ENCOUNTER — OFFICE VISIT (OUTPATIENT)
Age: 45
End: 2025-08-21
Payer: COMMERCIAL

## 2025-08-21 ENCOUNTER — HOSPITAL ENCOUNTER (INPATIENT)
Age: 45
LOS: 7 days | Discharge: HOME | DRG: 987 | End: 2025-08-28
Attending: EMERGENCY MEDICINE | Admitting: STUDENT IN AN ORGANIZED HEALTH CARE EDUCATION/TRAINING PROGRAM
Payer: COMMERCIAL

## 2025-08-21 ENCOUNTER — APPOINTMENT (OUTPATIENT)
Dept: CT IMAGING | Age: 45
DRG: 987 | End: 2025-08-21
Payer: COMMERCIAL

## 2025-08-21 ENCOUNTER — HOSPITAL ENCOUNTER (OUTPATIENT)
Dept: INFUSION THERAPY | Facility: MEDICAL CENTER | Age: 45
Discharge: HOME | End: 2025-08-21
Payer: COMMERCIAL

## 2025-08-21 ENCOUNTER — TELEPHONE (OUTPATIENT)
Age: 45
End: 2025-08-21

## 2025-08-21 ENCOUNTER — APPOINTMENT (OUTPATIENT)
Age: 45
DRG: 987 | End: 2025-08-21
Attending: STUDENT IN AN ORGANIZED HEALTH CARE EDUCATION/TRAINING PROGRAM
Payer: COMMERCIAL

## 2025-08-21 VITALS
DIASTOLIC BLOOD PRESSURE: 94 MMHG | RESPIRATION RATE: 16 BRPM | TEMPERATURE: 97.9 F | SYSTOLIC BLOOD PRESSURE: 135 MMHG | HEART RATE: 110 BPM

## 2025-08-21 VITALS
DIASTOLIC BLOOD PRESSURE: 94 MMHG | TEMPERATURE: 97.9 F | SYSTOLIC BLOOD PRESSURE: 135 MMHG | RESPIRATION RATE: 20 BRPM | HEART RATE: 110 BPM

## 2025-08-21 DIAGNOSIS — R10.9 INTRACTABLE ABDOMINAL PAIN: ICD-10-CM

## 2025-08-21 DIAGNOSIS — C18.7 CANCER OF SIGMOID (HCC): ICD-10-CM

## 2025-08-21 DIAGNOSIS — R31.0 GROSS HEMATURIA: ICD-10-CM

## 2025-08-21 DIAGNOSIS — C77.1 METASTASIS TO MEDIASTINAL LYMPH NODE (HCC): ICD-10-CM

## 2025-08-21 DIAGNOSIS — D64.9 NORMOCYTIC ANEMIA: ICD-10-CM

## 2025-08-21 DIAGNOSIS — C77.1 METASTASIS TO MEDIASTINAL LYMPH NODE (HCC): Primary | ICD-10-CM

## 2025-08-21 DIAGNOSIS — R31.9 HEMATURIA, UNSPECIFIED TYPE: Primary | ICD-10-CM

## 2025-08-21 DIAGNOSIS — R33.8 CLOT RETENTION OF URINE: ICD-10-CM

## 2025-08-21 DIAGNOSIS — T45.1X5A ANEMIA ASSOCIATED WITH CHEMOTHERAPY: ICD-10-CM

## 2025-08-21 DIAGNOSIS — Z86.718 HISTORY OF DVT (DEEP VEIN THROMBOSIS): ICD-10-CM

## 2025-08-21 DIAGNOSIS — I30.9 ACUTE PERICARDIAL EFFUSION: ICD-10-CM

## 2025-08-21 DIAGNOSIS — C18.7 CANCER OF SIGMOID (HCC): Primary | ICD-10-CM

## 2025-08-21 DIAGNOSIS — D64.81 ANEMIA ASSOCIATED WITH CHEMOTHERAPY: ICD-10-CM

## 2025-08-21 DIAGNOSIS — I31.39 PERICARDIAL EFFUSION: ICD-10-CM

## 2025-08-21 LAB
ALBUMIN SERPL-MCNC: 4 G/DL (ref 3.5–5.2)
ALBUMIN SERPL-MCNC: 4.3 G/DL (ref 3.5–5.2)
ALBUMIN/GLOB SERPL: 1.2 {RATIO} (ref 1–2.5)
ALBUMIN/GLOB SERPL: 1.3 {RATIO} (ref 1–2.5)
ALP SERPL-CCNC: 241 U/L (ref 40–129)
ALP SERPL-CCNC: 254 U/L (ref 40–129)
ALT SERPL-CCNC: 10 U/L (ref 10–50)
ALT SERPL-CCNC: 13 U/L (ref 10–50)
ANION GAP SERPL CALCULATED.3IONS-SCNC: 18 MMOL/L (ref 9–16)
ANION GAP SERPL CALCULATED.3IONS-SCNC: 19 MMOL/L (ref 9–16)
AST SERPL-CCNC: 48 U/L (ref 10–50)
AST SERPL-CCNC: 50 U/L (ref 10–50)
BACTERIA URNS QL MICRO: ABNORMAL
BASOPHILS # BLD: 0.09 K/UL (ref 0–0.2)
BASOPHILS # BLD: <0.03 K/UL (ref 0–0.2)
BASOPHILS NFR BLD: 0 % (ref 0–2)
BASOPHILS NFR BLD: 1 %
BILIRUB SERPL-MCNC: 0.8 MG/DL (ref 0–1.2)
BILIRUB SERPL-MCNC: 0.9 MG/DL (ref 0–1.2)
BILIRUB UR QL STRIP: NEGATIVE
BILIRUB UR QL STRIP: NEGATIVE
BNP SERPL-MCNC: 223 PG/ML (ref 0–125)
BUN SERPL-MCNC: 23 MG/DL (ref 6–20)
BUN SERPL-MCNC: 23 MG/DL (ref 6–20)
CALCIUM SERPL-MCNC: 9.3 MG/DL (ref 8.6–10.4)
CALCIUM SERPL-MCNC: 9.5 MG/DL (ref 8.6–10.4)
CHLORIDE SERPL-SCNC: 93 MMOL/L (ref 98–107)
CHLORIDE SERPL-SCNC: 93 MMOL/L (ref 98–107)
CLARITY UR: ABNORMAL
CLARITY UR: ABNORMAL
CO2 SERPL-SCNC: 21 MMOL/L (ref 20–31)
CO2 SERPL-SCNC: 21 MMOL/L (ref 20–31)
COLOR UR: ABNORMAL
COLOR UR: ABNORMAL
CREAT SERPL-MCNC: 1.6 MG/DL (ref 0.7–1.2)
CREAT SERPL-MCNC: 1.6 MG/DL (ref 0.7–1.2)
ECHO AO ROOT DIAM: 3.3 CM
ECHO AO ROOT INDEX: 1.76 CM/M2
ECHO AV AREA PEAK VELOCITY: 3.4 CM2
ECHO AV AREA VTI: 3.9 CM2
ECHO AV AREA/BSA PEAK VELOCITY: 1.8 CM2/M2
ECHO AV AREA/BSA VTI: 2.1 CM2/M2
ECHO AV MEAN GRADIENT: 2 MMHG
ECHO AV MEAN VELOCITY: 0.7 M/S
ECHO AV PEAK GRADIENT: 4 MMHG
ECHO AV PEAK VELOCITY: 1 M/S
ECHO AV VELOCITY RATIO: 0.8
ECHO AV VTI: 16.4 CM
ECHO BSA: 1.89 M2
ECHO EST RA PRESSURE: 8 MMHG
ECHO LA DIAMETER INDEX: 1.5 CM/M2
ECHO LA DIAMETER: 2.8 CM
ECHO LA TO AORTIC ROOT RATIO: 0.85
ECHO LV E' LATERAL VELOCITY: 7.51 CM/S
ECHO LV E' SEPTAL VELOCITY: 5.44 CM/S
ECHO LV EF PHYSICIAN: 55 %
ECHO LV INTERNAL DIMENSION DIASTOLE INDEX: 1.87 CM/M2
ECHO LV INTERNAL DIMENSION DIASTOLIC: 3.5 CM (ref 4.2–5.9)
ECHO LV IVSD: 0.9 CM (ref 0.6–1)
ECHO LV MASS 2D: 81.9 G (ref 88–224)
ECHO LV MASS INDEX 2D: 43.8 G/M2 (ref 49–115)
ECHO LV POSTERIOR WALL DIASTOLIC: 0.8 CM (ref 0.6–1)
ECHO LV RELATIVE WALL THICKNESS RATIO: 0.46
ECHO LVOT AREA: 4.2 CM2
ECHO LVOT AV VTI INDEX: 0.93
ECHO LVOT DIAM: 2.3 CM
ECHO LVOT MEAN GRADIENT: 1 MMHG
ECHO LVOT PEAK GRADIENT: 3 MMHG
ECHO LVOT PEAK VELOCITY: 0.8 M/S
ECHO LVOT STROKE VOLUME INDEX: 34 ML/M2
ECHO LVOT SV: 63.5 ML
ECHO LVOT VTI: 15.3 CM
ECHO MV A VELOCITY: 0.89 M/S
ECHO MV AREA VTI: 5.6 CM2
ECHO MV E DECELERATION TIME (DT): 149 MS
ECHO MV E VELOCITY: 0.74 M/S
ECHO MV E/A RATIO: 0.83
ECHO MV E/E' LATERAL: 9.85
ECHO MV E/E' RATIO (AVERAGED): 11.73
ECHO MV E/E' SEPTAL: 13.6
ECHO MV LVOT VTI INDEX: 0.74
ECHO MV MAX VELOCITY: 1.1 M/S
ECHO MV MEAN GRADIENT: 2 MMHG
ECHO MV MEAN VELOCITY: 0.6 M/S
ECHO MV PEAK GRADIENT: 4 MMHG
ECHO MV VTI: 11.3 CM
ECHO PV MAX VELOCITY: 0.9 M/S
ECHO PV PEAK GRADIENT: 3 MMHG
ECHO RIGHT VENTRICULAR SYSTOLIC PRESSURE (RVSP): 38 MMHG
ECHO RV FREE WALL PEAK S': 34.9 CM/S
ECHO RV TAPSE: 2.5 CM (ref 1.7–?)
ECHO TV REGURGITANT MAX VELOCITY: 2.76 M/S
ECHO TV REGURGITANT PEAK GRADIENT: 30 MMHG
EOSINOPHIL # BLD: 0 K/UL (ref 0–0.4)
EOSINOPHIL # BLD: <0.03 K/UL (ref 0–0.44)
EOSINOPHILS RELATIVE PERCENT: 0 % (ref 1–4)
EOSINOPHILS RELATIVE PERCENT: 0 % (ref 1–4)
EPI CELLS #/AREA URNS HPF: ABNORMAL /HPF (ref 0–5)
EPI CELLS #/AREA URNS HPF: NORMAL /HPF (ref 0–5)
ERYTHROCYTE [DISTWIDTH] IN BLOOD BY AUTOMATED COUNT: 23.8 % (ref 11.8–14.4)
ERYTHROCYTE [DISTWIDTH] IN BLOOD BY AUTOMATED COUNT: 24 % (ref 11.8–14.4)
GFR, ESTIMATED: 55 ML/MIN/1.73M2
GFR, ESTIMATED: 56 ML/MIN/1.73M2
GLUCOSE SERPL-MCNC: 96 MG/DL (ref 74–99)
GLUCOSE SERPL-MCNC: 98 MG/DL (ref 74–99)
GLUCOSE UR STRIP-MCNC: ABNORMAL MG/DL
GLUCOSE UR STRIP-MCNC: NEGATIVE MG/DL
HCT VFR BLD AUTO: 28 % (ref 40.7–50.3)
HCT VFR BLD AUTO: 30.2 % (ref 40.7–50.3)
HGB BLD-MCNC: 10.5 G/DL (ref 13–17)
HGB BLD-MCNC: 9.9 G/DL (ref 13–17)
HGB UR QL STRIP.AUTO: ABNORMAL
HGB UR QL STRIP.AUTO: ABNORMAL
IMM GRANULOCYTES # BLD AUTO: 0.26 K/UL (ref 0–0.3)
IMM GRANULOCYTES # BLD AUTO: 0.41 K/UL (ref 0–0.3)
IMM GRANULOCYTES NFR BLD: 3 %
IMM GRANULOCYTES NFR BLD: 5 %
INR PPP: 1.2
KETONES UR STRIP-MCNC: ABNORMAL MG/DL
KETONES UR STRIP-MCNC: ABNORMAL MG/DL
LEUKOCYTE ESTERASE UR QL STRIP: ABNORMAL
LEUKOCYTE ESTERASE UR QL STRIP: ABNORMAL
LYMPHOCYTES NFR BLD: 1.29 K/UL (ref 1.1–3.7)
LYMPHOCYTES NFR BLD: 2.09 K/UL (ref 1–4.8)
LYMPHOCYTES RELATIVE PERCENT: 16 % (ref 24–43)
LYMPHOCYTES RELATIVE PERCENT: 24 % (ref 24–44)
MCH RBC QN AUTO: 29.8 PG (ref 25.2–33.5)
MCH RBC QN AUTO: 30.3 PG (ref 25.2–33.5)
MCHC RBC AUTO-ENTMCNC: 34.8 G/DL (ref 28.4–34.8)
MCHC RBC AUTO-ENTMCNC: 35.4 G/DL (ref 28.4–34.8)
MCV RBC AUTO: 85.6 FL (ref 82.6–102.9)
MCV RBC AUTO: 85.8 FL (ref 82.6–102.9)
MONOCYTES NFR BLD: 0.81 K/UL (ref 0.1–1.2)
MONOCYTES NFR BLD: 0.87 K/UL (ref 0.2–0.8)
MONOCYTES NFR BLD: 10 % (ref 1–7)
MONOCYTES NFR BLD: 10 % (ref 3–12)
MORPHOLOGY: ABNORMAL
NEUTROPHILS NFR BLD: 62 % (ref 36–66)
NEUTROPHILS NFR BLD: 69 % (ref 36–65)
NEUTS SEG NFR BLD: 5.39 K/UL (ref 1.8–7.7)
NEUTS SEG NFR BLD: 5.58 K/UL (ref 1.5–8.1)
NITRITE UR QL STRIP: POSITIVE
NITRITE UR QL STRIP: POSITIVE
NRBC BLD-RTO: 1.5 PER 100 WBC
NRBC BLD-RTO: 1.7 PER 100 WBC
NUCLEATED RED BLOOD CELLS: 2 PER 100 WBC
PH UR STRIP: 6.5 [PH] (ref 5–8)
PH UR STRIP: 6.5 [PH] (ref 5–8)
PLATELET # BLD AUTO: 245 K/UL (ref 138–453)
PLATELET # BLD AUTO: 283 K/UL (ref 138–453)
PMV BLD AUTO: 8.5 FL (ref 8.1–13.5)
PMV BLD AUTO: 8.8 FL (ref 8.1–13.5)
POTASSIUM SERPL-SCNC: 4.1 MMOL/L (ref 3.7–5.3)
POTASSIUM SERPL-SCNC: 4.3 MMOL/L (ref 3.7–5.3)
PROT SERPL-MCNC: 7.1 G/DL (ref 6.6–8.7)
PROT SERPL-MCNC: 7.7 G/DL (ref 6.6–8.7)
PROT UR STRIP-MCNC: ABNORMAL MG/DL
PROT UR STRIP-MCNC: ABNORMAL MG/DL
PROTHROMBIN TIME: 15.9 SEC (ref 11.5–14.2)
RBC # BLD AUTO: 3.27 M/UL (ref 4.21–5.77)
RBC # BLD AUTO: 3.52 M/UL (ref 4.21–5.77)
RBC # BLD: ABNORMAL 10*6/UL
RBC #/AREA URNS HPF: ABNORMAL /HPF (ref 0–2)
RBC #/AREA URNS HPF: NORMAL /HPF (ref 0–2)
SODIUM SERPL-SCNC: 132 MMOL/L (ref 136–145)
SODIUM SERPL-SCNC: 133 MMOL/L (ref 136–145)
SP GR UR STRIP: 1.01 (ref 1–1.03)
SP GR UR STRIP: 1.02 (ref 1–1.03)
TROPONIN I SERPL HS-MCNC: 13 NG/L (ref 0–22)
UROBILINOGEN UR STRIP-ACNC: ABNORMAL EU/DL (ref 0–1)
UROBILINOGEN UR STRIP-ACNC: ABNORMAL EU/DL (ref 0–1)
WBC #/AREA URNS HPF: ABNORMAL /HPF (ref 0–5)
WBC #/AREA URNS HPF: NORMAL /HPF (ref 0–5)
WBC OTHER # BLD: 8.1 K/UL (ref 3.5–11.3)
WBC OTHER # BLD: 8.7 K/UL (ref 3.5–11.3)

## 2025-08-21 PROCEDURE — 96374 THER/PROPH/DIAG INJ IV PUSH: CPT

## 2025-08-21 PROCEDURE — 2500000003 HC RX 250 WO HCPCS: Performed by: INTERNAL MEDICINE

## 2025-08-21 PROCEDURE — 2000000000 HC ICU R&B

## 2025-08-21 PROCEDURE — 2580000003 HC RX 258: Performed by: STUDENT IN AN ORGANIZED HEALTH CARE EDUCATION/TRAINING PROGRAM

## 2025-08-21 PROCEDURE — 83880 ASSAY OF NATRIURETIC PEPTIDE: CPT

## 2025-08-21 PROCEDURE — 2500000003 HC RX 250 WO HCPCS: Performed by: EMERGENCY MEDICINE

## 2025-08-21 PROCEDURE — 96375 TX/PRO/DX INJ NEW DRUG ADDON: CPT

## 2025-08-21 PROCEDURE — 93005 ELECTROCARDIOGRAM TRACING: CPT | Performed by: STUDENT IN AN ORGANIZED HEALTH CARE EDUCATION/TRAINING PROGRAM

## 2025-08-21 PROCEDURE — 93308 TTE F-UP OR LMTD: CPT | Performed by: STUDENT IN AN ORGANIZED HEALTH CARE EDUCATION/TRAINING PROGRAM

## 2025-08-21 PROCEDURE — 82570 ASSAY OF URINE CREATININE: CPT

## 2025-08-21 PROCEDURE — 51702 INSERT TEMP BLADDER CATH: CPT

## 2025-08-21 PROCEDURE — 80053 COMPREHEN METABOLIC PANEL: CPT

## 2025-08-21 PROCEDURE — 83935 ASSAY OF URINE OSMOLALITY: CPT

## 2025-08-21 PROCEDURE — 93325 DOPPLER ECHO COLOR FLOW MAPG: CPT | Performed by: STUDENT IN AN ORGANIZED HEALTH CARE EDUCATION/TRAINING PROGRAM

## 2025-08-21 PROCEDURE — 81001 URINALYSIS AUTO W/SCOPE: CPT

## 2025-08-21 PROCEDURE — 93308 TTE F-UP OR LMTD: CPT

## 2025-08-21 PROCEDURE — 93321 DOPPLER ECHO F-UP/LMTD STD: CPT | Performed by: STUDENT IN AN ORGANIZED HEALTH CARE EDUCATION/TRAINING PROGRAM

## 2025-08-21 PROCEDURE — 36591 DRAW BLOOD OFF VENOUS DEVICE: CPT

## 2025-08-21 PROCEDURE — 85610 PROTHROMBIN TIME: CPT

## 2025-08-21 PROCEDURE — 84484 ASSAY OF TROPONIN QUANT: CPT

## 2025-08-21 PROCEDURE — 71260 CT THORAX DX C+: CPT

## 2025-08-21 PROCEDURE — 99215 OFFICE O/P EST HI 40 MIN: CPT | Performed by: INTERNAL MEDICINE

## 2025-08-21 PROCEDURE — 6360000002 HC RX W HCPCS: Performed by: INTERNAL MEDICINE

## 2025-08-21 PROCEDURE — 6360000004 HC RX CONTRAST MEDICATION: Performed by: EMERGENCY MEDICINE

## 2025-08-21 PROCEDURE — 84300 ASSAY OF URINE SODIUM: CPT

## 2025-08-21 PROCEDURE — 99285 EMERGENCY DEPT VISIT HI MDM: CPT

## 2025-08-21 PROCEDURE — 2580000003 HC RX 258: Performed by: EMERGENCY MEDICINE

## 2025-08-21 PROCEDURE — 99222 1ST HOSP IP/OBS MODERATE 55: CPT

## 2025-08-21 PROCEDURE — 85025 COMPLETE CBC W/AUTO DIFF WBC: CPT

## 2025-08-21 PROCEDURE — 6360000002 HC RX W HCPCS: Performed by: EMERGENCY MEDICINE

## 2025-08-21 PROCEDURE — 84156 ASSAY OF PROTEIN URINE: CPT

## 2025-08-21 RX ORDER — SODIUM CHLORIDE 9 MG/ML
INJECTION, SOLUTION INTRAVENOUS PRN
Status: DISCONTINUED | OUTPATIENT
Start: 2025-08-21 | End: 2025-08-28 | Stop reason: HOSPADM

## 2025-08-21 RX ORDER — SODIUM CHLORIDE 0.9 % (FLUSH) 0.9 %
10 SYRINGE (ML) INJECTION PRN
Status: DISCONTINUED | OUTPATIENT
Start: 2025-08-21 | End: 2025-08-28 | Stop reason: HOSPADM

## 2025-08-21 RX ORDER — POTASSIUM CHLORIDE 1500 MG/1
40 TABLET, EXTENDED RELEASE ORAL PRN
Status: DISCONTINUED | OUTPATIENT
Start: 2025-08-21 | End: 2025-08-28 | Stop reason: HOSPADM

## 2025-08-21 RX ORDER — 0.9 % SODIUM CHLORIDE 0.9 %
1000 INTRAVENOUS SOLUTION INTRAVENOUS ONCE
Status: COMPLETED | OUTPATIENT
Start: 2025-08-21 | End: 2025-08-22

## 2025-08-21 RX ORDER — ONDANSETRON 4 MG/1
4 TABLET, ORALLY DISINTEGRATING ORAL EVERY 8 HOURS PRN
Status: DISCONTINUED | OUTPATIENT
Start: 2025-08-21 | End: 2025-08-22

## 2025-08-21 RX ORDER — ACETAMINOPHEN 325 MG/1
650 TABLET ORAL EVERY 6 HOURS PRN
Status: DISCONTINUED | OUTPATIENT
Start: 2025-08-21 | End: 2025-08-26

## 2025-08-21 RX ORDER — MORPHINE SULFATE 4 MG/ML
4 INJECTION, SOLUTION INTRAMUSCULAR; INTRAVENOUS ONCE
Refills: 0 | Status: COMPLETED | OUTPATIENT
Start: 2025-08-21 | End: 2025-08-21

## 2025-08-21 RX ORDER — PANTOPRAZOLE SODIUM 40 MG/1
40 TABLET, DELAYED RELEASE ORAL
Status: DISCONTINUED | OUTPATIENT
Start: 2025-08-22 | End: 2025-08-23

## 2025-08-21 RX ORDER — HEPARIN 100 UNIT/ML
500 SYRINGE INTRAVENOUS PRN
OUTPATIENT
Start: 2025-08-21

## 2025-08-21 RX ORDER — SODIUM CHLORIDE 9 MG/ML
INJECTION, SOLUTION INTRAVENOUS CONTINUOUS
Status: ACTIVE | OUTPATIENT
Start: 2025-08-22 | End: 2025-08-22

## 2025-08-21 RX ORDER — SODIUM CHLORIDE 0.9 % (FLUSH) 0.9 %
5-40 SYRINGE (ML) INJECTION PRN
Status: DISCONTINUED | OUTPATIENT
Start: 2025-08-21 | End: 2025-08-22 | Stop reason: HOSPADM

## 2025-08-21 RX ORDER — IOPAMIDOL 755 MG/ML
75 INJECTION, SOLUTION INTRAVASCULAR
Status: COMPLETED | OUTPATIENT
Start: 2025-08-21 | End: 2025-08-21

## 2025-08-21 RX ORDER — POTASSIUM CHLORIDE 7.45 MG/ML
10 INJECTION INTRAVENOUS PRN
Status: DISCONTINUED | OUTPATIENT
Start: 2025-08-21 | End: 2025-08-28 | Stop reason: HOSPADM

## 2025-08-21 RX ORDER — METOCLOPRAMIDE HYDROCHLORIDE 5 MG/ML
10 INJECTION INTRAMUSCULAR; INTRAVENOUS ONCE
Status: COMPLETED | OUTPATIENT
Start: 2025-08-21 | End: 2025-08-21

## 2025-08-21 RX ORDER — 0.9 % SODIUM CHLORIDE 0.9 %
100 INTRAVENOUS SOLUTION INTRAVENOUS ONCE
Status: COMPLETED | OUTPATIENT
Start: 2025-08-21 | End: 2025-08-21

## 2025-08-21 RX ORDER — HEPARIN 100 UNIT/ML
500 SYRINGE INTRAVENOUS PRN
Status: DISCONTINUED | OUTPATIENT
Start: 2025-08-21 | End: 2025-08-22 | Stop reason: HOSPADM

## 2025-08-21 RX ORDER — SODIUM CHLORIDE 0.9 % (FLUSH) 0.9 %
5-40 SYRINGE (ML) INJECTION EVERY 12 HOURS SCHEDULED
Status: DISCONTINUED | OUTPATIENT
Start: 2025-08-22 | End: 2025-08-28 | Stop reason: HOSPADM

## 2025-08-21 RX ORDER — SODIUM CHLORIDE 9 MG/ML
25 INJECTION, SOLUTION INTRAVENOUS PRN
OUTPATIENT
Start: 2025-08-21

## 2025-08-21 RX ORDER — ONDANSETRON 2 MG/ML
8 INJECTION INTRAMUSCULAR; INTRAVENOUS
OUTPATIENT
Start: 2025-08-21

## 2025-08-21 RX ORDER — POLYETHYLENE GLYCOL 3350 17 G/17G
17 POWDER, FOR SOLUTION ORAL DAILY PRN
Status: DISCONTINUED | OUTPATIENT
Start: 2025-08-21 | End: 2025-08-28 | Stop reason: HOSPADM

## 2025-08-21 RX ORDER — SODIUM CHLORIDE 0.9 % (FLUSH) 0.9 %
5-40 SYRINGE (ML) INJECTION PRN
OUTPATIENT
Start: 2025-08-21

## 2025-08-21 RX ORDER — ONDANSETRON 2 MG/ML
4 INJECTION INTRAMUSCULAR; INTRAVENOUS EVERY 6 HOURS PRN
Status: DISCONTINUED | OUTPATIENT
Start: 2025-08-21 | End: 2025-08-22

## 2025-08-21 RX ORDER — ACETAMINOPHEN 650 MG/1
650 SUPPOSITORY RECTAL EVERY 6 HOURS PRN
Status: DISCONTINUED | OUTPATIENT
Start: 2025-08-21 | End: 2025-08-28 | Stop reason: HOSPADM

## 2025-08-21 RX ORDER — LIDOCAINE HYDROCHLORIDE 20 MG/ML
5 JELLY TOPICAL PRN
Status: DISCONTINUED | OUTPATIENT
Start: 2025-08-21 | End: 2025-08-28 | Stop reason: HOSPADM

## 2025-08-21 RX ADMIN — MORPHINE SULFATE 4 MG: 4 INJECTION, SOLUTION INTRAMUSCULAR; INTRAVENOUS at 20:13

## 2025-08-21 RX ADMIN — HEPARIN 500 UNITS: 100 SYRINGE at 15:28

## 2025-08-21 RX ADMIN — SODIUM CHLORIDE 100 ML: 9 INJECTION, SOLUTION INTRAVENOUS at 17:26

## 2025-08-21 RX ADMIN — WATER 1000 MG: 1 INJECTION INTRAMUSCULAR; INTRAVENOUS; SUBCUTANEOUS at 20:14

## 2025-08-21 RX ADMIN — SODIUM CHLORIDE, PRESERVATIVE FREE 10 ML: 5 INJECTION INTRAVENOUS at 15:28

## 2025-08-21 RX ADMIN — IOPAMIDOL 75 ML: 755 INJECTION, SOLUTION INTRAVENOUS at 17:26

## 2025-08-21 RX ADMIN — SODIUM CHLORIDE 1000 ML: 0.9 INJECTION, SOLUTION INTRAVENOUS at 23:34

## 2025-08-21 RX ADMIN — SODIUM CHLORIDE, PRESERVATIVE FREE 10 ML: 5 INJECTION INTRAVENOUS at 17:26

## 2025-08-21 RX ADMIN — METOCLOPRAMIDE HYDROCHLORIDE 10 MG: 5 INJECTION INTRAMUSCULAR; INTRAVENOUS at 20:22

## 2025-08-21 ASSESSMENT — PAIN SCALES - GENERAL: PAINLEVEL_OUTOF10: 8

## 2025-08-22 ENCOUNTER — APPOINTMENT (OUTPATIENT)
Dept: ULTRASOUND IMAGING | Age: 45
DRG: 987 | End: 2025-08-22
Payer: COMMERCIAL

## 2025-08-22 ENCOUNTER — CLINICAL DOCUMENTATION (OUTPATIENT)
Facility: HOSPITAL | Age: 45
End: 2025-08-22

## 2025-08-22 ENCOUNTER — APPOINTMENT (OUTPATIENT)
Age: 45
DRG: 987 | End: 2025-08-22
Attending: INTERNAL MEDICINE
Payer: COMMERCIAL

## 2025-08-22 PROBLEM — N30.01 ACUTE CYSTITIS WITH HEMATURIA: Status: ACTIVE | Noted: 2024-08-07

## 2025-08-22 PROBLEM — Z86.718 HISTORY OF DVT (DEEP VEIN THROMBOSIS): Status: ACTIVE | Noted: 2025-08-22

## 2025-08-22 PROBLEM — R31.9 HEMATURIA: Status: ACTIVE | Noted: 2025-08-22

## 2025-08-22 PROBLEM — N17.9 ACUTE KIDNEY INJURY: Status: ACTIVE | Noted: 2025-08-22

## 2025-08-22 LAB
ALBUMIN SERPL-MCNC: 4 G/DL (ref 3.5–5.2)
ALBUMIN/GLOB SERPL: 1.2 {RATIO} (ref 1–2.5)
ALP SERPL-CCNC: 252 U/L (ref 40–129)
ALT SERPL-CCNC: 10 U/L (ref 10–50)
ANION GAP SERPL CALCULATED.3IONS-SCNC: 18 MMOL/L (ref 9–16)
AST SERPL-CCNC: 50 U/L (ref 10–50)
BASOPHILS # BLD: 0 K/UL (ref 0–0.2)
BASOPHILS NFR BLD: 0 %
BILIRUB SERPL-MCNC: 0.6 MG/DL (ref 0–1.2)
BUN SERPL-MCNC: 22 MG/DL (ref 6–20)
CALCIUM SERPL-MCNC: 9.1 MG/DL (ref 8.6–10.4)
CHLORIDE SERPL-SCNC: 96 MMOL/L (ref 98–107)
CO2 SERPL-SCNC: 19 MMOL/L (ref 20–31)
CREAT SERPL-MCNC: 1.6 MG/DL (ref 0.7–1.2)
CREAT UR-MCNC: 125 MG/DL (ref 39–259)
ECHO BSA: 1.89 M2
ECHO LV EF PHYSICIAN: 55 %
EKG ATRIAL RATE: 119 BPM
EKG P AXIS: 83 DEGREES
EKG P-R INTERVAL: 112 MS
EKG Q-T INTERVAL: 320 MS
EKG QRS DURATION: 64 MS
EKG QTC CALCULATION (BAZETT): 450 MS
EKG R AXIS: 15 DEGREES
EKG T AXIS: 16 DEGREES
EKG VENTRICULAR RATE: 119 BPM
EOSINOPHIL # BLD: 0 K/UL (ref 0–0.4)
EOSINOPHILS RELATIVE PERCENT: 0 % (ref 1–4)
ERYTHROCYTE [DISTWIDTH] IN BLOOD BY AUTOMATED COUNT: 24.3 % (ref 11.8–14.4)
GFR, ESTIMATED: 56 ML/MIN/1.73M2
GLUCOSE FLD-MCNC: 91 MG/DL
GLUCOSE SERPL-MCNC: 97 MG/DL (ref 74–99)
HCT VFR BLD AUTO: 29.2 % (ref 40.7–50.3)
HGB BLD-MCNC: 10.1 G/DL (ref 13–17)
IMM GRANULOCYTES # BLD AUTO: 0.52 K/UL (ref 0–0.3)
IMM GRANULOCYTES NFR BLD: 6 %
INR PPP: 1.2
LYMPHOCYTES NFR BLD: 1.29 K/UL (ref 1–4.8)
LYMPHOCYTES RELATIVE PERCENT: 15 % (ref 24–44)
MAGNESIUM SERPL-MCNC: 2.4 MG/DL (ref 1.6–2.6)
MCH RBC QN AUTO: 30.1 PG (ref 25.2–33.5)
MCHC RBC AUTO-ENTMCNC: 34.6 G/DL (ref 28.4–34.8)
MCV RBC AUTO: 87.2 FL (ref 82.6–102.9)
MONOCYTES NFR BLD: 0.86 K/UL (ref 0.2–0.8)
MONOCYTES NFR BLD: 10 % (ref 1–7)
MORPHOLOGY: ABNORMAL
NEUTROPHILS NFR BLD: 69 % (ref 36–66)
NEUTS SEG NFR BLD: 5.93 K/UL (ref 1.8–7.7)
NRBC BLD-RTO: 1.5 PER 100 WBC
OSMOLALITY UR: 569 MOSM/KG (ref 80–1300)
PLATELET # BLD AUTO: 240 K/UL (ref 138–453)
PMV BLD AUTO: 8.2 FL (ref 8.1–13.5)
POTASSIUM SERPL-SCNC: 4.4 MMOL/L (ref 3.7–5.3)
PROT FLD-MCNC: 4.3 G/DL
PROT SERPL-MCNC: 7.3 G/DL (ref 6.6–8.7)
PROTHROMBIN TIME: 15.6 SEC (ref 11.5–14.2)
RBC # BLD AUTO: 3.35 M/UL (ref 4.21–5.77)
SODIUM SERPL-SCNC: 133 MMOL/L (ref 136–145)
SODIUM UR-SCNC: 26 MMOL/L
SPECIMEN TYPE: NORMAL
SPECIMEN TYPE: NORMAL
TOTAL PROTEIN, URINE: 860 MG/DL
WBC OTHER # BLD: 8.6 K/UL (ref 3.5–11.3)

## 2025-08-22 PROCEDURE — 85025 COMPLETE CBC W/AUTO DIFF WBC: CPT

## 2025-08-22 PROCEDURE — 2500000003 HC RX 250 WO HCPCS

## 2025-08-22 PROCEDURE — 87075 CULTR BACTERIA EXCEPT BLOOD: CPT

## 2025-08-22 PROCEDURE — 0W9D3ZZ DRAINAGE OF PERICARDIAL CAVITY, PERCUTANEOUS APPROACH: ICD-10-PCS | Performed by: INTERNAL MEDICINE

## 2025-08-22 PROCEDURE — 76770 US EXAM ABDO BACK WALL COMP: CPT

## 2025-08-22 PROCEDURE — C1769 GUIDE WIRE: HCPCS | Performed by: INTERNAL MEDICINE

## 2025-08-22 PROCEDURE — 99254 IP/OBS CNSLTJ NEW/EST MOD 60: CPT | Performed by: NURSE PRACTITIONER

## 2025-08-22 PROCEDURE — 51700 IRRIGATION OF BLADDER: CPT

## 2025-08-22 PROCEDURE — 33016 PERICARDIOCENTESIS W/IMAGING: CPT | Performed by: INTERNAL MEDICINE

## 2025-08-22 PROCEDURE — 33017 PRCRD DRG 6YR+ W/O CGEN CAR: CPT | Performed by: INTERNAL MEDICINE

## 2025-08-22 PROCEDURE — 2000000000 HC ICU R&B

## 2025-08-22 PROCEDURE — 88341 IMHCHEM/IMCYTCHM EA ADD ANTB: CPT

## 2025-08-22 PROCEDURE — 87205 SMEAR GRAM STAIN: CPT

## 2025-08-22 PROCEDURE — 87070 CULTURE OTHR SPECIMN AEROBIC: CPT

## 2025-08-22 PROCEDURE — 2709999900 HC NON-CHARGEABLE SUPPLY: Performed by: INTERNAL MEDICINE

## 2025-08-22 PROCEDURE — 83735 ASSAY OF MAGNESIUM: CPT

## 2025-08-22 PROCEDURE — 88112 CYTOPATH CELL ENHANCE TECH: CPT

## 2025-08-22 PROCEDURE — 88342 IMHCHEM/IMCYTCHM 1ST ANTB: CPT

## 2025-08-22 PROCEDURE — 99152 MOD SED SAME PHYS/QHP 5/>YRS: CPT | Performed by: INTERNAL MEDICINE

## 2025-08-22 PROCEDURE — 99232 SBSQ HOSP IP/OBS MODERATE 35: CPT | Performed by: FAMILY MEDICINE

## 2025-08-22 PROCEDURE — 93010 ELECTROCARDIOGRAM REPORT: CPT | Performed by: INTERNAL MEDICINE

## 2025-08-22 PROCEDURE — 76705 ECHO EXAM OF ABDOMEN: CPT

## 2025-08-22 PROCEDURE — 99211 OFF/OP EST MAY X REQ PHY/QHP: CPT

## 2025-08-22 PROCEDURE — 85610 PROTHROMBIN TIME: CPT

## 2025-08-22 PROCEDURE — 94761 N-INVAS EAR/PLS OXIMETRY MLT: CPT

## 2025-08-22 PROCEDURE — 2580000003 HC RX 258: Performed by: NURSE PRACTITIONER

## 2025-08-22 PROCEDURE — 88305 TISSUE EXAM BY PATHOLOGIST: CPT

## 2025-08-22 PROCEDURE — 84157 ASSAY OF PROTEIN OTHER: CPT

## 2025-08-22 PROCEDURE — 89051 BODY FLUID CELL COUNT: CPT

## 2025-08-22 PROCEDURE — 80053 COMPREHEN METABOLIC PANEL: CPT

## 2025-08-22 PROCEDURE — 6360000002 HC RX W HCPCS: Performed by: INTERNAL MEDICINE

## 2025-08-22 PROCEDURE — 82945 GLUCOSE OTHER FLUID: CPT

## 2025-08-22 PROCEDURE — 36415 COLL VENOUS BLD VENIPUNCTURE: CPT

## 2025-08-22 PROCEDURE — 93308 TTE F-UP OR LMTD: CPT

## 2025-08-22 PROCEDURE — 2580000003 HC RX 258

## 2025-08-22 PROCEDURE — 6360000002 HC RX W HCPCS

## 2025-08-22 PROCEDURE — 99255 IP/OBS CONSLTJ NEW/EST HI 80: CPT | Performed by: INTERNAL MEDICINE

## 2025-08-22 RX ORDER — PROCHLORPERAZINE 25 MG/1
SUPPOSITORY RECTAL DAILY PRN
Status: ON HOLD | COMMUNITY
End: 2025-08-29 | Stop reason: HOSPADM

## 2025-08-22 RX ORDER — MIDAZOLAM HYDROCHLORIDE 1 MG/ML
INJECTION, SOLUTION INTRAMUSCULAR; INTRAVENOUS PRN
Status: DISCONTINUED | OUTPATIENT
Start: 2025-08-22 | End: 2025-08-22 | Stop reason: HOSPADM

## 2025-08-22 RX ORDER — MORPHINE SULFATE 4 MG/ML
4 INJECTION, SOLUTION INTRAMUSCULAR; INTRAVENOUS
Refills: 0 | Status: DISCONTINUED | OUTPATIENT
Start: 2025-08-22 | End: 2025-08-23

## 2025-08-22 RX ORDER — MORPHINE SULFATE 2 MG/ML
2 INJECTION, SOLUTION INTRAMUSCULAR; INTRAVENOUS ONCE
Status: DISCONTINUED | OUTPATIENT
Start: 2025-08-22 | End: 2025-08-22

## 2025-08-22 RX ORDER — MORPHINE SULFATE 2 MG/ML
2 INJECTION, SOLUTION INTRAMUSCULAR; INTRAVENOUS
Refills: 0 | Status: DISCONTINUED | OUTPATIENT
Start: 2025-08-22 | End: 2025-08-23

## 2025-08-22 RX ORDER — FENTANYL CITRATE 50 UG/ML
INJECTION, SOLUTION INTRAMUSCULAR; INTRAVENOUS PRN
Status: DISCONTINUED | OUTPATIENT
Start: 2025-08-22 | End: 2025-08-22 | Stop reason: HOSPADM

## 2025-08-22 RX ADMIN — SODIUM CHLORIDE: 0.9 INJECTION, SOLUTION INTRAVENOUS at 00:50

## 2025-08-22 RX ADMIN — WATER 1000 MG: 1 INJECTION INTRAMUSCULAR; INTRAVENOUS; SUBCUTANEOUS at 20:37

## 2025-08-22 RX ADMIN — SODIUM CHLORIDE: 0.9 INJECTION, SOLUTION INTRAVENOUS at 18:21

## 2025-08-22 RX ADMIN — SODIUM CHLORIDE, PRESERVATIVE FREE 10 ML: 5 INJECTION INTRAVENOUS at 09:00

## 2025-08-22 RX ADMIN — MORPHINE SULFATE 4 MG: 4 INJECTION, SOLUTION INTRAMUSCULAR; INTRAVENOUS at 01:23

## 2025-08-22 RX ADMIN — SODIUM CHLORIDE, PRESERVATIVE FREE 10 ML: 5 INJECTION INTRAVENOUS at 20:37

## 2025-08-22 RX ADMIN — SODIUM CHLORIDE, PRESERVATIVE FREE 10 ML: 5 INJECTION INTRAVENOUS at 01:24

## 2025-08-22 ASSESSMENT — PAIN SCALES - GENERAL
PAINLEVEL_OUTOF10: 4
PAINLEVEL_OUTOF10: 10
PAINLEVEL_OUTOF10: 0
PAINLEVEL_OUTOF10: 0
PAINLEVEL_OUTOF10: 10
PAINLEVEL_OUTOF10: 0
PAINLEVEL_OUTOF10: 2
PAINLEVEL_OUTOF10: 0
PAINLEVEL_OUTOF10: 5
PAINLEVEL_OUTOF10: 2
PAINLEVEL_OUTOF10: 0
PAINLEVEL_OUTOF10: 5
PAINLEVEL_OUTOF10: 0

## 2025-08-22 ASSESSMENT — PAIN DESCRIPTION - ORIENTATION: ORIENTATION: LOWER

## 2025-08-22 ASSESSMENT — PAIN DESCRIPTION - LOCATION: LOCATION: BACK

## 2025-08-22 ASSESSMENT — PAIN - FUNCTIONAL ASSESSMENT
PAIN_FUNCTIONAL_ASSESSMENT: 0-10
PAIN_FUNCTIONAL_ASSESSMENT: 0-10

## 2025-08-23 ENCOUNTER — APPOINTMENT (OUTPATIENT)
Dept: GENERAL RADIOLOGY | Age: 45
DRG: 987 | End: 2025-08-23
Payer: COMMERCIAL

## 2025-08-23 ENCOUNTER — APPOINTMENT (OUTPATIENT)
Dept: CT IMAGING | Age: 45
DRG: 987 | End: 2025-08-23
Payer: COMMERCIAL

## 2025-08-23 PROBLEM — Z78.9 ADMITTED TO INTENSIVE CARE UNIT: Status: ACTIVE | Noted: 2025-08-23

## 2025-08-23 PROBLEM — Z71.89 GOALS OF CARE, COUNSELING/DISCUSSION: Status: ACTIVE | Noted: 2025-08-23

## 2025-08-23 LAB
ALBUMIN SERPL-MCNC: 3.6 G/DL (ref 3.5–5.2)
ALBUMIN/GLOB SERPL: 1.2 {RATIO} (ref 1–2.5)
ALP SERPL-CCNC: 256 U/L (ref 40–129)
ALT SERPL-CCNC: 10 U/L (ref 10–50)
ANION GAP SERPL CALCULATED.3IONS-SCNC: 18 MMOL/L (ref 9–16)
AST SERPL-CCNC: 58 U/L (ref 10–50)
BASOPHILS # BLD: 0 K/UL (ref 0–0.2)
BASOPHILS NFR BLD: 0 % (ref 0–2)
BILIRUB SERPL-MCNC: 0.6 MG/DL (ref 0–1.2)
BUN SERPL-MCNC: 20 MG/DL (ref 6–20)
CALCIUM SERPL-MCNC: 9 MG/DL (ref 8.6–10.4)
CHLORIDE SERPL-SCNC: 97 MMOL/L (ref 98–107)
CO2 SERPL-SCNC: 18 MMOL/L (ref 20–31)
CREAT SERPL-MCNC: 1.3 MG/DL (ref 0.7–1.2)
EOSINOPHIL # BLD: 0 K/UL (ref 0–0.44)
EOSINOPHILS RELATIVE PERCENT: 0 % (ref 1–4)
ERYTHROCYTE [DISTWIDTH] IN BLOOD BY AUTOMATED COUNT: 24.1 % (ref 11.8–14.4)
GFR, ESTIMATED: 68 ML/MIN/1.73M2
GLUCOSE SERPL-MCNC: 99 MG/DL (ref 74–99)
HCO3 VENOUS: 22.6 MMOL/L (ref 22–29)
HCT VFR BLD AUTO: 28.7 % (ref 40.7–50.3)
HGB BLD-MCNC: 9.9 G/DL (ref 13–17)
IMM GRANULOCYTES # BLD AUTO: 0.54 K/UL (ref 0–0.3)
IMM GRANULOCYTES NFR BLD: 6 %
LYMPHOCYTES NFR BLD: 1.08 K/UL (ref 1.1–3.7)
LYMPHOCYTES RELATIVE PERCENT: 12 % (ref 24–43)
MCH RBC QN AUTO: 29.6 PG (ref 25.2–33.5)
MCHC RBC AUTO-ENTMCNC: 34.5 G/DL (ref 28.4–34.8)
MCV RBC AUTO: 85.7 FL (ref 82.6–102.9)
MONOCYTES NFR BLD: 0.81 K/UL (ref 0.1–1.2)
MONOCYTES NFR BLD: 9 % (ref 3–12)
MORPHOLOGY: ABNORMAL
NEGATIVE BASE EXCESS, VEN: 1.4 MMOL/L (ref 0–2)
NEUTROPHILS NFR BLD: 73 % (ref 36–65)
NEUTS SEG NFR BLD: 6.57 K/UL (ref 1.5–8.1)
NRBC BLD-RTO: 2.1 PER 100 WBC
O2 SAT, VEN: 72.8 % (ref 60–85)
PCO2 VENOUS: 35.1 MM HG (ref 41–51)
PH VENOUS: 7.42 (ref 7.32–7.43)
PLATELET # BLD AUTO: 249 K/UL (ref 138–453)
PMV BLD AUTO: 8.7 FL (ref 8.1–13.5)
PO2 VENOUS: 37.4 MM HG (ref 30–50)
POTASSIUM SERPL-SCNC: 4.3 MMOL/L (ref 3.7–5.3)
PROT SERPL-MCNC: 6.7 G/DL (ref 6.6–8.7)
RBC # BLD AUTO: 3.35 M/UL (ref 4.21–5.77)
SODIUM SERPL-SCNC: 133 MMOL/L (ref 136–145)
WBC OTHER # BLD: 9 K/UL (ref 3.5–11.3)

## 2025-08-23 PROCEDURE — 74176 CT ABD & PELVIS W/O CONTRAST: CPT

## 2025-08-23 PROCEDURE — 99233 SBSQ HOSP IP/OBS HIGH 50: CPT | Performed by: INTERNAL MEDICINE

## 2025-08-23 PROCEDURE — 6370000000 HC RX 637 (ALT 250 FOR IP): Performed by: INTERNAL MEDICINE

## 2025-08-23 PROCEDURE — 85025 COMPLETE CBC W/AUTO DIFF WBC: CPT

## 2025-08-23 PROCEDURE — 6360000002 HC RX W HCPCS: Performed by: NURSE PRACTITIONER

## 2025-08-23 PROCEDURE — 2500000003 HC RX 250 WO HCPCS

## 2025-08-23 PROCEDURE — 6360000002 HC RX W HCPCS

## 2025-08-23 PROCEDURE — 99291 CRITICAL CARE FIRST HOUR: CPT | Performed by: INTERNAL MEDICINE

## 2025-08-23 PROCEDURE — 71045 X-RAY EXAM CHEST 1 VIEW: CPT

## 2025-08-23 PROCEDURE — 2000000000 HC ICU R&B

## 2025-08-23 PROCEDURE — 82803 BLOOD GASES ANY COMBINATION: CPT

## 2025-08-23 PROCEDURE — 6370000000 HC RX 637 (ALT 250 FOR IP): Performed by: NURSE PRACTITIONER

## 2025-08-23 PROCEDURE — 6370000000 HC RX 637 (ALT 250 FOR IP)

## 2025-08-23 PROCEDURE — 99232 SBSQ HOSP IP/OBS MODERATE 35: CPT | Performed by: FAMILY MEDICINE

## 2025-08-23 PROCEDURE — 36415 COLL VENOUS BLD VENIPUNCTURE: CPT

## 2025-08-23 PROCEDURE — 80053 COMPREHEN METABOLIC PANEL: CPT

## 2025-08-23 PROCEDURE — 6370000000 HC RX 637 (ALT 250 FOR IP): Performed by: FAMILY MEDICINE

## 2025-08-23 PROCEDURE — 51798 US URINE CAPACITY MEASURE: CPT

## 2025-08-23 PROCEDURE — 2580000003 HC RX 258: Performed by: FAMILY MEDICINE

## 2025-08-23 PROCEDURE — 2500000003 HC RX 250 WO HCPCS: Performed by: NURSE PRACTITIONER

## 2025-08-23 RX ORDER — METOPROLOL TARTRATE 1 MG/ML
5 INJECTION, SOLUTION INTRAVENOUS EVERY 6 HOURS PRN
Status: DISCONTINUED | OUTPATIENT
Start: 2025-08-23 | End: 2025-08-28 | Stop reason: HOSPADM

## 2025-08-23 RX ORDER — SODIUM CHLORIDE 9 MG/ML
INJECTION, SOLUTION INTRAVENOUS CONTINUOUS
Status: ACTIVE | OUTPATIENT
Start: 2025-08-23 | End: 2025-08-24

## 2025-08-23 RX ORDER — METOPROLOL TARTRATE 25 MG/1
25 TABLET, FILM COATED ORAL 2 TIMES DAILY
Status: DISCONTINUED | OUTPATIENT
Start: 2025-08-23 | End: 2025-08-23

## 2025-08-23 RX ORDER — 0.9 % SODIUM CHLORIDE 0.9 %
1000 INTRAVENOUS SOLUTION INTRAVENOUS ONCE
Status: COMPLETED | OUTPATIENT
Start: 2025-08-23 | End: 2025-08-23

## 2025-08-23 RX ORDER — METOPROLOL TARTRATE 25 MG/1
25 TABLET, FILM COATED ORAL ONCE
Status: COMPLETED | OUTPATIENT
Start: 2025-08-23 | End: 2025-08-23

## 2025-08-23 RX ORDER — OXYCODONE AND ACETAMINOPHEN 5; 325 MG/1; MG/1
1 TABLET ORAL EVERY 4 HOURS PRN
Refills: 0 | Status: DISCONTINUED | OUTPATIENT
Start: 2025-08-23 | End: 2025-08-28 | Stop reason: HOSPADM

## 2025-08-23 RX ORDER — ZOLPIDEM TARTRATE 5 MG/1
5 TABLET ORAL NIGHTLY PRN
Status: DISCONTINUED | OUTPATIENT
Start: 2025-08-23 | End: 2025-08-25

## 2025-08-23 RX ORDER — METOPROLOL TARTRATE 50 MG
50 TABLET ORAL 2 TIMES DAILY
Status: DISCONTINUED | OUTPATIENT
Start: 2025-08-24 | End: 2025-08-24

## 2025-08-23 RX ADMIN — SODIUM CHLORIDE, PRESERVATIVE FREE 10 ML: 5 INJECTION INTRAVENOUS at 08:58

## 2025-08-23 RX ADMIN — METOPROLOL TARTRATE 25 MG: 25 TABLET, FILM COATED ORAL at 11:22

## 2025-08-23 RX ADMIN — WATER 1000 MG: 1 INJECTION INTRAMUSCULAR; INTRAVENOUS; SUBCUTANEOUS at 20:08

## 2025-08-23 RX ADMIN — PANTOPRAZOLE SODIUM 40 MG: 40 INJECTION, POWDER, FOR SOLUTION INTRAVENOUS at 08:57

## 2025-08-23 RX ADMIN — SODIUM CHLORIDE: 0.9 INJECTION, SOLUTION INTRAVENOUS at 18:28

## 2025-08-23 RX ADMIN — SODIUM CHLORIDE 1000 ML: 0.9 INJECTION, SOLUTION INTRAVENOUS at 16:25

## 2025-08-23 RX ADMIN — SODIUM CHLORIDE, PRESERVATIVE FREE 10 ML: 5 INJECTION INTRAVENOUS at 20:17

## 2025-08-23 RX ADMIN — OXYCODONE AND ACETAMINOPHEN 1 TABLET: 5; 325 TABLET ORAL at 10:09

## 2025-08-23 RX ADMIN — METOPROLOL TARTRATE 25 MG: 25 TABLET, FILM COATED ORAL at 20:08

## 2025-08-23 RX ADMIN — METOPROLOL TARTRATE 25 MG: 25 TABLET, FILM COATED ORAL at 23:20

## 2025-08-23 RX ADMIN — Medication 3 MG: at 01:15

## 2025-08-23 RX ADMIN — ZOLPIDEM TARTRATE 5 MG: 5 TABLET, FILM COATED ORAL at 22:47

## 2025-08-23 ASSESSMENT — PAIN DESCRIPTION - DESCRIPTORS: DESCRIPTORS: ACHING;SORE

## 2025-08-23 ASSESSMENT — PAIN DESCRIPTION - LOCATION
LOCATION: ABDOMEN;GROIN
LOCATION: BACK;LEG

## 2025-08-23 ASSESSMENT — PAIN SCALES - GENERAL
PAINLEVEL_OUTOF10: 0
PAINLEVEL_OUTOF10: 1
PAINLEVEL_OUTOF10: 2
PAINLEVEL_OUTOF10: 0
PAINLEVEL_OUTOF10: 9
PAINLEVEL_OUTOF10: 4
PAINLEVEL_OUTOF10: 0
PAINLEVEL_OUTOF10: 5
PAINLEVEL_OUTOF10: 0

## 2025-08-23 ASSESSMENT — PAIN DESCRIPTION - ORIENTATION: ORIENTATION: RIGHT;LEFT

## 2025-08-23 ASSESSMENT — PAIN - FUNCTIONAL ASSESSMENT
PAIN_FUNCTIONAL_ASSESSMENT: 0-10
PAIN_FUNCTIONAL_ASSESSMENT: PREVENTS OR INTERFERES SOME ACTIVE ACTIVITIES AND ADLS
PAIN_FUNCTIONAL_ASSESSMENT: ACTIVITIES ARE NOT PREVENTED
PAIN_FUNCTIONAL_ASSESSMENT: 0-10

## 2025-08-24 LAB
ALBUMIN SERPL-MCNC: 3.1 G/DL (ref 3.5–5.2)
ALBUMIN/GLOB SERPL: 0.9 {RATIO} (ref 1–2.5)
ALP SERPL-CCNC: 335 U/L (ref 40–129)
ALT SERPL-CCNC: 8 U/L (ref 10–50)
ANION GAP SERPL CALCULATED.3IONS-SCNC: 17 MMOL/L (ref 9–16)
AST SERPL-CCNC: 70 U/L (ref 10–50)
BASOPHILS # BLD: 0.1 K/UL (ref 0–0.2)
BASOPHILS NFR BLD: 1 % (ref 0–2)
BILIRUB SERPL-MCNC: 0.6 MG/DL (ref 0–1.2)
BUN SERPL-MCNC: 19 MG/DL (ref 6–20)
CALCIUM SERPL-MCNC: 8.9 MG/DL (ref 8.6–10.4)
CHLORIDE SERPL-SCNC: 101 MMOL/L (ref 98–107)
CO2 SERPL-SCNC: 16 MMOL/L (ref 20–31)
CREAT SERPL-MCNC: 1.3 MG/DL (ref 0.7–1.2)
EOSINOPHIL # BLD: 0 K/UL (ref 0–0.44)
EOSINOPHILS RELATIVE PERCENT: 0 % (ref 1–4)
ERYTHROCYTE [DISTWIDTH] IN BLOOD BY AUTOMATED COUNT: 24.1 % (ref 11.8–14.4)
GFR, ESTIMATED: 71 ML/MIN/1.73M2
GLUCOSE SERPL-MCNC: 110 MG/DL (ref 74–99)
HCT VFR BLD AUTO: 30.1 % (ref 40.7–50.3)
HGB BLD-MCNC: 10.5 G/DL (ref 13–17)
IMM GRANULOCYTES # BLD AUTO: 0.58 K/UL (ref 0–0.3)
IMM GRANULOCYTES NFR BLD: 6 %
LYMPHOCYTES NFR BLD: 1.16 K/UL (ref 1.1–3.7)
LYMPHOCYTES RELATIVE PERCENT: 12 % (ref 24–43)
MCH RBC QN AUTO: 30.5 PG (ref 25.2–33.5)
MCHC RBC AUTO-ENTMCNC: 34.9 G/DL (ref 28.4–34.8)
MCV RBC AUTO: 87.5 FL (ref 82.6–102.9)
MONOCYTES NFR BLD: 0.78 K/UL (ref 0.1–1.2)
MONOCYTES NFR BLD: 8 % (ref 3–12)
MORPHOLOGY: ABNORMAL
NEUTROPHILS NFR BLD: 73 % (ref 36–65)
NEUTS SEG NFR BLD: 7.08 K/UL (ref 1.5–8.1)
NRBC BLD-RTO: 2.5 PER 100 WBC
PLATELET # BLD AUTO: 257 K/UL (ref 138–453)
PMV BLD AUTO: 9.3 FL (ref 8.1–13.5)
POTASSIUM SERPL-SCNC: 4.9 MMOL/L (ref 3.7–5.3)
PROT SERPL-MCNC: 6.4 G/DL (ref 6.6–8.7)
RBC # BLD AUTO: 3.44 M/UL (ref 4.21–5.77)
SODIUM SERPL-SCNC: 134 MMOL/L (ref 136–145)
TSH SERPL DL<=0.05 MIU/L-ACNC: 2.88 UIU/ML (ref 0.27–4.2)
WBC OTHER # BLD: 9.7 K/UL (ref 3.5–11.3)

## 2025-08-24 PROCEDURE — 2000000000 HC ICU R&B

## 2025-08-24 PROCEDURE — 85025 COMPLETE CBC W/AUTO DIFF WBC: CPT

## 2025-08-24 PROCEDURE — 2500000003 HC RX 250 WO HCPCS

## 2025-08-24 PROCEDURE — 6370000000 HC RX 637 (ALT 250 FOR IP): Performed by: INTERNAL MEDICINE

## 2025-08-24 PROCEDURE — 84443 ASSAY THYROID STIM HORMONE: CPT

## 2025-08-24 PROCEDURE — 2500000003 HC RX 250 WO HCPCS: Performed by: NURSE PRACTITIONER

## 2025-08-24 PROCEDURE — 99291 CRITICAL CARE FIRST HOUR: CPT | Performed by: INTERNAL MEDICINE

## 2025-08-24 PROCEDURE — 6360000002 HC RX W HCPCS

## 2025-08-24 PROCEDURE — 36415 COLL VENOUS BLD VENIPUNCTURE: CPT

## 2025-08-24 PROCEDURE — 80053 COMPREHEN METABOLIC PANEL: CPT

## 2025-08-24 PROCEDURE — 99232 SBSQ HOSP IP/OBS MODERATE 35: CPT | Performed by: FAMILY MEDICINE

## 2025-08-24 PROCEDURE — 94761 N-INVAS EAR/PLS OXIMETRY MLT: CPT

## 2025-08-24 PROCEDURE — 6360000002 HC RX W HCPCS: Performed by: NURSE PRACTITIONER

## 2025-08-24 PROCEDURE — 2580000003 HC RX 258

## 2025-08-24 PROCEDURE — 2580000003 HC RX 258: Performed by: FAMILY MEDICINE

## 2025-08-24 RX ORDER — PROPOFOL 10 MG/ML
5-50 INJECTION, EMULSION INTRAVENOUS CONTINUOUS
Status: DISCONTINUED | OUTPATIENT
Start: 2025-08-24 | End: 2025-08-24

## 2025-08-24 RX ORDER — IVABRADINE 5 MG/1
5 TABLET, FILM COATED ORAL 2 TIMES DAILY WITH MEALS
Status: DISCONTINUED | OUTPATIENT
Start: 2025-08-24 | End: 2025-08-24

## 2025-08-24 RX ORDER — METOPROLOL TARTRATE 50 MG
100 TABLET ORAL 2 TIMES DAILY
Status: DISCONTINUED | OUTPATIENT
Start: 2025-08-24 | End: 2025-08-28 | Stop reason: HOSPADM

## 2025-08-24 RX ORDER — IVABRADINE 5 MG/1
7.5 TABLET, FILM COATED ORAL 2 TIMES DAILY WITH MEALS
Status: DISCONTINUED | OUTPATIENT
Start: 2025-08-24 | End: 2025-08-28 | Stop reason: HOSPADM

## 2025-08-24 RX ORDER — SODIUM CHLORIDE 9 MG/ML
INJECTION, SOLUTION INTRAVENOUS CONTINUOUS
Status: ACTIVE | OUTPATIENT
Start: 2025-08-24 | End: 2025-08-24

## 2025-08-24 RX ADMIN — SODIUM CHLORIDE, PRESERVATIVE FREE 10 ML: 5 INJECTION INTRAVENOUS at 09:12

## 2025-08-24 RX ADMIN — PANTOPRAZOLE SODIUM 40 MG: 40 INJECTION, POWDER, FOR SOLUTION INTRAVENOUS at 09:12

## 2025-08-24 RX ADMIN — SODIUM CHLORIDE: 0.9 INJECTION, SOLUTION INTRAVENOUS at 10:39

## 2025-08-24 RX ADMIN — IVABRADINE 5 MG: 5 TABLET, FILM COATED ORAL at 12:49

## 2025-08-24 RX ADMIN — SODIUM CHLORIDE: 0.9 INJECTION, SOLUTION INTRAVENOUS at 19:54

## 2025-08-24 RX ADMIN — SODIUM CHLORIDE: 0.9 INJECTION, SOLUTION INTRAVENOUS at 04:32

## 2025-08-24 RX ADMIN — METOPROLOL TARTRATE 100 MG: 50 TABLET, FILM COATED ORAL at 09:11

## 2025-08-24 RX ADMIN — SODIUM CHLORIDE, PRESERVATIVE FREE 10 ML: 5 INJECTION INTRAVENOUS at 20:54

## 2025-08-24 RX ADMIN — WATER 1000 MG: 1 INJECTION INTRAMUSCULAR; INTRAVENOUS; SUBCUTANEOUS at 20:54

## 2025-08-24 RX ADMIN — IVABRADINE 7.5 MG: 5 TABLET, FILM COATED ORAL at 20:54

## 2025-08-24 RX ADMIN — METOPROLOL TARTRATE 100 MG: 50 TABLET, FILM COATED ORAL at 20:54

## 2025-08-24 ASSESSMENT — PAIN SCALES - GENERAL
PAINLEVEL_OUTOF10: 0

## 2025-08-25 ENCOUNTER — APPOINTMENT (OUTPATIENT)
Dept: VASCULAR LAB | Age: 45
DRG: 987 | End: 2025-08-25
Attending: FAMILY MEDICINE
Payer: COMMERCIAL

## 2025-08-25 ENCOUNTER — APPOINTMENT (OUTPATIENT)
Age: 45
DRG: 987 | End: 2025-08-25
Attending: INTERNAL MEDICINE
Payer: COMMERCIAL

## 2025-08-25 PROBLEM — E43 SEVERE MALNUTRITION: Status: ACTIVE | Noted: 2025-08-25

## 2025-08-25 LAB
ANTI-XA UNFRAC HEPARIN: 0.16 IU/L (ref 0.3–0.7)
ANTI-XA UNFRAC HEPARIN: 0.73 IU/L (ref 0.3–0.7)
APPEARANCE FLD: NORMAL
BODY FLD TYPE: NORMAL
CASE NUMBER:: NORMAL
CLOT CHECK: NORMAL
COLOR FLD: YELLOW
ECHO BSA: 1.89 M2
ECHO BSA: 1.89 M2
ECHO LV EF PHYSICIAN: 60 %
ERYTHROCYTE [DISTWIDTH] IN BLOOD BY AUTOMATED COUNT: 24.5 % (ref 11.8–14.4)
HCT VFR BLD AUTO: 30.9 % (ref 40.7–50.3)
HGB BLD-MCNC: 10.4 G/DL (ref 13–17)
INR PPP: 1.2
LDH SERPL-CCNC: 530 U/L (ref 135–225)
LYMPHOCYTES NFR FLD: 13 %
MANUAL DIF COMMENT FLD-IMP: NORMAL
MCH RBC QN AUTO: 30.1 PG (ref 25.2–33.5)
MCHC RBC AUTO-ENTMCNC: 33.7 G/DL (ref 28.4–34.8)
MCV RBC AUTO: 89.3 FL (ref 82.6–102.9)
MONOCYTES NFR FLD: 72 %
NEUTROPHILS NFR FLD: 1 %
NRBC BLD-RTO: 2.9 PER 100 WBC
NUC CELL # FLD: 170 CELLS/UL
PARTIAL THROMBOPLASTIN TIME: 32.6 SEC (ref 23.9–33.8)
PLATELET # BLD AUTO: 236 K/UL (ref 138–453)
PMV BLD AUTO: 8.9 FL (ref 8.1–13.5)
PROTHROMBIN TIME: 16 SEC (ref 11.5–14.2)
RBC # BLD AUTO: 3.46 M/UL (ref 4.21–5.77)
RBC # FLD: 3456 CELLS/UL
SPECIMEN DESCRIPTION: NORMAL
UNIDENT CELLS NFR FLD: 14 %
WBC OTHER # BLD: 11.4 K/UL (ref 3.5–11.3)

## 2025-08-25 PROCEDURE — 97530 THERAPEUTIC ACTIVITIES: CPT

## 2025-08-25 PROCEDURE — 85610 PROTHROMBIN TIME: CPT

## 2025-08-25 PROCEDURE — 6370000000 HC RX 637 (ALT 250 FOR IP): Performed by: INTERNAL MEDICINE

## 2025-08-25 PROCEDURE — 83615 LACTATE (LD) (LDH) ENZYME: CPT

## 2025-08-25 PROCEDURE — 2500000003 HC RX 250 WO HCPCS

## 2025-08-25 PROCEDURE — 2000000000 HC ICU R&B

## 2025-08-25 PROCEDURE — 36415 COLL VENOUS BLD VENIPUNCTURE: CPT

## 2025-08-25 PROCEDURE — 93970 EXTREMITY STUDY: CPT | Performed by: SURGERY

## 2025-08-25 PROCEDURE — 85027 COMPLETE CBC AUTOMATED: CPT

## 2025-08-25 PROCEDURE — 6360000002 HC RX W HCPCS: Performed by: NURSE PRACTITIONER

## 2025-08-25 PROCEDURE — 2500000003 HC RX 250 WO HCPCS: Performed by: NURSE PRACTITIONER

## 2025-08-25 PROCEDURE — 6360000002 HC RX W HCPCS: Performed by: FAMILY MEDICINE

## 2025-08-25 PROCEDURE — 93308 TTE F-UP OR LMTD: CPT

## 2025-08-25 PROCEDURE — 97162 PT EVAL MOD COMPLEX 30 MIN: CPT

## 2025-08-25 PROCEDURE — 6360000002 HC RX W HCPCS

## 2025-08-25 PROCEDURE — 93308 TTE F-UP OR LMTD: CPT | Performed by: INTERNAL MEDICINE

## 2025-08-25 PROCEDURE — 85730 THROMBOPLASTIN TIME PARTIAL: CPT

## 2025-08-25 PROCEDURE — 85520 HEPARIN ASSAY: CPT

## 2025-08-25 PROCEDURE — 94761 N-INVAS EAR/PLS OXIMETRY MLT: CPT

## 2025-08-25 PROCEDURE — 99232 SBSQ HOSP IP/OBS MODERATE 35: CPT | Performed by: FAMILY MEDICINE

## 2025-08-25 PROCEDURE — 97166 OT EVAL MOD COMPLEX 45 MIN: CPT

## 2025-08-25 PROCEDURE — 99233 SBSQ HOSP IP/OBS HIGH 50: CPT | Performed by: NURSE PRACTITIONER

## 2025-08-25 PROCEDURE — 93970 EXTREMITY STUDY: CPT

## 2025-08-25 PROCEDURE — 97535 SELF CARE MNGMENT TRAINING: CPT

## 2025-08-25 PROCEDURE — 99232 SBSQ HOSP IP/OBS MODERATE 35: CPT | Performed by: INTERNAL MEDICINE

## 2025-08-25 RX ORDER — HEPARIN SODIUM 1000 [USP'U]/ML
40 INJECTION, SOLUTION INTRAVENOUS; SUBCUTANEOUS PRN
Status: DISCONTINUED | OUTPATIENT
Start: 2025-08-25 | End: 2025-08-27

## 2025-08-25 RX ORDER — ZOLPIDEM TARTRATE 5 MG/1
5 TABLET ORAL NIGHTLY PRN
Status: DISCONTINUED | OUTPATIENT
Start: 2025-08-25 | End: 2025-08-28 | Stop reason: HOSPADM

## 2025-08-25 RX ORDER — DOCUSATE SODIUM 100 MG/1
100 CAPSULE, LIQUID FILLED ORAL DAILY PRN
Status: DISCONTINUED | OUTPATIENT
Start: 2025-08-25 | End: 2025-08-28 | Stop reason: HOSPADM

## 2025-08-25 RX ORDER — METOCLOPRAMIDE HYDROCHLORIDE 5 MG/ML
10 INJECTION INTRAMUSCULAR; INTRAVENOUS EVERY 6 HOURS
Status: DISCONTINUED | OUTPATIENT
Start: 2025-08-25 | End: 2025-08-28 | Stop reason: HOSPADM

## 2025-08-25 RX ORDER — HEPARIN SODIUM 1000 [USP'U]/ML
80 INJECTION, SOLUTION INTRAVENOUS; SUBCUTANEOUS PRN
Status: DISCONTINUED | OUTPATIENT
Start: 2025-08-25 | End: 2025-08-27

## 2025-08-25 RX ORDER — HEPARIN SODIUM 10000 [USP'U]/100ML
5-30 INJECTION, SOLUTION INTRAVENOUS CONTINUOUS
Status: DISCONTINUED | OUTPATIENT
Start: 2025-08-25 | End: 2025-08-27

## 2025-08-25 RX ADMIN — SODIUM CHLORIDE, PRESERVATIVE FREE 10 ML: 5 INJECTION INTRAVENOUS at 09:00

## 2025-08-25 RX ADMIN — PANTOPRAZOLE SODIUM 40 MG: 40 INJECTION, POWDER, FOR SOLUTION INTRAVENOUS at 13:02

## 2025-08-25 RX ADMIN — WATER 1000 MG: 1 INJECTION INTRAMUSCULAR; INTRAVENOUS; SUBCUTANEOUS at 21:01

## 2025-08-25 RX ADMIN — HEPARIN SODIUM AND DEXTROSE 18 UNITS/KG/HR: 10000; 5 INJECTION INTRAVENOUS at 13:01

## 2025-08-25 RX ADMIN — METOCLOPRAMIDE 10 MG: 5 INJECTION, SOLUTION INTRAMUSCULAR; INTRAVENOUS at 21:09

## 2025-08-25 RX ADMIN — IVABRADINE 7.5 MG: 5 TABLET, FILM COATED ORAL at 17:44

## 2025-08-25 RX ADMIN — SODIUM CHLORIDE, PRESERVATIVE FREE 10 ML: 5 INJECTION INTRAVENOUS at 22:00

## 2025-08-25 ASSESSMENT — ENCOUNTER SYMPTOMS
CHEST TIGHTNESS: 0
RHINORRHEA: 0
SINUS PRESSURE: 0
BACK PAIN: 0
COUGH: 0
VOMITING: 0
NAUSEA: 0
DIARRHEA: 1
CONSTIPATION: 0
CHOKING: 0
WHEEZING: 0
ABDOMINAL PAIN: 0
VOICE CHANGE: 0
SHORTNESS OF BREATH: 0

## 2025-08-25 ASSESSMENT — PAIN SCALES - GENERAL
PAINLEVEL_OUTOF10: 0

## 2025-08-26 ENCOUNTER — APPOINTMENT (OUTPATIENT)
Dept: GENERAL RADIOLOGY | Age: 45
DRG: 987 | End: 2025-08-26
Payer: COMMERCIAL

## 2025-08-26 ENCOUNTER — APPOINTMENT (OUTPATIENT)
Dept: ULTRASOUND IMAGING | Age: 45
DRG: 987 | End: 2025-08-26
Payer: COMMERCIAL

## 2025-08-26 ENCOUNTER — TELEPHONE (OUTPATIENT)
Dept: INFUSION THERAPY | Facility: MEDICAL CENTER | Age: 45
End: 2025-08-26

## 2025-08-26 DIAGNOSIS — C18.7 CANCER OF SIGMOID (HCC): Primary | ICD-10-CM

## 2025-08-26 DIAGNOSIS — C77.1 METASTASIS TO MEDIASTINAL LYMPH NODE (HCC): ICD-10-CM

## 2025-08-26 LAB
ALBUMIN FLD-MCNC: 2.8 G/DL
AMYLASE FLD-CCNC: 43 U/L
ANTI-XA UNFRAC HEPARIN: 0.81 IU/L (ref 0.3–0.7)
ANTI-XA UNFRAC HEPARIN: 0.85 IU/L (ref 0.3–0.7)
GLUCOSE FLD-MCNC: 103 MG/DL
LDH FLD L TO P-CCNC: 115 U/L
PROT FLD-MCNC: 3.8 G/DL
SPECIMEN TYPE: NORMAL

## 2025-08-26 PROCEDURE — 89051 BODY FLUID CELL COUNT: CPT

## 2025-08-26 PROCEDURE — 71045 X-RAY EXAM CHEST 1 VIEW: CPT

## 2025-08-26 PROCEDURE — 99233 SBSQ HOSP IP/OBS HIGH 50: CPT | Performed by: NURSE PRACTITIONER

## 2025-08-26 PROCEDURE — 82945 GLUCOSE OTHER FLUID: CPT

## 2025-08-26 PROCEDURE — 0W993ZZ DRAINAGE OF RIGHT PLEURAL CAVITY, PERCUTANEOUS APPROACH: ICD-10-PCS | Performed by: RADIOLOGY

## 2025-08-26 PROCEDURE — 87075 CULTR BACTERIA EXCEPT BLOOD: CPT

## 2025-08-26 PROCEDURE — 6370000000 HC RX 637 (ALT 250 FOR IP): Performed by: INTERNAL MEDICINE

## 2025-08-26 PROCEDURE — 2060000000 HC ICU INTERMEDIATE R&B

## 2025-08-26 PROCEDURE — 88341 IMHCHEM/IMCYTCHM EA ADD ANTB: CPT

## 2025-08-26 PROCEDURE — 87070 CULTURE OTHR SPECIMN AEROBIC: CPT

## 2025-08-26 PROCEDURE — 97530 THERAPEUTIC ACTIVITIES: CPT

## 2025-08-26 PROCEDURE — 87205 SMEAR GRAM STAIN: CPT

## 2025-08-26 PROCEDURE — 88342 IMHCHEM/IMCYTCHM 1ST ANTB: CPT

## 2025-08-26 PROCEDURE — 82150 ASSAY OF AMYLASE: CPT

## 2025-08-26 PROCEDURE — 97116 GAIT TRAINING THERAPY: CPT

## 2025-08-26 PROCEDURE — 6360000002 HC RX W HCPCS: Performed by: NURSE PRACTITIONER

## 2025-08-26 PROCEDURE — 83615 LACTATE (LD) (LDH) ENZYME: CPT

## 2025-08-26 PROCEDURE — 36415 COLL VENOUS BLD VENIPUNCTURE: CPT

## 2025-08-26 PROCEDURE — 99232 SBSQ HOSP IP/OBS MODERATE 35: CPT | Performed by: STUDENT IN AN ORGANIZED HEALTH CARE EDUCATION/TRAINING PROGRAM

## 2025-08-26 PROCEDURE — 88305 TISSUE EXAM BY PATHOLOGIST: CPT

## 2025-08-26 PROCEDURE — 99232 SBSQ HOSP IP/OBS MODERATE 35: CPT | Performed by: INTERNAL MEDICINE

## 2025-08-26 PROCEDURE — 2500000003 HC RX 250 WO HCPCS

## 2025-08-26 PROCEDURE — 32555 ASPIRATE PLEURA W/ IMAGING: CPT

## 2025-08-26 PROCEDURE — 88112 CYTOPATH CELL ENHANCE TECH: CPT

## 2025-08-26 PROCEDURE — 82042 OTHER SOURCE ALBUMIN QUAN EA: CPT

## 2025-08-26 PROCEDURE — 84157 ASSAY OF PROTEIN OTHER: CPT

## 2025-08-26 PROCEDURE — 85520 HEPARIN ASSAY: CPT

## 2025-08-26 PROCEDURE — 6360000002 HC RX W HCPCS: Performed by: FAMILY MEDICINE

## 2025-08-26 PROCEDURE — 2500000003 HC RX 250 WO HCPCS: Performed by: NURSE PRACTITIONER

## 2025-08-26 RX ORDER — PANTOPRAZOLE SODIUM 40 MG/1
40 TABLET, DELAYED RELEASE ORAL
Status: DISCONTINUED | OUTPATIENT
Start: 2025-08-27 | End: 2025-08-28 | Stop reason: HOSPADM

## 2025-08-26 RX ORDER — ACETAMINOPHEN 325 MG/1
650 TABLET ORAL EVERY 4 HOURS PRN
Status: DISCONTINUED | OUTPATIENT
Start: 2025-08-26 | End: 2025-08-28 | Stop reason: HOSPADM

## 2025-08-26 RX ADMIN — METOCLOPRAMIDE 10 MG: 5 INJECTION, SOLUTION INTRAMUSCULAR; INTRAVENOUS at 08:03

## 2025-08-26 RX ADMIN — METOPROLOL TARTRATE 100 MG: 50 TABLET, FILM COATED ORAL at 21:19

## 2025-08-26 RX ADMIN — SODIUM CHLORIDE, PRESERVATIVE FREE 10 ML: 5 INJECTION INTRAVENOUS at 21:19

## 2025-08-26 RX ADMIN — METOCLOPRAMIDE 10 MG: 5 INJECTION, SOLUTION INTRAMUSCULAR; INTRAVENOUS at 21:19

## 2025-08-26 RX ADMIN — METOPROLOL TARTRATE 100 MG: 50 TABLET, FILM COATED ORAL at 08:04

## 2025-08-26 RX ADMIN — SODIUM CHLORIDE, PRESERVATIVE FREE 10 ML: 5 INJECTION INTRAVENOUS at 08:17

## 2025-08-26 RX ADMIN — HEPARIN SODIUM AND DEXTROSE 15 UNITS/KG/HR: 10000; 5 INJECTION INTRAVENOUS at 05:32

## 2025-08-26 RX ADMIN — IVABRADINE 7.5 MG: 5 TABLET, FILM COATED ORAL at 08:03

## 2025-08-26 RX ADMIN — METOCLOPRAMIDE 10 MG: 5 INJECTION, SOLUTION INTRAMUSCULAR; INTRAVENOUS at 03:10

## 2025-08-26 RX ADMIN — PANTOPRAZOLE SODIUM 40 MG: 40 INJECTION, POWDER, FOR SOLUTION INTRAVENOUS at 08:05

## 2025-08-26 ASSESSMENT — PAIN SCALES - GENERAL
PAINLEVEL_OUTOF10: 0

## 2025-08-26 ASSESSMENT — ENCOUNTER SYMPTOMS
NAUSEA: 0
DIARRHEA: 1
ABDOMINAL PAIN: 0
COUGH: 0
SINUS PRESSURE: 0
RHINORRHEA: 0
WHEEZING: 0
SHORTNESS OF BREATH: 0
VOICE CHANGE: 0
CHEST TIGHTNESS: 0
CONSTIPATION: 0
CHOKING: 0
VOMITING: 0
BACK PAIN: 0

## 2025-08-27 ENCOUNTER — APPOINTMENT (OUTPATIENT)
Age: 45
DRG: 987 | End: 2025-08-27
Payer: COMMERCIAL

## 2025-08-27 ENCOUNTER — ANESTHESIA EVENT (OUTPATIENT)
Dept: OPERATING ROOM | Age: 45
End: 2025-08-27
Payer: COMMERCIAL

## 2025-08-27 ENCOUNTER — ANESTHESIA (OUTPATIENT)
Dept: OPERATING ROOM | Age: 45
End: 2025-08-27
Payer: COMMERCIAL

## 2025-08-27 LAB
APPEARANCE FLD: NORMAL
BODY FLD TYPE: NORMAL
CASE NUMBER:: NORMAL
CLOT CHECK: NORMAL
COLOR FLD: YELLOW
ECHO AO ROOT DIAM: 2.9 CM
ECHO AO ROOT INDEX: 1.53 CM/M2
ECHO BSA: 1.89 M2
ECHO LA DIAMETER INDEX: 1.53 CM/M2
ECHO LA DIAMETER: 2.9 CM
ECHO LA TO AORTIC ROOT RATIO: 1
ECHO LV EF PHYSICIAN: 60 %
ECHO LV FRACTIONAL SHORTENING: 33 % (ref 28–44)
ECHO LV INTERNAL DIMENSION DIASTOLE INDEX: 1.58 CM/M2
ECHO LV INTERNAL DIMENSION DIASTOLIC: 3 CM (ref 4.2–5.9)
ECHO LV INTERNAL DIMENSION SYSTOLIC INDEX: 1.05 CM/M2
ECHO LV INTERNAL DIMENSION SYSTOLIC: 2 CM
ECHO LV IVSD: 1.3 CM (ref 0.6–1)
ECHO LV MASS 2D: 116.6 G (ref 88–224)
ECHO LV MASS INDEX 2D: 61.3 G/M2 (ref 49–115)
ECHO LV POSTERIOR WALL DIASTOLIC: 1.2 CM (ref 0.6–1)
ECHO LV RELATIVE WALL THICKNESS RATIO: 0.8
ERYTHROCYTE [DISTWIDTH] IN BLOOD BY AUTOMATED COUNT: 23.8 % (ref 11.8–14.4)
HCT VFR BLD AUTO: 27 % (ref 40.7–50.3)
HGB BLD-MCNC: 9.5 G/DL (ref 13–17)
LYMPHOCYTES NFR FLD: 21 %
MCH RBC QN AUTO: 30.1 PG (ref 25.2–33.5)
MCHC RBC AUTO-ENTMCNC: 35.2 G/DL (ref 28.4–34.8)
MCV RBC AUTO: 85.4 FL (ref 82.6–102.9)
MESOTHELIAL CELLS BODY FLUID: 11 %
MONOCYTES NFR FLD: 31 %
NEUTROPHILS NFR FLD: 20 %
NRBC BLD-RTO: 4.6 PER 100 WBC
NUC CELL # FLD: 136 CELLS/UL
PLATELET # BLD AUTO: 220 K/UL (ref 138–453)
PMV BLD AUTO: 9.5 FL (ref 8.1–13.5)
RBC # BLD AUTO: 3.16 M/UL (ref 4.21–5.77)
RBC # FLD: <3000 CELLS/UL
SPECIMEN DESCRIPTION: NORMAL
SURGICAL PATHOLOGY REPORT: NORMAL
UNIDENT CELLS NFR FLD: 17 %
WBC OTHER # BLD: 11.5 K/UL (ref 3.5–11.3)

## 2025-08-27 PROCEDURE — 2500000003 HC RX 250 WO HCPCS: Performed by: UROLOGY

## 2025-08-27 PROCEDURE — 85027 COMPLETE CBC AUTOMATED: CPT

## 2025-08-27 PROCEDURE — 99232 SBSQ HOSP IP/OBS MODERATE 35: CPT | Performed by: STUDENT IN AN ORGANIZED HEALTH CARE EDUCATION/TRAINING PROGRAM

## 2025-08-27 PROCEDURE — 94761 N-INVAS EAR/PLS OXIMETRY MLT: CPT

## 2025-08-27 PROCEDURE — 6360000002 HC RX W HCPCS: Performed by: NURSE ANESTHETIST, CERTIFIED REGISTERED

## 2025-08-27 PROCEDURE — 6360000002 HC RX W HCPCS: Performed by: UROLOGY

## 2025-08-27 PROCEDURE — 6370000000 HC RX 637 (ALT 250 FOR IP): Performed by: STUDENT IN AN ORGANIZED HEALTH CARE EDUCATION/TRAINING PROGRAM

## 2025-08-27 PROCEDURE — 6360000002 HC RX W HCPCS: Performed by: ANESTHESIOLOGY

## 2025-08-27 PROCEDURE — 7100000000 HC PACU RECOVERY - FIRST 15 MIN: Performed by: UROLOGY

## 2025-08-27 PROCEDURE — 2709999900 HC NON-CHARGEABLE SUPPLY: Performed by: UROLOGY

## 2025-08-27 PROCEDURE — 3600000012 HC SURGERY LEVEL 2 ADDTL 15MIN: Performed by: UROLOGY

## 2025-08-27 PROCEDURE — 2580000003 HC RX 258: Performed by: NURSE ANESTHETIST, CERTIFIED REGISTERED

## 2025-08-27 PROCEDURE — 97110 THERAPEUTIC EXERCISES: CPT

## 2025-08-27 PROCEDURE — 36415 COLL VENOUS BLD VENIPUNCTURE: CPT

## 2025-08-27 PROCEDURE — 0TBB8ZX EXCISION OF BLADDER, VIA NATURAL OR ARTIFICIAL OPENING ENDOSCOPIC, DIAGNOSTIC: ICD-10-PCS | Performed by: UROLOGY

## 2025-08-27 PROCEDURE — 93308 TTE F-UP OR LMTD: CPT

## 2025-08-27 PROCEDURE — 99233 SBSQ HOSP IP/OBS HIGH 50: CPT | Performed by: SURGERY

## 2025-08-27 PROCEDURE — 88305 TISSUE EXAM BY PATHOLOGIST: CPT

## 2025-08-27 PROCEDURE — 0TC68ZZ EXTIRPATION OF MATTER FROM RIGHT URETER, VIA NATURAL OR ARTIFICIAL OPENING ENDOSCOPIC: ICD-10-PCS | Performed by: UROLOGY

## 2025-08-27 PROCEDURE — 3600000002 HC SURGERY LEVEL 2 BASE: Performed by: UROLOGY

## 2025-08-27 PROCEDURE — 2060000000 HC ICU INTERMEDIATE R&B

## 2025-08-27 PROCEDURE — 6370000000 HC RX 637 (ALT 250 FOR IP): Performed by: UROLOGY

## 2025-08-27 PROCEDURE — 97530 THERAPEUTIC ACTIVITIES: CPT

## 2025-08-27 PROCEDURE — 3700000001 HC ADD 15 MINUTES (ANESTHESIA): Performed by: UROLOGY

## 2025-08-27 PROCEDURE — 3700000000 HC ANESTHESIA ATTENDED CARE: Performed by: UROLOGY

## 2025-08-27 PROCEDURE — 7100000001 HC PACU RECOVERY - ADDTL 15 MIN: Performed by: UROLOGY

## 2025-08-27 PROCEDURE — 6360000002 HC RX W HCPCS: Performed by: NURSE PRACTITIONER

## 2025-08-27 PROCEDURE — 2580000003 HC RX 258

## 2025-08-27 PROCEDURE — 99232 SBSQ HOSP IP/OBS MODERATE 35: CPT | Performed by: INTERNAL MEDICINE

## 2025-08-27 RX ORDER — MEPERIDINE HYDROCHLORIDE 25 MG/ML
12.5 INJECTION INTRAMUSCULAR; INTRAVENOUS; SUBCUTANEOUS EVERY 5 MIN PRN
Status: DISCONTINUED | OUTPATIENT
Start: 2025-08-27 | End: 2025-08-27

## 2025-08-27 RX ORDER — MAGNESIUM HYDROXIDE 1200 MG/15ML
5000 LIQUID ORAL ONCE
Status: COMPLETED | OUTPATIENT
Start: 2025-08-27 | End: 2025-08-27

## 2025-08-27 RX ORDER — SODIUM CHLORIDE 9 MG/ML
INJECTION, SOLUTION INTRAVENOUS
Status: DISCONTINUED | OUTPATIENT
Start: 2025-08-27 | End: 2025-08-27 | Stop reason: SDUPTHER

## 2025-08-27 RX ORDER — SODIUM CHLORIDE 0.9 % (FLUSH) 0.9 %
5-40 SYRINGE (ML) INJECTION EVERY 12 HOURS SCHEDULED
Status: DISCONTINUED | OUTPATIENT
Start: 2025-08-27 | End: 2025-08-27

## 2025-08-27 RX ORDER — PROPOFOL 10 MG/ML
INJECTION, EMULSION INTRAVENOUS
Status: DISCONTINUED | OUTPATIENT
Start: 2025-08-27 | End: 2025-08-27 | Stop reason: SDUPTHER

## 2025-08-27 RX ORDER — MIDAZOLAM HYDROCHLORIDE 1 MG/ML
INJECTION, SOLUTION INTRAMUSCULAR; INTRAVENOUS
Status: DISCONTINUED | OUTPATIENT
Start: 2025-08-27 | End: 2025-08-27 | Stop reason: SDUPTHER

## 2025-08-27 RX ORDER — SODIUM CHLORIDE 0.9 % (FLUSH) 0.9 %
5-40 SYRINGE (ML) INJECTION PRN
Status: DISCONTINUED | OUTPATIENT
Start: 2025-08-27 | End: 2025-08-27

## 2025-08-27 RX ORDER — CEFAZOLIN SODIUM 1 G/3ML
INJECTION, POWDER, FOR SOLUTION INTRAMUSCULAR; INTRAVENOUS
Status: DISCONTINUED | OUTPATIENT
Start: 2025-08-27 | End: 2025-08-27 | Stop reason: SDUPTHER

## 2025-08-27 RX ORDER — PROCHLORPERAZINE EDISYLATE 5 MG/ML
10 INJECTION INTRAMUSCULAR; INTRAVENOUS
Status: DISCONTINUED | OUTPATIENT
Start: 2025-08-27 | End: 2025-08-27

## 2025-08-27 RX ORDER — SODIUM CHLORIDE 9 MG/ML
INJECTION, SOLUTION INTRAVENOUS PRN
Status: DISCONTINUED | OUTPATIENT
Start: 2025-08-27 | End: 2025-08-27

## 2025-08-27 RX ORDER — FENTANYL CITRATE 0.05 MG/ML
25 INJECTION, SOLUTION INTRAMUSCULAR; INTRAVENOUS EVERY 5 MIN PRN
Status: DISCONTINUED | OUTPATIENT
Start: 2025-08-27 | End: 2025-08-27

## 2025-08-27 RX ORDER — METOCLOPRAMIDE HYDROCHLORIDE 5 MG/ML
10 INJECTION INTRAMUSCULAR; INTRAVENOUS
Status: DISCONTINUED | OUTPATIENT
Start: 2025-08-27 | End: 2025-08-27

## 2025-08-27 RX ORDER — FENTANYL CITRATE 50 UG/ML
INJECTION, SOLUTION INTRAMUSCULAR; INTRAVENOUS
Status: DISCONTINUED | OUTPATIENT
Start: 2025-08-27 | End: 2025-08-27 | Stop reason: SDUPTHER

## 2025-08-27 RX ORDER — METOPROLOL TARTRATE 1 MG/ML
INJECTION, SOLUTION INTRAVENOUS
Status: DISCONTINUED | OUTPATIENT
Start: 2025-08-27 | End: 2025-08-27 | Stop reason: SDUPTHER

## 2025-08-27 RX ORDER — OXYCODONE HYDROCHLORIDE 5 MG/1
5 TABLET ORAL
Status: DISCONTINUED | OUTPATIENT
Start: 2025-08-27 | End: 2025-08-27

## 2025-08-27 RX ORDER — DIPHENHYDRAMINE HYDROCHLORIDE 50 MG/ML
12.5 INJECTION, SOLUTION INTRAMUSCULAR; INTRAVENOUS
Status: DISCONTINUED | OUTPATIENT
Start: 2025-08-27 | End: 2025-08-27

## 2025-08-27 RX ORDER — LABETALOL HYDROCHLORIDE 5 MG/ML
10 INJECTION, SOLUTION INTRAVENOUS ONCE
Status: COMPLETED | OUTPATIENT
Start: 2025-08-27 | End: 2025-08-27

## 2025-08-27 RX ADMIN — METOPROLOL TARTRATE 100 MG: 50 TABLET, FILM COATED ORAL at 20:49

## 2025-08-27 RX ADMIN — FENTANYL CITRATE 50 MCG: 50 INJECTION INTRAMUSCULAR; INTRAVENOUS at 16:53

## 2025-08-27 RX ADMIN — PROPOFOL 100 MCG/KG/MIN: 10 INJECTION, EMULSION INTRAVENOUS at 16:46

## 2025-08-27 RX ADMIN — SODIUM CHLORIDE, PRESERVATIVE FREE 10 ML: 5 INJECTION INTRAVENOUS at 20:49

## 2025-08-27 RX ADMIN — SODIUM CHLORIDE: 0.9 INJECTION, SOLUTION INTRAVENOUS at 03:57

## 2025-08-27 RX ADMIN — METOCLOPRAMIDE 10 MG: 5 INJECTION, SOLUTION INTRAMUSCULAR; INTRAVENOUS at 20:49

## 2025-08-27 RX ADMIN — PANTOPRAZOLE SODIUM 40 MG: 40 TABLET, DELAYED RELEASE ORAL at 05:31

## 2025-08-27 RX ADMIN — CEFAZOLIN 2 G: 1 INJECTION, POWDER, FOR SOLUTION INTRAMUSCULAR; INTRAVENOUS at 16:56

## 2025-08-27 RX ADMIN — METOCLOPRAMIDE 10 MG: 5 INJECTION, SOLUTION INTRAMUSCULAR; INTRAVENOUS at 03:17

## 2025-08-27 RX ADMIN — SODIUM CHLORIDE: 9 INJECTION, SOLUTION INTRAVENOUS at 16:41

## 2025-08-27 RX ADMIN — METOPROLOL TARTRATE 2.5 MG: 5 INJECTION INTRAVENOUS at 17:03

## 2025-08-27 RX ADMIN — LABETALOL HYDROCHLORIDE 10 MG: 5 INJECTION INTRAVENOUS at 18:08

## 2025-08-27 RX ADMIN — FENTANYL CITRATE 25 MCG: 50 INJECTION INTRAMUSCULAR; INTRAVENOUS at 16:46

## 2025-08-27 RX ADMIN — MIDAZOLAM 2 MG: 1 INJECTION INTRAMUSCULAR; INTRAVENOUS at 16:41

## 2025-08-27 RX ADMIN — FENTANYL CITRATE 25 MCG: 50 INJECTION INTRAMUSCULAR; INTRAVENOUS at 16:59

## 2025-08-27 RX ADMIN — SODIUM CHLORIDE FOR IRRIGATION 5000 ML: 0.9 SOLUTION IRRIGATION at 20:03

## 2025-08-27 RX ADMIN — METOPROLOL TARTRATE 2.5 MG: 5 INJECTION INTRAVENOUS at 16:51

## 2025-08-27 ASSESSMENT — PAIN SCALES - GENERAL
PAINLEVEL_OUTOF10: 0

## 2025-08-27 ASSESSMENT — ENCOUNTER SYMPTOMS
BACK PAIN: 0
CHOKING: 0
VOMITING: 0
SINUS PRESSURE: 0
CHEST TIGHTNESS: 0
DIARRHEA: 1
ABDOMINAL PAIN: 0
CONSTIPATION: 0
SHORTNESS OF BREATH: 0
VOICE CHANGE: 0
WHEEZING: 0
NAUSEA: 0
COUGH: 0
RHINORRHEA: 0

## 2025-08-27 ASSESSMENT — PAIN - FUNCTIONAL ASSESSMENT: PAIN_FUNCTIONAL_ASSESSMENT: FACE, LEGS, ACTIVITY, CRY, AND CONSOLABILITY (FLACC)

## 2025-08-28 ENCOUNTER — HOSPITAL ENCOUNTER (OUTPATIENT)
Age: 45
Setting detail: OBSERVATION
Discharge: HOME | DRG: 987 | End: 2025-08-29
Attending: EMERGENCY MEDICINE
Payer: COMMERCIAL

## 2025-08-28 ENCOUNTER — APPOINTMENT (OUTPATIENT)
Dept: GENERAL RADIOLOGY | Age: 45
DRG: 987 | End: 2025-08-28
Payer: COMMERCIAL

## 2025-08-28 VITALS
DIASTOLIC BLOOD PRESSURE: 95 MMHG | BODY MASS INDEX: 27.06 KG/M2 | OXYGEN SATURATION: 100 % | WEIGHT: 172.4 LBS | HEIGHT: 67 IN | TEMPERATURE: 97 F | SYSTOLIC BLOOD PRESSURE: 126 MMHG | HEART RATE: 113 BPM | RESPIRATION RATE: 18 BRPM

## 2025-08-28 DIAGNOSIS — Z51.5 HOSPICE CARE: Primary | ICD-10-CM

## 2025-08-28 PROBLEM — I31.39 PERICARDIAL EFFUSION: Status: RESOLVED | Noted: 2025-08-21 | Resolved: 2025-08-28

## 2025-08-28 PROBLEM — I30.9 ACUTE PERICARDIAL EFFUSION: Status: ACTIVE | Noted: 2025-08-28

## 2025-08-28 LAB
MICROORGANISM SPEC CULT: NORMAL
MICROORGANISM/AGENT SPEC: NORMAL
SERVICE CMNT-IMP: NORMAL
SPECIMEN DESCRIPTION: NORMAL

## 2025-08-28 PROCEDURE — 6370000000 HC RX 637 (ALT 250 FOR IP): Performed by: UROLOGY

## 2025-08-28 PROCEDURE — G0378 HOSPITAL OBSERVATION PER HR: HCPCS

## 2025-08-28 PROCEDURE — 99233 SBSQ HOSP IP/OBS HIGH 50: CPT | Performed by: INTERNAL MEDICINE

## 2025-08-28 PROCEDURE — 71045 X-RAY EXAM CHEST 1 VIEW: CPT

## 2025-08-28 PROCEDURE — 2500000003 HC RX 250 WO HCPCS: Performed by: UROLOGY

## 2025-08-28 PROCEDURE — 2580000003 HC RX 258: Performed by: STUDENT IN AN ORGANIZED HEALTH CARE EDUCATION/TRAINING PROGRAM

## 2025-08-28 PROCEDURE — 99285 EMERGENCY DEPT VISIT HI MDM: CPT

## 2025-08-28 PROCEDURE — 88112 CYTOPATH CELL ENHANCE TECH: CPT

## 2025-08-28 PROCEDURE — 6360000002 HC RX W HCPCS: Performed by: UROLOGY

## 2025-08-28 PROCEDURE — 97530 THERAPEUTIC ACTIVITIES: CPT

## 2025-08-28 PROCEDURE — 99233 SBSQ HOSP IP/OBS HIGH 50: CPT | Performed by: NURSE PRACTITIONER

## 2025-08-28 PROCEDURE — 93005 ELECTROCARDIOGRAM TRACING: CPT | Performed by: STUDENT IN AN ORGANIZED HEALTH CARE EDUCATION/TRAINING PROGRAM

## 2025-08-28 PROCEDURE — 99239 HOSP IP/OBS DSCHRG MGMT >30: CPT | Performed by: STUDENT IN AN ORGANIZED HEALTH CARE EDUCATION/TRAINING PROGRAM

## 2025-08-28 PROCEDURE — 97110 THERAPEUTIC EXERCISES: CPT

## 2025-08-28 PROCEDURE — 97535 SELF CARE MNGMENT TRAINING: CPT

## 2025-08-28 PROCEDURE — 99223 1ST HOSP IP/OBS HIGH 75: CPT | Performed by: STUDENT IN AN ORGANIZED HEALTH CARE EDUCATION/TRAINING PROGRAM

## 2025-08-28 RX ORDER — POTASSIUM CHLORIDE 1500 MG/1
40 TABLET, EXTENDED RELEASE ORAL PRN
Status: DISCONTINUED | OUTPATIENT
Start: 2025-08-28 | End: 2025-08-29 | Stop reason: HOSPADM

## 2025-08-28 RX ORDER — SODIUM CHLORIDE 9 MG/ML
INJECTION, SOLUTION INTRAVENOUS PRN
Status: DISCONTINUED | OUTPATIENT
Start: 2025-08-28 | End: 2025-08-29 | Stop reason: HOSPADM

## 2025-08-28 RX ORDER — SODIUM CHLORIDE, SODIUM LACTATE, POTASSIUM CHLORIDE, CALCIUM CHLORIDE 600; 310; 30; 20 MG/100ML; MG/100ML; MG/100ML; MG/100ML
INJECTION, SOLUTION INTRAVENOUS CONTINUOUS
Status: DISCONTINUED | OUTPATIENT
Start: 2025-08-28 | End: 2025-08-29 | Stop reason: HOSPADM

## 2025-08-28 RX ORDER — ZOLPIDEM TARTRATE 5 MG/1
5 TABLET ORAL NIGHTLY PRN
Status: DISCONTINUED | OUTPATIENT
Start: 2025-08-28 | End: 2025-08-29 | Stop reason: HOSPADM

## 2025-08-28 RX ORDER — METOPROLOL TARTRATE 50 MG
100 TABLET ORAL 2 TIMES DAILY
Status: DISCONTINUED | OUTPATIENT
Start: 2025-08-28 | End: 2025-08-29 | Stop reason: HOSPADM

## 2025-08-28 RX ORDER — POLYETHYLENE GLYCOL 3350 17 G/17G
17 POWDER, FOR SOLUTION ORAL DAILY PRN
Status: DISCONTINUED | OUTPATIENT
Start: 2025-08-28 | End: 2025-08-29 | Stop reason: HOSPADM

## 2025-08-28 RX ORDER — POTASSIUM CHLORIDE 7.45 MG/ML
10 INJECTION INTRAVENOUS PRN
Status: DISCONTINUED | OUTPATIENT
Start: 2025-08-28 | End: 2025-08-29 | Stop reason: HOSPADM

## 2025-08-28 RX ORDER — SODIUM CHLORIDE 0.9 % (FLUSH) 0.9 %
10 SYRINGE (ML) INJECTION PRN
Status: DISCONTINUED | OUTPATIENT
Start: 2025-08-28 | End: 2025-08-29 | Stop reason: HOSPADM

## 2025-08-28 RX ORDER — OXYCODONE AND ACETAMINOPHEN 5; 325 MG/1; MG/1
1 TABLET ORAL EVERY 8 HOURS PRN
Status: DISCONTINUED | OUTPATIENT
Start: 2025-08-28 | End: 2025-08-29 | Stop reason: HOSPADM

## 2025-08-28 RX ORDER — METOPROLOL TARTRATE 100 MG/1
100 TABLET ORAL 2 TIMES DAILY
Qty: 60 TABLET | Refills: 3 | Status: SHIPPED | OUTPATIENT
Start: 2025-08-28

## 2025-08-28 RX ORDER — CALCIUM CARBONATE 500 MG/1
1 TABLET, CHEWABLE ORAL DAILY PRN
Status: DISCONTINUED | OUTPATIENT
Start: 2025-08-28 | End: 2025-08-29 | Stop reason: HOSPADM

## 2025-08-28 RX ORDER — MAGNESIUM SULFATE IN WATER 40 MG/ML
2000 INJECTION, SOLUTION INTRAVENOUS PRN
Status: DISCONTINUED | OUTPATIENT
Start: 2025-08-28 | End: 2025-08-29 | Stop reason: HOSPADM

## 2025-08-28 RX ORDER — OXYCODONE AND ACETAMINOPHEN 5; 325 MG/1; MG/1
1 TABLET ORAL EVERY 8 HOURS PRN
Qty: 9 TABLET | Refills: 0 | Status: ON HOLD | OUTPATIENT
Start: 2025-08-28 | End: 2025-08-29 | Stop reason: HOSPADM

## 2025-08-28 RX ORDER — ACETAMINOPHEN 650 MG/1
650 SUPPOSITORY RECTAL EVERY 6 HOURS PRN
Status: DISCONTINUED | OUTPATIENT
Start: 2025-08-28 | End: 2025-08-29 | Stop reason: HOSPADM

## 2025-08-28 RX ORDER — ACETAMINOPHEN 325 MG/1
650 TABLET ORAL EVERY 6 HOURS PRN
Status: DISCONTINUED | OUTPATIENT
Start: 2025-08-28 | End: 2025-08-29 | Stop reason: HOSPADM

## 2025-08-28 RX ORDER — SODIUM CHLORIDE 0.9 % (FLUSH) 0.9 %
5-40 SYRINGE (ML) INJECTION EVERY 12 HOURS SCHEDULED
Status: DISCONTINUED | OUTPATIENT
Start: 2025-08-28 | End: 2025-08-29 | Stop reason: HOSPADM

## 2025-08-28 RX ORDER — ZOLPIDEM TARTRATE 5 MG/1
5 TABLET ORAL NIGHTLY PRN
Qty: 14 TABLET | Refills: 0 | Status: ON HOLD | OUTPATIENT
Start: 2025-08-28 | End: 2025-08-29 | Stop reason: HOSPADM

## 2025-08-28 RX ADMIN — METOPROLOL TARTRATE 100 MG: 50 TABLET, FILM COATED ORAL at 09:29

## 2025-08-28 RX ADMIN — SODIUM CHLORIDE, PRESERVATIVE FREE 10 ML: 5 INJECTION INTRAVENOUS at 09:30

## 2025-08-28 RX ADMIN — METOCLOPRAMIDE 10 MG: 5 INJECTION, SOLUTION INTRAMUSCULAR; INTRAVENOUS at 03:30

## 2025-08-28 RX ADMIN — METOCLOPRAMIDE 10 MG: 5 INJECTION, SOLUTION INTRAMUSCULAR; INTRAVENOUS at 09:29

## 2025-08-28 RX ADMIN — IVABRADINE 7.5 MG: 5 TABLET, FILM COATED ORAL at 09:29

## 2025-08-28 RX ADMIN — SODIUM CHLORIDE, SODIUM LACTATE, POTASSIUM CHLORIDE, AND CALCIUM CHLORIDE: .6; .31; .03; .02 INJECTION, SOLUTION INTRAVENOUS at 23:49

## 2025-08-28 RX ADMIN — METOCLOPRAMIDE 10 MG: 5 INJECTION, SOLUTION INTRAMUSCULAR; INTRAVENOUS at 14:47

## 2025-08-28 ASSESSMENT — PAIN SCALES - GENERAL
PAINLEVEL_OUTOF10: 0

## 2025-08-28 ASSESSMENT — LIFESTYLE VARIABLES
HOW MANY STANDARD DRINKS CONTAINING ALCOHOL DO YOU HAVE ON A TYPICAL DAY: PATIENT UNABLE TO ANSWER
HOW OFTEN DO YOU HAVE A DRINK CONTAINING ALCOHOL: PATIENT UNABLE TO ANSWER

## 2025-08-29 ENCOUNTER — TELEPHONE (OUTPATIENT)
Dept: FAMILY MEDICINE CLINIC | Age: 45
End: 2025-08-29

## 2025-08-29 VITALS
HEIGHT: 67 IN | SYSTOLIC BLOOD PRESSURE: 126 MMHG | DIASTOLIC BLOOD PRESSURE: 85 MMHG | TEMPERATURE: 97.3 F | OXYGEN SATURATION: 100 % | HEART RATE: 114 BPM | RESPIRATION RATE: 16 BRPM | WEIGHT: 179 LBS | BODY MASS INDEX: 28.09 KG/M2

## 2025-08-29 PROBLEM — Z51.5 HOSPICE CARE: Status: ACTIVE | Noted: 2025-08-29

## 2025-08-29 LAB
CASE NUMBER:: NORMAL
EKG ATRIAL RATE: 104 BPM
EKG P AXIS: 52 DEGREES
EKG P-R INTERVAL: 128 MS
EKG Q-T INTERVAL: 330 MS
EKG QRS DURATION: 74 MS
EKG QTC CALCULATION (BAZETT): 433 MS
EKG R AXIS: 40 DEGREES
EKG T AXIS: 38 DEGREES
EKG VENTRICULAR RATE: 104 BPM
INR PPP: 1.1
PROTHROMBIN TIME: 15 SEC (ref 11.7–14.9)
SPECIMEN DESCRIPTION: NORMAL
SURGICAL PATHOLOGY REPORT: NORMAL
SURGICAL PATHOLOGY REPORT: NORMAL

## 2025-08-29 PROCEDURE — 36415 COLL VENOUS BLD VENIPUNCTURE: CPT

## 2025-08-29 PROCEDURE — 96361 HYDRATE IV INFUSION ADD-ON: CPT

## 2025-08-29 PROCEDURE — G0378 HOSPITAL OBSERVATION PER HR: HCPCS

## 2025-08-29 PROCEDURE — 85610 PROTHROMBIN TIME: CPT

## 2025-08-29 PROCEDURE — 93010 ELECTROCARDIOGRAM REPORT: CPT | Performed by: INTERNAL MEDICINE

## 2025-08-29 PROCEDURE — 96360 HYDRATION IV INFUSION INIT: CPT

## 2025-08-29 PROCEDURE — 99239 HOSP IP/OBS DSCHRG MGMT >30: CPT | Performed by: STUDENT IN AN ORGANIZED HEALTH CARE EDUCATION/TRAINING PROGRAM

## 2025-08-29 ASSESSMENT — PAIN SCALES - GENERAL: PAINLEVEL_OUTOF10: 0

## 2025-09-01 LAB
MICROORGANISM SPEC CULT: NORMAL
MICROORGANISM/AGENT SPEC: NORMAL
SERVICE CMNT-IMP: NORMAL
SPECIMEN DESCRIPTION: NORMAL
SURGICAL PATHOLOGY REPORT: NORMAL

## 2025-09-04 ENCOUNTER — TELEPHONE (OUTPATIENT)
Age: 45
End: 2025-09-04

## (undated) DEVICE — COLOPLAST  CLAMP . FOR DRAINABLE OSTOMY BAGS.: Brand: COLOPLAST

## (undated) DEVICE — SUTURE ETHIBOND EXCEL SZ 5 L30IN NONABSORBABLE GRN L48MM CCS MB47G

## (undated) DEVICE — DRAPE, LAVH, STERILE: Brand: MEDLINE

## (undated) DEVICE — BAG URIN DRNGE 1100ML LEG CMFRT STRP W/ SLDE TAP DISP

## (undated) DEVICE — RELOAD STPL L75MM OPN H3.8MM CLS 1.5MM WIRE DIA0.2MM REG

## (undated) DEVICE — Device

## (undated) DEVICE — STRAP CATH W2XL22IN FOR 29IN LEG VELC CLSR E HK LOOP

## (undated) DEVICE — LIGASURE IMPACT FT10 COMPATIBLE: Brand: MEDLINE

## (undated) DEVICE — SUTURE PERMAHAND SZ 3-0 L18IN NONABSORBABLE BLK SILK BRAID A184H

## (undated) DEVICE — SUTURE PERMAHAND SZ 0 L18IN NONABSORBABLE BLK SILK BRAID A186H

## (undated) DEVICE — SUTURE VICRYL + SZ 4-0 L27IN ABSRB UD L26MM SH 1/2 CIR VCP415H

## (undated) DEVICE — SUTURE PERMA-HAND SZ 2-0 L30IN NONABSORBABLE BLK L26MM SH K833H

## (undated) DEVICE — ELECTRODE PT RET AD L9FT HI MOIST COND ADH HYDRGEL CORDED

## (undated) DEVICE — Device: Brand: SPOT EX ENDOSCOPIC TATTOO

## (undated) DEVICE — STAZ ENDO KIT: Brand: MEDLINE INDUSTRIES, INC.

## (undated) DEVICE — GLOVE SURG SZ 7 CRM LTX FREE POLYISOPRENE POLYMER BEAD ANTI

## (undated) DEVICE — TRAY SURG CUST CRD CATH TOLEDO

## (undated) DEVICE — SUTURE VICRYL + SZ 2-0 L18IN ABSRB CLR TIE POLYGLACTIN 910 VCP111G

## (undated) DEVICE — BOTTLE DRAINAGE 1000 CC W/ TBNG SET STRL PRE-VAC

## (undated) DEVICE — 450 ML BOTTLE OF 0.05% CHLORHEXIDINE GLUCONATE IN 99.95% STERILE WATER FOR IRRIGATION, USP AND APPLICATOR.: Brand: IRRISEPT ANTIMICROBIAL WOUND LAVAGE

## (undated) DEVICE — STRAP ARMBRD W1.5XL32IN FOAM STR YET SFT W/ HK AND LOOP

## (undated) DEVICE — DRAPE, SLUSH XL, 44X66, STERILE: Brand: MEDLINE

## (undated) DEVICE — SOLUTION IRRG H2O 3000 ML USP STRL TITAN XL CONTAINER

## (undated) DEVICE — LEGGINGS, PAIR, 31X48, STERILE: Brand: MEDLINE

## (undated) DEVICE — GAUZE,SPONGE,FLUFF,6"X6.75",STRL,5/TRAY: Brand: MEDLINE

## (undated) DEVICE — BAG URIN DRNGE 2000ML SLDE TAP LUERLOCK PRT ANTI REFLX TWR

## (undated) DEVICE — STAPLER INT L75MM CUT LN L73MM STPL LN L77MM BLU B FRM 8

## (undated) DEVICE — SUTURE VICRYL + ABSORBABLE BRAIDED 3-0 12X18 IN COAT UD VCP110G

## (undated) DEVICE — SUTURE PERMAHAND SZ 3-0 L30IN NONABSORBABLE BLK SH L26MM K832H

## (undated) DEVICE — STAPLER EXT 65MM S STL AUTO DISP PURSTRING

## (undated) DEVICE — STAPLER INT CUT LN 51MM STPL 51MM BLU CRV HD B FRM

## (undated) DEVICE — SPONGE LAP W18XL18IN WHT COT 4 PLY FLD STRUNG RADPQ DISP ST 2 PER PACK

## (undated) DEVICE — SINGLE-USE BIOPSY FORCEPS: Brand: RADIAL JAW 4

## (undated) DEVICE — NEEDLE SCLERO 25GA L240CM OD0.51MM ID0.24MM EXTN L4MM SHTH

## (undated) DEVICE — DRAPE,UNDRBUT,WHT GRAD PCH,CAPPORT,20/CS: Brand: MEDLINE

## (undated) DEVICE — DECANTER BAG 9": Brand: MEDLINE INDUSTRIES, INC.

## (undated) DEVICE — 3M™ IOBAN™ 2 ANTIMICROBIAL INCISE DRAPE 6650EZ: Brand: IOBAN™ 2

## (undated) DEVICE — SET IRRIG L81IN DIA0.195IN UROLOGY Y TYP 2 LD W/ DRIP CHMBR

## (undated) DEVICE — GOWN,SIRUS,POLYRNF,BRTHSLV,LG,30/CS: Brand: MEDLINE

## (undated) DEVICE — SUTURE PERMAHAND SZ 2-0 L12X18IN NONABSORBABLE BLK SILK A185H

## (undated) DEVICE — SUTURE PERMAHAND SZ 3-0 L18IN NONABSORBABLE BLK L26MM SH C013D

## (undated) DEVICE — SUTURE CHROMIC GUT SZ 0 L27IN ABSRB BRN L36MM CT-1 1/2 CIR 812H

## (undated) DEVICE — EVACUATOR URO BLDR W/ ADPT UROVAC

## (undated) DEVICE — 1LYRTR 16FR10ML100%SIL UMS SNP: Brand: MEDLINE INDUSTRIES, INC.

## (undated) DEVICE — KIT PERICARDCENT W/ 6FR PGTL CATH DIL ANES NDL LUERLOCK SYR

## (undated) DEVICE — GARMENT,MEDLINE,DVT,INT,CALF,MED, GEN2: Brand: MEDLINE

## (undated) DEVICE — GLOVE ORANGE PI 7 1/2   MSG9075

## (undated) DEVICE — WOUND RETRACTOR AND PROTECTOR: Brand: ALEXIS O WOUND PROTECTOR-RETRACTOR

## (undated) DEVICE — SUTURE PERMAHAND SZ 4-0 L18IN NONABSORBABLE BLK SILK BRAID A183H

## (undated) DEVICE — SUTURE PDS II SZ 0 L60IN ABSRB VLT L65MM TP-1 1/2 CIR Z991G

## (undated) DEVICE — STRAP,POSITIONING,KNEE/BODY,FOAM,4X60": Brand: MEDLINE

## (undated) DEVICE — SOLUTION IV 1000ML LAC RINGERS PH 6.5 INJ USP VIAFLX PLAS

## (undated) DEVICE — CATHETER URETH 20FR BLLN 30CC 3 W F SPEC INF CTRL BARDX